# Patient Record
Sex: FEMALE | Race: WHITE | Employment: OTHER | URBAN - METROPOLITAN AREA
[De-identification: names, ages, dates, MRNs, and addresses within clinical notes are randomized per-mention and may not be internally consistent; named-entity substitution may affect disease eponyms.]

---

## 2017-01-24 ENCOUNTER — HOSPITAL ENCOUNTER (OUTPATIENT)
Dept: MAMMOGRAPHY | Facility: CLINIC | Age: 62
Discharge: HOME | End: 2017-01-24
Attending: FAMILY MEDICINE | Admitting: FAMILY MEDICINE
Payer: COMMERCIAL

## 2017-01-24 ENCOUNTER — HOSPITAL ENCOUNTER (OUTPATIENT)
Dept: ULTRASOUND IMAGING | Facility: CLINIC | Age: 62
End: 2017-01-24
Attending: FAMILY MEDICINE
Payer: COMMERCIAL

## 2017-01-24 DIAGNOSIS — N63.10 BREAST MASS, RIGHT: ICD-10-CM

## 2017-01-24 PROCEDURE — 76642 ULTRASOUND BREAST LIMITED: CPT | Mod: RT

## 2017-01-24 PROCEDURE — G0204 DX MAMMO INCL CAD BI: HCPCS

## 2017-01-25 ENCOUNTER — HOSPITAL ENCOUNTER (OUTPATIENT)
Dept: ULTRASOUND IMAGING | Facility: CLINIC | Age: 62
End: 2017-01-25
Attending: FAMILY MEDICINE
Payer: COMMERCIAL

## 2017-01-25 ENCOUNTER — HOSPITAL ENCOUNTER (OUTPATIENT)
Dept: ULTRASOUND IMAGING | Facility: CLINIC | Age: 62
Discharge: HOME | End: 2017-01-25
Attending: FAMILY MEDICINE | Admitting: FAMILY MEDICINE
Payer: COMMERCIAL

## 2017-01-25 DIAGNOSIS — N63.10 BREAST MASS, RIGHT: ICD-10-CM

## 2017-01-25 PROCEDURE — 88360 TUMOR IMMUNOHISTOCHEM/MANUAL: CPT | Mod: 26 | Performed by: FAMILY MEDICINE

## 2017-01-25 PROCEDURE — 88360 TUMOR IMMUNOHISTOCHEM/MANUAL: CPT | Performed by: FAMILY MEDICINE

## 2017-01-25 PROCEDURE — 88377 M/PHMTRC ALYS ISHQUANT/SEMIQ: CPT | Performed by: FAMILY MEDICINE

## 2017-01-25 PROCEDURE — 88305 TISSUE EXAM BY PATHOLOGIST: CPT | Performed by: FAMILY MEDICINE

## 2017-01-25 PROCEDURE — 00000159 ZZHCL STATISTIC H-SEND OUTS PREP: Performed by: FAMILY MEDICINE

## 2017-01-25 PROCEDURE — 27210206 US BREAST BIOPSY CORE NEEDLE RIGHT

## 2017-01-25 PROCEDURE — 88305 TISSUE EXAM BY PATHOLOGIST: CPT | Mod: 26 | Performed by: FAMILY MEDICINE

## 2017-01-25 PROCEDURE — 88360 TUMOR IMMUNOHISTOCHEM/MANUAL: CPT | Mod: 26,59 | Performed by: FAMILY MEDICINE

## 2017-01-25 PROCEDURE — 00000158 ZZHCL STATISTIC H-FISH PROCESS B/S: Performed by: FAMILY MEDICINE

## 2017-01-25 PROCEDURE — 38505 NEEDLE BIOPSY LYMPH NODES: CPT

## 2017-01-25 NOTE — DISCHARGE INSTRUCTIONS
Page 1 of 1  For informational purposes only. Not to replace the advice of your health care provider. Copyright   2010 [facility]. All rights reserved. [employer] [id number] - REV 02/16.  After Your Breast Biopsy   Bleeding or bruising  Slight bruising is normal. If you bleed through the bandage, put direct pressure on the breast for 10 minutes.   If the breast begins to swell, or you have a lot of bleeding after 10 minutes of pressure, call the doctor who ordered your exam. Or, go to the emergency room.   Bandages  Keep your bandage in place until tomorrow morning. Do not get it wet.   If you have small pieces of tape on the skin, leave them in place. They will fall off on their own, or you can remove them after 5 days.   Activity  You may shower the morning after the exam. No heavy activity (lifting, vacuuming) on the day of your exam. You may go back to normal activity the next day, unless you had a lot of bleeding or pain.  Discomfort  You may take Tylenol (acetaminophen) today for pain. Tomorrow, you may take an anti-inflammatory medicine (aspirin, ibuprofen, Motrin, Aleve, Advil), unless your doctor tells you not to.  Wear your bra overnight to support the breast. You may also use an ice pack: Place it over the area for 15-20 minutes several times a day.  Infection  Infection is rare. Symptoms include fever, redness, increasing pain and fluid draining from the biopsy site. If you have any of these symptoms, please call the doctor who ordered your exam.  Results  Results may take up to 5 business days. A nurse or doctor from the Breast Center will call with your results. We will also send the results to the doctor who ordered your biopsy.  If you have not heard your results in 5 days, please call the Breast Center.   Other instructions  ______________________________________________________________________________________________________________________________  Call your doctor if:    You have  bleeding that lasts more than 10 minutes.    You have pain that cannot be controlled.   You have signs of infection (fever, redness, drainage or other signs).   You have not received your results within 5 days.    Please call the Breast Center nurse navigator at [phone] if you have questions or concerns about your biopsy.

## 2017-01-25 NOTE — PROGRESS NOTES
Aromatherapy offered per department guidelines. Lavendar scent used to promote relaxation and stress reduction.  Delivery method:  Cotton ball. Patient reports relief of anxiety.

## 2017-01-27 ENCOUNTER — TELEPHONE (OUTPATIENT)
Dept: MAMMOGRAPHY | Facility: CLINIC | Age: 62
End: 2017-01-27

## 2017-01-27 LAB — COPATH REPORT: NORMAL

## 2017-01-27 NOTE — TELEPHONE ENCOUNTER
[facility] path  Pathology report reviewed with breast radiologist Diego  I phoned and informed patient of results showing IDC and node positive for breast cancer.    Patient states no problems with biopsy site.  Recommended follow up is surgical consult.  Surgical Consult has been arranged with Dr Castellano on 1-30-17 at 1030 arriving at 1000.   Patient has directions and phone numbers. Dr Lindo's office also notified of results.    Questions were answered and I explained my role as Nurse Navigator in assisting her with appointments, resources and social support. New diagnosis information packet will be available at surgical consult. I will follow up with the patient. She has my phone number if she has further questions. Ordering provider notified.

## 2017-01-30 ENCOUNTER — OFFICE VISIT (OUTPATIENT)
Dept: SURGERY | Facility: CLINIC | Age: 62
End: 2017-01-30
Payer: COMMERCIAL

## 2017-01-30 ENCOUNTER — TRANSFERRED RECORDS (OUTPATIENT)
Dept: HEALTH INFORMATION MANAGEMENT | Facility: CLINIC | Age: 62
End: 2017-01-30

## 2017-01-30 ENCOUNTER — TRANSFERRED RECORDS (OUTPATIENT)
Dept: SURGERY | Facility: CLINIC | Age: 62
End: 2017-01-30

## 2017-01-30 VITALS
HEART RATE: 100 BPM | BODY MASS INDEX: 22.07 KG/M2 | SYSTOLIC BLOOD PRESSURE: 144 MMHG | OXYGEN SATURATION: 98 % | HEIGHT: 69 IN | WEIGHT: 149 LBS | DIASTOLIC BLOOD PRESSURE: 98 MMHG

## 2017-01-30 DIAGNOSIS — C50.411 MALIGNANT NEOPLASM OF UPPER-OUTER QUADRANT OF RIGHT FEMALE BREAST (H): Primary | ICD-10-CM

## 2017-01-30 PROCEDURE — 99204 OFFICE O/P NEW MOD 45 MIN: CPT | Performed by: SURGERY

## 2017-01-30 RX ORDER — CALCIUM CARBONATE 500(1250)
500 TABLET ORAL 2 TIMES DAILY
COMMUNITY
End: 2017-02-20

## 2017-01-30 NOTE — PROGRESS NOTES
Breast Patients    BREAST PATIENTS (ALL)    1-Do you have any of the following symptoms? Lump(s) or Mass(es)  2-In which breast are you having the symptoms? right  3-Do you use hormones?  No  4-Have you had a Mammogram? Yes  Where: [facility]  Date: 1/24/17  5-Have you ever had a breast cyst drained? No  6-Have you ever had a breast biopsy? Yes  Side: R  Date: 1/24/17  7-Have you ever had a Breast Cancer? Yes  Side: R  Date: 3/2006   8-Is there a history of Breast Cancer in your family? Yes   Relationship to you:    Mother  Sister  9-Have you ever had Ovarian Cancer? No  10-Is there a history of Ovarian Cancer in your family? Yes   Relationship to you:    Mother  11-Summarize your caffeine intake (i.e. coffee, tea, chocolate, soda etc.): 1-2 cups daily    BREAST PATIENTS (FEMALE)    12-What age did your periods begin? 15  13-Date your last menstrual period began? 2006  14-Number of full-term pregnancies: 3  15-Your age when your first child was born? 26  16-Did you nurse your children? No  17-Are you pregnant now? No  18-Have you begun menopause? Yes  Age Menopause began:  51  19-Have you had either ovary removed?No  20-Do you have breast implants? No       HPI:  right breast mass noted 1 week ago; 12 o'clock.    denies increase in size.   Skin rashes, dimpling or nipple changes:  none  Nipple discharge:   none  Does perform self breast exams.  Previous other breast surgery: Yes - right breast lumpectomy and SN, 2006 (at 12:30- 9cm FN), SN negative and all margins negative    No family history on file.  Family history of breast cancer: Yes - mother and sister, ages 70 and 50    Mammography reveals: new indeterminate cluster of microcalcifications measuring at the 12:30 o'clock position, corresponding to the palpable abnormality    US reveals: heterogeneous hypoechoic mass measuring 2 cm at the 11:30 o'clock position and 2 cm from the nipple. Lobulated  node in right axilla with some calcifications    Percutaneous core needle biopsy reveals: invasive ductal carcinoma grade 3, ER low pos, DC negative, HER-2 - pending, Biopsy of axillary node also positive for metastatic carcinoma (HER2 ordered but pending)  Previously ER and DC POS, HER2 neg in 2006    Past Medical History:   has no past medical history on file.    Past Surgical History:  No past surgical history on file.     Social History:  Social History     Social History     Marital Status:      Spouse Name: N/A     Number of Children: N/A     Years of Education: N/A     Occupational History     Not on file.     Social History Main Topics     Smoking status: Not on file     Smokeless tobacco: Not on file     Alcohol Use: Not on file     Drug Use: Not on file     Sexual Activity: Not on file     Other Topics Concern     Not on file     Social History Narrative     No narrative on file       PE:  Vitals: There were no vitals taken for this visit.  General appearance: well-nourished, sitting comfortably, no apparent distress  Neck: Supple without thyromegaly, lymphadenopathy, masses  Lungs: Respirations unlabored  Abdomen: soft, nondistended  Extremities: Without edema  Neurologic: nonfocal, grossly intact times four extremities, alert and oriented times three  Psychiatric: Mood and affect are appropriate  Skin: Without lesions or rashes  Breast:     Masses- right - 3 cm diameter   Ecchymosis- right   Incisional scar- right 12:00, 9 cm from nipple    Axillae:   Palpable adenopathy: right-Induration, no clearly palpable node    Assessment: Right breast cancer, recurrent, metastatic to R axillary node.     PLAN:Await HER-2 results.  This will to some extent to dictate chemotherapy options. Since she is already known to have a positive right axillary node, chemotherapy may be her initial treatment. She has an appointment with her oncologist this afternoon.  I discussed the option of mastectomy with her and her   including incision, scar, reconstructive options (gave her contact information for plastic surgery) and expected recovery.  She inquires about contralateral mastectomy as well as she has had a previous breast cancer herself and both her mother and sister have had breast cancers.  We discussed additional genetic testing through her oncologist office that may help in assessing her risk.  We discussed axillary nodes.  With a positive axillary node at this point, surgery was done immediately, we will perform maxillary node dissection.  I therefore reviewed complications of this including numbness, seroma, hematoma, persistent drainage, lymphedema.  If she would undergo chemotherapy first, we could potentially resect the clip marked node and also perform a sentinel node procedure.  If this is negative, we could potentially leave the remaining nodes behind.      Nino Castellano MD    Please route or send letter to:  Primary Care Provider (PCP)(Dr Soto), Referring Provider (Dr Lindo) and Include Progress Note        HPI      ROS (Review of Systems):     MUSCULOSKELETAL: Positive for back pain.          Physical Exam

## 2017-01-30 NOTE — MR AVS SNAPSHOT
"              After Visit Summary   2017    Lorna Guzman    MRN: [id number]           Patient Information     Date Of Birth          1955        Visit Information        Provider Department      2017 10:30 AM Nino Castellano MD [facility] General Surgery      Today's Diagnoses     Malignant neoplasm of upper-outer quadrant of right female breast (H)    -  1        Follow-ups after your visit        Who to contact     If you have questions or need follow up information about today's clinic visit or your schedule please contact [facility] directly at [phone].  Normal or non-critical lab and imaging results will be communicated to you by [employer], letter or phone within 4 business days after the clinic has received the results. If you do not hear from us within 7 days, please contact the clinic through [employer] or phone. If you have a critical or abnormal lab result, we will notify you by phone as soon as possible.  Submit refill requests through [employer] or call your pharmacy and they will forward the refill request to us. Please allow 3 business days for your refill to be completed.          Additional Information About Your Visit        MyChart Information     [employer] lets you send messages to your doctor, view your test results, renew your prescriptions, schedule appointments and more. To sign up, go to [email or web address] . Click on \"Log in\" on the left side of the screen, which will take you to the Welcome page. Then click on \"Sign up Now\" on the right side of the page.     You will be asked to enter the access code listed below, as well as some personal information. Please follow the directions to create your username and password.     Your access code is: [email or web address]  Expires: 2017 11:23 AM     Your access code will  in 90 days. If you need help or a new code, please call your [facility] or " "[phone].        Care EveryWhere ID     This is your Care EveryWhere ID. This could be used by other organizations to access your [facility] medical records  [id number]        Your Vitals Were     Pulse Height BMI (Body Mass Index) Pulse Oximetry          100 5' 9\" (1.753 m) 21.99 kg/m2 98%         Blood Pressure from Last 3 Encounters:   01/30/17 144/98    Weight from Last 3 Encounters:   01/30/17 149 lb (67.586 kg)              Today, you had the following     No orders found for display       Primary Care Provider Office Phone # Fax #    Max Moreno [phone]       [address] [address]        Thank you!     Thank you for choosing [facility]  for your care. Our goal is always to provide you with excellent care. Hearing back from our patients is one way we can continue to improve our services. Please take a few minutes to complete the written survey that you may receive in the mail after your visit with us. Thank you!             Your Updated Medication List - Protect others around you: Learn how to safely use, store and throw away your medicines at www.disposemymeds.org.          This list is accurate as of: 1/30/17 11:23 AM.  Always use your most recent med list.                   Brand Name Dispense Instructions for use    calcium carbonate 500 MG tablet    OS-CAROLYN 500 mg [place]. [place]     Take 500 mg by mouth 2 times daily       MULTIPLE VITAMINS PO            "

## 2017-01-30 NOTE — Clinical Note
2017      RE:  Lorna Guzman-:  [birth year]    HPI:  right breast mass noted 1 week ago; 12 o'clock.    denies increase in size.   Skin rashes, dimpling or nipple changes:  none  Nipple discharge:   none  Does perform self breast exams.  Previous other breast surgery: Yes - right breast lumpectomy and SN, 2006 (at 12:30- 9cm FN), SN negative and all margins negative    No family history on file.  Family history of breast cancer: Yes - mother and sister, ages 70 and 50    Mammography reveals: new indeterminate cluster of microcalcifications measuring at the 12:30 o'clock position, corresponding to the palpable abnormality    US reveals: heterogeneous hypoechoic mass measuring 2 cm at the 11:30 o'clock position and 2 cm from the nipple. Lobulated node in right axilla with some calcifications    Percutaneous core needle biopsy reveals: invasive ductal carcinoma grade 3, ER low pos, CO negative, HER-2 - pending, Biopsy of axillary node also positive for metastatic carcinoma (HER2 ordered but pending)  Previously ER and CO POS, HER2 neg in     Past Medical History:  Has no past medical history on file.    Past Surgical History:  No past surgical history on file.     PE:  Vitals: There were no vitals taken for this visit.  General appearance: well-nourished, sitting comfortably, no apparent distress  Neck: Supple without thyromegaly, lymphadenopathy, masses  Lungs: Respirations unlabored  Abdomen: soft, nondistended  Extremities: Without edema  Neurologic: nonfocal, grossly intact times four extremities, alert and oriented times three  Psychiatric: Mood and affect are appropriate  Skin: Without lesions or rashes  Breast:     Masses- right - 3 cm diameter   Ecchymosis- right   Incisional scar- right 12:00, 9 cm from nipple    Axillae:   Palpable adenopathy: right-Induration, no clearly palpable node    Assessment: Right breast cancer, recurrent, metastatic to R axillary node.     PLAN:Await HER-2  results.  This will to some extent to dictate chemotherapy options. Since she is already known to have a positive right axillary node, chemotherapy may be her initial treatment. She has an appointment with her oncologist this afternoon.  I discussed the option of mastectomy with her and her  including incision, scar, reconstructive options (gave her contact information for plastic surgery) and expected recovery.  She inquires about contralateral mastectomy as well as she has had a previous breast cancer herself and both her mother and sister have had breast cancers.  We discussed additional genetic testing through her oncologist office that may help in assessing her risk.  We discussed axillary nodes.  With a positive axillary node at this point, surgery was done immediately, we will perform maxillary node dissection.  I therefore reviewed complications of this including numbness, seroma, hematoma, persistent drainage, lymphedema.  If she would undergo chemotherapy first, we could potentially resect the clip marked node and also perform a sentinel node procedure.  If this is negative, we could potentially leave the remaining nodes behind.      Nino Castellano MD

## 2017-01-31 DIAGNOSIS — C50.919 BREAST CANCER (H): Primary | ICD-10-CM

## 2017-01-31 DIAGNOSIS — Z01.818 PREOP EXAMINATION: ICD-10-CM

## 2017-02-01 ENCOUNTER — HOSPITAL ENCOUNTER (OUTPATIENT)
Dept: CARDIOLOGY | Facility: CLINIC | Age: 62
Discharge: HOME | End: 2017-02-01
Payer: COMMERCIAL

## 2017-02-01 DIAGNOSIS — C50.911 MALIGNANT NEOPLASM OF RIGHT FEMALE BREAST, UNSPECIFIED SITE OF BREAST: ICD-10-CM

## 2017-02-01 DIAGNOSIS — Z01.818 PREOP EXAMINATION: ICD-10-CM

## 2017-02-01 PROCEDURE — 93306 TTE W/DOPPLER COMPLETE: CPT | Mod: 26 | Performed by: INTERNAL MEDICINE

## 2017-02-01 PROCEDURE — 0399T ZZHC MYOCARDIAL STRAIN IMAGING: CPT | Performed by: INTERNAL MEDICINE

## 2017-02-01 PROCEDURE — 93306 TTE W/DOPPLER COMPLETE: CPT

## 2017-02-02 ENCOUNTER — TELEPHONE (OUTPATIENT)
Dept: MAMMOGRAPHY | Facility: CLINIC | Age: 62
End: 2017-02-02

## 2017-02-03 LAB — COPATH REPORT: NORMAL

## 2017-02-06 ENCOUNTER — OFFICE VISIT (OUTPATIENT)
Dept: CARDIOLOGY | Facility: CLINIC | Age: 62
End: 2017-02-06
Payer: COMMERCIAL

## 2017-02-06 ENCOUNTER — DOCUMENTATION ONLY (OUTPATIENT)
Dept: CARDIOLOGY | Facility: CLINIC | Age: 62
End: 2017-02-06

## 2017-02-06 VITALS
HEIGHT: 70 IN | WEIGHT: 148 LBS | HEART RATE: 84 BPM | SYSTOLIC BLOOD PRESSURE: 124 MMHG | DIASTOLIC BLOOD PRESSURE: 68 MMHG | BODY MASS INDEX: 21.19 KG/M2

## 2017-02-06 DIAGNOSIS — I42.9 CARDIOMYOPATHY, IDIOPATHIC (H): ICD-10-CM

## 2017-02-06 DIAGNOSIS — C50.911 MALIGNANT NEOPLASM OF RIGHT FEMALE BREAST, UNSPECIFIED SITE OF BREAST: Primary | ICD-10-CM

## 2017-02-06 LAB
NT-PROBNP SERPL-MCNC: 136 PG/ML (ref 0–125)
TROPONIN I SERPL-MCNC: NORMAL UG/L (ref 0–0.04)

## 2017-02-06 PROCEDURE — 84484 ASSAY OF TROPONIN QUANT: CPT | Performed by: INTERNAL MEDICINE

## 2017-02-06 PROCEDURE — 36415 COLL VENOUS BLD VENIPUNCTURE: CPT | Performed by: INTERNAL MEDICINE

## 2017-02-06 PROCEDURE — 83880 ASSAY OF NATRIURETIC PEPTIDE: CPT | Performed by: INTERNAL MEDICINE

## 2017-02-06 PROCEDURE — 99204 OFFICE O/P NEW MOD 45 MIN: CPT | Performed by: INTERNAL MEDICINE

## 2017-02-06 PROCEDURE — 93000 ELECTROCARDIOGRAM COMPLETE: CPT | Performed by: INTERNAL MEDICINE

## 2017-02-06 RX ORDER — CARVEDILOL 3.12 MG/1
3.12 TABLET ORAL 2 TIMES DAILY WITH MEALS
Qty: 90 TABLET | Refills: 3 | Status: SHIPPED | OUTPATIENT
Start: 2017-02-06 | End: 2017-02-20

## 2017-02-06 NOTE — LETTER
2/6/2017    Max Moreno MD  Critical access hospital   [address] [facility] [address]    RE: Lorna A Thomas       Dear Colleague,    REASON FOR CONSULTATION:  Breast carcinoma, evidence of left ventricular dysfunction on echocardiogram.        I had the pleasure of seeing Ms. Guzman in consultation at the [facility] today.  She is a very pleasant 61-year-old female with no significant past cardiac history who was recently diagnosed with breast carcinoma.  She has a history of right breast carcinoma that was initially diagnosed in 03/2006.  At that time she underwent lumpectomy and AC chemotherapy as well as adjuvant radiation therapy.  She was also placed on adjuvant tamoxifen at that time due to her ER/UT positivity.  After 2 years of tamoxifen, she was transitioned to Aromasin and completed treatment in 07/2013.  At that point, there was no evidence of disease recurrence.      Unfortunately, in late 01/2017, she noticed a right breast lump and has subsequently been diagnosed with invasive ductal carcinoma of the right breast.  The plan at this point is for her to undergo neoadjuvant chemotherapy prior to definitive mastectomy and axillary lymph node dissection.      As part of her workup for on her upcoming chemotherapy, she underwent an echocardiogram that demonstrated mildly reduced left ventricular systolic function with a biplane LVEF of 47%.  Cardiac consultation was advised.      She is completely asymptomatic from a cardiac standpoint, denying any chest discomfort, dyspnea on exertion, PND, orthopnea or lower extremity edema.  She denies any syncope or presyncope.  She is a very active individual who works out on a regular basis and denies any chest pain or shortness of breath while doing so.  She does not take any cardiac medications.      Her past medical history, family history, social history, allergies and medications are reviewed in my Epic  note.      PHYSICAL EXAMINATION:   GENERAL:  She was alert and oriented.   VITAL SIGNS:  Her blood pressure today was 124/60 with a pulse of 84.   NECK:  Revealed no jugular venous distension or carotid bruits.   CHEST:  Clear to auscultation.   CARDIOVASCULAR:  Revealed a regular rate and rhythm, no murmurs are appreciated.   ABDOMEN:  Soft, nontender.   EXTREMITIES:  Demonstrated no edema.      She underwent an EKG today that demonstrated normal sinus rhythm at a rate of 87 beats per minute with normal axis and intervals.      I personally reviewed her echocardiogram obtained on 02/01/2017.  I agree with the reader's interpretation that she has mild global left ventricular dysfunction.  Mild mitral regurgitation was also noted.  By way of comparison, stress echocardiogram from 06/2011 demonstrated normal resting left ventricular systolic function.  The stress test was negative for ischemia.     Outpatient Encounter Prescriptions as of 2/6/2017   Medication Sig Dispense Refill     carvedilol (COREG) 3.125 MG tablet Take 1 tablet (3.125 mg) by mouth 2 times daily (with meals) 90 tablet 3     MULTIPLE VITAMINS PO        calcium carbonate (OS-CAROLYN 500 MG [place]. [place]) 500 MG tablet Take 500 mg by mouth 2 times daily       No facility-administered encounter medications on file as of 2/6/2017.       IMPRESSION:   1.  Recurrent right breast carcinoma.   2.  Mild left ventricular dysfunction on echocardiography which may be secondary to previous chemotherapy.  The patient is completely asymptomatic from a cardiac standpoint.      Ms. Guzman presents for an evaluation regarding a recently diagnosed recurrent right breast carcinoma as well as mild left ventricular dysfunction on echocardiography.  The plan at this point is for her to undergo neoadjuvant chemotherapy with a taxane and cyclophosphamide as well as possibly Herceptin if she is HER-2 positive.      From an etiology standpoint, the most likely culprit in this case  is her prior history of Adriamycin therapy.  Having said that, I do think she needs to be evaluated any further less likely causes of left ventricular dysfunction such as significant obstructive coronary artery disease or conditions such as a prior myocarditis.  To this effect, I have ordered a stress cardiac MRI for further evaluation.  This will also help us establish a more precise baseline LVEF estimate.      From a therapeutic standpoint, I have recommended that we initiate carvedilol 3.125 mg b.i.d.  I have explained the rationale for using this medication as well as the potential side effects.  Her blood pressure and heart rate appear to be adequate for initiation of beta blocker therapy and hopefully will able to titrate this up further.      From a chemotherapy standpoint, at this point in time given the mild left ventricular dysfunction and absence of any associated symptoms, I think it is permissible for her to undergo chemotherapy.  I do think that her left ventricular systolic function will need to be reassessed in approximately 1 month to rule out any further deterioration.      It was a pleasure seeing her in consultation today.  I did ask for baseline troponin and BNP values to be drawn today.  I will have her follow up in about 2 weeks of my physician assistants to go over the results of the stress MRI as well as to assess her response to the carvedilol.  If at that point she is tolerating it well, the initiation of lisinopril could be considered.  I have also asked her to contact us if she experiences any dyspnea on exertion, chest discomfort, palpitations, syncope or presyncope in which case our management strategy may change.     Sincerely,    Elias Spencer MD     [facility]

## 2017-02-06 NOTE — PROGRESS NOTES
REASON FOR CONSULTATION:  Breast carcinoma, evidence of left ventricular dysfunction on echocardiogram.        HISTORY OF PRESENT ILLNESS:  I had the pleasure of seeing Ms. Guzman in consultation at the [facility] today.  She is a very pleasant 61-year-old female with no significant past cardiac history who was recently diagnosed with breast carcinoma.  She has a history of right breast carcinoma that was initially diagnosed in 03/2006.  At that time she underwent lumpectomy and AC chemotherapy as well as adjuvant radiation therapy.  She was also placed on adjuvant tamoxifen at that time due to her ER/LA positivity.  After 2 years of tamoxifen, she was transitioned to Aromasin and completed treatment in 07/2013.  At that point, there was no evidence of disease recurrence.      Unfortunately, in late 01/2017, she noticed a right breast lump and has subsequently been diagnosed with invasive ductal carcinoma of the right breast.  The plan at this point is for her to undergo neoadjuvant chemotherapy prior to definitive mastectomy and axillary lymph node dissection.      As part of her workup for on her upcoming chemotherapy, she underwent an echocardiogram that demonstrated mildly reduced left ventricular systolic function with a biplane LVEF of 47%.  Cardiac consultation was advised.      She is completely asymptomatic from a cardiac standpoint, denying any chest discomfort, dyspnea on exertion, PND, orthopnea or lower extremity edema.  She denies any syncope or presyncope.  She is a very active individual who works out on a regular basis and denies any chest pain or shortness of breath while doing so.  She does not take any cardiac medications.      Her past medical history, family history, social history, allergies and medications are reviewed in my Epic note.      PHYSICAL EXAMINATION:   GENERAL:  She was alert and oriented.   VITAL SIGNS:  Her blood pressure today was 124/60 with a  pulse of 84.   NECK:  Revealed no jugular venous distension or carotid bruits.   CHEST:  Clear to auscultation.   CARDIOVASCULAR:  Revealed a regular rate and rhythm, no murmurs are appreciated.   ABDOMEN:  Soft, nontender.   EXTREMITIES:  Demonstrated no edema.      She underwent an EKG today that demonstrated normal sinus rhythm at a rate of 87 beats per minute with normal axis and intervals.      I personally reviewed her echocardiogram obtained on 02/01/2017.  I agree with the reader's interpretation that she has mild global left ventricular dysfunction.  Mild mitral regurgitation was also noted.  By way of comparison, stress echocardiogram from 06/2011 demonstrated normal resting left ventricular systolic function.  The stress test was negative for ischemia.      IMPRESSION:   1.  Recurrent right breast carcinoma.   2.  Mild left ventricular dysfunction on echocardiography which may be secondary to previous chemotherapy.  The patient is completely asymptomatic from a cardiac standpoint.      Ms. Guzman presents for an evaluation regarding a recently diagnosed recurrent right breast carcinoma as well as mild left ventricular dysfunction on echocardiography.  The plan at this point is for her to undergo neoadjuvant chemotherapy with a taxane and cyclophosphamide as well as possibly Herceptin if she is HER-2 positive.      From an etiology standpoint, the most likely culprit in this case is her prior history of Adriamycin therapy.  Having said that, I do think she needs to be evaluated any further less likely causes of left ventricular dysfunction such as significant obstructive coronary artery disease or conditions such as a prior myocarditis.  To this effect, I have ordered a stress cardiac MRI for further evaluation.  This will also help us establish a more precise baseline LVEF estimate.      From a therapeutic standpoint, I have recommended that we initiate carvedilol 3.125 mg b.i.d.  I have explained the  rationale for using this medication as well as the potential side effects.  Her blood pressure and heart rate appear to be adequate for initiation of beta blocker therapy and hopefully will able to titrate this up further.      From a chemotherapy standpoint, at this point in time given the mild left ventricular dysfunction and absence of any associated symptoms, I think it is permissible for her to undergo chemotherapy.  I do think that her left ventricular systolic function will need to be reassessed in approximately 1 month to rule out any further deterioration.      It was a pleasure seeing her in consultation today.  I did ask for baseline troponin and BNP values to be drawn today.  I will have her follow up in about 2 weeks of my physician assistants to go over the results of the stress MRI as well as to assess her response to the carvedilol.  If at that point she is tolerating it well, the initiation of lisinopril could be considered.  I have also asked her to contact us if she experiences any dyspnea on exertion, chest discomfort, palpitations, syncope or presyncope in which case our management strategy may change.         MANNY BAIN MD             D: 2017 09:43   T: 2017 14:13   MT: [id number]      Name:     KIMMIE PRICE   MRN:      [id number]        Account:      [id number]   :      1955           Service Date: 2017      Document: [id number]

## 2017-02-06 NOTE — MR AVS SNAPSHOT
After Visit Summary   2/6/2017    Lorna Guzman    MRN: [id number]           Patient Information     Date Of Birth          1955        Visit Information        Provider Department      2/6/2017 8:30 AM Elias Spencer MD [facility] [place]        Today's Diagnoses     Breast cancer (H)    -  1     Cardiomyopathy, idiopathic (H)            Follow-ups after your visit        Additional Services     Follow-Up with Cardiac Advanced Practice Provider                 Your next 10 appointments already scheduled     Feb 07, 2017  1:00 PM   CT LIVER BIOPSY with SHCT1, SH BODY RAD   [facility] ([facility])    [address] [address] [phone]           Plan for an adult to drive you home and stay with you until morning.  Tell your doctor in advance:   If you have any allergies.   If you are breastfeeding or there s any chance you are pregnant.  Please bring any scans or X-rays taken at other hospitals, if they may be helpful. Also bring a list of your medicines, including vitamins, minerals and over-the-counter drugs. It is safest to leave valuables at home.  If you take blood thinners, you may need to stop taking them a few days before treatment. Talk to your doctor before stopping these medicines. You will need a blood test the morning of your exam.   Stop taking Coumadin (warfarin) 5 days before treatment. Restart the day after treatment.   If you take aspirin, you may need to stop taking it 3 days before treatment.   If you take Plavix, Ticlid, Pletal or Persantine, you may need to stop taking them 5 days before your scan.  If you have diabetes:   If you take insulin, call your diabetes care team. Ask if you should adjust your insulin before this test.   If your kidney function is normal, continue taking your metformin (Avandamet, Glucophage, Glucovance, Metaglip) on the day of your exam.   If your kidney  function is abnormal, wait 48 hours before restarting this medicine.  If you have any questions, please call the imaging department where you will have your exam.  The day before your exam: Drink extra fluids at least six 8-ounce glasses (unless your doctor tells you to restrict fluids). The day of your exam: No eating or drinking for 4 hours before your test. You may take medicine with small sips of water.            Feb 10, 2017 10:00 AM   MR CARDIAC W CONTRAST AND STRESS with SCIMR1   [facility] ([facility])    [address]  [address]  [facility] [address] [phone]           Take your medicines as usual, unless your doctor tells you not to.   If you take Viagra, Levitra or Cialis, stop taking it 48 hours before your test.   If you take Aggrenox or dipyridamole (Persantine, Permole), stop taking it 48 hours before your test.   If you take Theophylline or Aminophylline, stop taking it 12 hours before your test.   If you are diabetic and take oral hypoglycemics, do not take them on the day of your test.  The day before your exam, drink extra fluids at least six 8-ounce glasses (unless your doctor wants you to limit your fluids).  Stop all caffeine 12 hours before the test. This includes coffee, tea, soda, chocolate and certain medicines (such as Anacin, Excedrin and NoDoz). Also avoid decaf coffee and tea, as these contain small amounts of caffeine.  Do not eat or drink for 3 hours before your exam. You may drink water and take your morning medicines.  You may need a blood test (creatinine test) within 30 days of your exam. Follow your doctor s orders if this is arranged before your exam.  If you are very claustrophobic, please let you doctor know. You may get a sedative medicine from your doctor before you arrive. Bring the medicine to the exam. Do not take it at home. Arrive one hour early. Bring someone who can take you home after the  test. Your medicine will make you sleepy. After the exam, you may not drive, take a bus or take a taxi by yourself.  The MRI machine uses a strong magnet. Please wear clothes without metal (snaps, zippers). A sweatsuit works well, or we may give you a hospital gown.  Please remove any body piercings and hair extensions before you arrive. You will remove watches, jewelry, hairpins, wallets, dentures, partial dental plates and hearing aids. You may wear contact lenses, and you may be able to wear your rings. We have a safe place to keep your personal items, but it is safer to leave them at home.   **IMPORTANT** THE INSTRUCTIONS BELOW ARE ONLY FOR THOSE PATIENTS WHO HAVE BEEN TOLD THEY WILL RECEIVE SEDATION OR GENERAL ANESTHESIA DURING THEIR MRI PROCEDURE:  IF YOU WILL RECEIVE SEDATION (take medicine to help you relax during your exam):   You must get the medicine from your doctor before you arrive. Bring the medicine to the exam. Do not take it at home.   Arrive one hour early. Bring someone who can take you home after the test. Your medicine will make you sleepy. After the exam, you may not drive, take a bus or take a taxi by yourself.   No eating 8 hours before your exam. You may have clear liquids up until 4 hours before your exam. (Clear liquids include water, clear tea, black coffee and fruit juice without pulp.)  IF YOU WILL RECEIVE ANESTHESIA (be asleep for your exam):   Arrive 1 1/2 hours early. Bring someone who can take you home after the test. You may not drive, take a bus or take a taxi by yourself.   No eating 8 hours before your exam. You may have clear liquids up until 4 hours before your exam. (Clear liquids include water, clear tea, black coffee and fruit juice without pulp.)  If you have any questions, please contact your Imaging Department exam site.            Feb 20, 2017  1:50 PM   Return Visit with [facility] ([facility]  "Clinics)    [address] [place] [address] [address]  [facility] [address] [phone]              Future tests that were ordered for you today     Open Future Orders        Priority Expected Expires Ordered    Follow-Up with Cardiac Advanced Practice Provider Routine 2/20/2017 2/6/2018 2/6/2017    MRI Cardiac w/contrast and stress Routine 2/7/2017 2/6/2018 2/6/2017            Who to contact     If you have questions or need follow up information about today's clinic visit or your schedule please contact [facility] [place] directly at [phone].  Normal or non-critical lab and imaging results will be communicated to you by MyChart, letter or phone within 4 business days after the clinic has received the results. If you do not hear from us within 7 days, please contact the clinic through MyChart or phone. If you have a critical or abnormal lab result, we will notify you by phone as soon as possible.  Submit refill requests through [employer] or call your pharmacy and they will forward the refill request to us. Please allow 3 business days for your refill to be completed.          Additional Information About Your Visit        Care EveryWhere ID     This is your Care EveryWhere ID. This could be used by other organizations to access your [place] medical records  [id number]        Your Vitals Were     Pulse Height BMI (Body Mass Index)             84 1.765 m (5' 9.5\") 21.55 kg/m2          Blood Pressure from Last 3 Encounters:   02/06/17 124/68   01/30/17 144/98    Weight from Last 3 Encounters:   02/06/17 67.132 kg (148 lb)   01/30/17 67.586 kg (149 lb)              We Performed the Following     EKG 12-lead complete w/read - Clinics (performed today)          Today's Medication Changes          These changes are accurate as of: 2/6/17 10:00 AM.  If you have any questions, ask your nurse or doctor.               Start taking these medicines.        Dose/Directions    carvedilol 3.125 " MG tablet   Commonly known as:  COREG   Used for:  Cardiomyopathy, idiopathic (H)   Started by:  Elias Spencer MD        Dose:  3.125 mg   Take 1 tablet (3.125 mg) by mouth 2 times daily (with meals)   Quantity:  90 tablet   Refills:  3            Where to get your medicines      These medications were sent to [facility] - [place], [place] - [address], [facility] [address]     Phone:  [phone]    - carvedilol 3.125 MG tablet             Primary Care Provider Office Phone # Fax #    Max Moreno [phone]       [address] [address]        Thank you!     Thank you for choosing [facility] [place]  for your care. Our goal is always to provide you with excellent care. Hearing back from our patients is one way we can continue to improve our services. Please take a few minutes to complete the written survey that you may receive in the mail after your visit with us. Thank you!             Your Updated Medication List - Protect others around you: Learn how to safely use, store and throw away your medicines at www.disposemymeds.org.          This list is accurate as of: 2/6/17 10:00 AM.  Always use your most recent med list.                   Brand Name Dispense Instructions for use    calcium carbonate 500 MG tablet    OS-CAROLYN 500 mg [place]. [place]     Take 500 mg by mouth 2 times daily       carvedilol 3.125 MG tablet    COREG    90 tablet    Take 1 tablet (3.125 mg) by mouth 2 times daily (with meals)       MULTIPLE VITAMINS PO

## 2017-02-06 NOTE — PROGRESS NOTES
HPI and Plan:   See dictation    Orders Placed This Encounter   Procedures     MRI Cardiac w/contrast and stress     N terminal pro BNP outpatient     Troponin I     Follow-Up with Cardiac Advanced Practice Provider     EKG 12-lead complete w/read - Clinics (performed today)       Orders Placed This Encounter   Medications     carvedilol (COREG) 3.125 MG tablet     Sig: Take 1 tablet (3.125 mg) by mouth 2 times daily (with meals)     Dispense:  90 tablet     Refill:  3       There are no discontinued medications.      Encounter Diagnoses   Name Primary?     Breast cancer (H) Yes     Cardiomyopathy, idiopathic (H)        CURRENT MEDICATIONS:  Current Outpatient Prescriptions   Medication Sig Dispense Refill     carvedilol (COREG) 3.125 MG tablet Take 1 tablet (3.125 mg) by mouth 2 times daily (with meals) 90 tablet 3     MULTIPLE VITAMINS PO        calcium carbonate (OS-CAROLYN 500 MG [place]. [place]) 500 MG tablet Take 500 mg by mouth 2 times daily         ALLERGIES   No Known Allergies    PAST MEDICAL HISTORY:  Past Medical History   Diagnosis Date     Cancer (H)      breast       PAST SURGICAL HISTORY:  Past Surgical History   Procedure Laterality Date     Breast surgery       March 2006       FAMILY HISTORY:  Family History   Problem Relation Age of Onset     DIABETES Mother      Breast Cancer Mother      Ovarian Cancer Mother      Hypertension Father      Breast Cancer Sister        SOCIAL HISTORY:  Social History     Social History     Marital Status:      Spouse Name: N/A     Number of Children: N/A     Years of Education: N/A     Social History Main Topics     Smoking status: Never Smoker      Smokeless tobacco: None     Alcohol Use: No     Drug Use: No     Sexual Activity:     Partners: Male     Other Topics Concern     None     Social History Narrative       Review of Systems:  Skin:  Negative       Eyes:  Negative      ENT:  Negative      Respiratory:  Negative       Cardiovascular:  Negative     "  Gastroenterology: Negative      Genitourinary:  Negative      Musculoskeletal:  Negative      Neurologic:  Negative      Psychiatric:  Positive for anxiety pos breat cancer  Heme/Lymph/Imm:  Negative      Endocrine:  Negative        Physical Exam:  Vitals: /68 mmHg  Pulse 84  Ht 1.765 m (5' 9.5\")  Wt 67.132 kg (148 lb)  BMI 21.55 kg/m2    Constitutional:  cooperative        Skin:  warm and dry to the touch        Head:  normocephalic        Eyes:  pupils equal and round        ENT:  no pallor or cyanosis        Neck:  carotid pulses are full and equal bilaterally        Chest:  clear to auscultation          Cardiac: regular rhythm                  Abdomen:  abdomen soft        Vascular: pulses full and equal                                        Extremities and Back:  no edema              Neurological:  no gross motor deficits              CC  No referring provider defined for this encounter.                "

## 2017-02-06 NOTE — PROGRESS NOTES
Post-OV BNP and troponin 2/6/17 noted. Patient has OV 2/20/17 with BALDOMERO Maira Khanna  Liver biopsy scheduled 2/7/17  MRI scheduled 2/10/17  Will message Dr. Spencer to review

## 2017-02-07 ENCOUNTER — HOSPITAL ENCOUNTER (OUTPATIENT)
Dept: CT IMAGING | Facility: CLINIC | Age: 62
End: 2017-02-07
Payer: COMMERCIAL

## 2017-02-07 ENCOUNTER — TELEPHONE (OUTPATIENT)
Dept: CARDIOLOGY | Facility: CLINIC | Age: 62
End: 2017-02-07

## 2017-02-07 ENCOUNTER — HOSPITAL ENCOUNTER (OUTPATIENT)
Facility: CLINIC | Age: 62
Discharge: HOME | End: 2017-02-07
Payer: COMMERCIAL

## 2017-02-07 VITALS
RESPIRATION RATE: 18 BRPM | SYSTOLIC BLOOD PRESSURE: 149 MMHG | OXYGEN SATURATION: 93 % | DIASTOLIC BLOOD PRESSURE: 79 MMHG | HEART RATE: 87 BPM

## 2017-02-07 VITALS
OXYGEN SATURATION: 97 % | HEART RATE: 77 BPM | DIASTOLIC BLOOD PRESSURE: 67 MMHG | TEMPERATURE: 97.1 F | SYSTOLIC BLOOD PRESSURE: 144 MMHG | RESPIRATION RATE: 18 BRPM

## 2017-02-07 DIAGNOSIS — K76.9 LIVER LESION: ICD-10-CM

## 2017-02-07 DIAGNOSIS — C50.919 MALIGNANT NEOPLASM OF FEMALE BREAST, UNSPECIFIED LATERALITY, UNSPECIFIED SITE OF BREAST: ICD-10-CM

## 2017-02-07 LAB
INR PPP: 0.9 (ref 0.86–1.14)
PLATELET # BLD AUTO: 214 10E9/L (ref 150–450)

## 2017-02-07 PROCEDURE — 88161 CYTOPATH SMEAR OTHER SOURCE: CPT | Mod: 26 | Performed by: RADIOLOGY

## 2017-02-07 PROCEDURE — 85610 PROTHROMBIN TIME: CPT

## 2017-02-07 PROCEDURE — 88360 TUMOR IMMUNOHISTOCHEM/MANUAL: CPT | Performed by: RADIOLOGY

## 2017-02-07 PROCEDURE — 88360 TUMOR IMMUNOHISTOCHEM/MANUAL: CPT | Mod: 26 | Performed by: RADIOLOGY

## 2017-02-07 PROCEDURE — 88342 IMHCHEM/IMCYTCHM 1ST ANTB: CPT | Mod: XU | Performed by: RADIOLOGY

## 2017-02-07 PROCEDURE — 40000863 ZZH STATISTIC RADIOLOGY XRAY, US, CT, MAR, NM

## 2017-02-07 PROCEDURE — 85049 AUTOMATED PLATELET COUNT: CPT

## 2017-02-07 PROCEDURE — 99153 MOD SED SAME PHYS/QHP EA: CPT

## 2017-02-07 PROCEDURE — 88161 CYTOPATH SMEAR OTHER SOURCE: CPT | Mod: XU | Performed by: RADIOLOGY

## 2017-02-07 PROCEDURE — 99152 MOD SED SAME PHYS/QHP 5/>YRS: CPT

## 2017-02-07 PROCEDURE — 88172 CYTP DX EVAL FNA 1ST EA SITE: CPT | Performed by: RADIOLOGY

## 2017-02-07 PROCEDURE — 88342 IMHCHEM/IMCYTCHM 1ST ANTB: CPT | Mod: 26 | Performed by: RADIOLOGY

## 2017-02-07 PROCEDURE — 77012 CT SCAN FOR NEEDLE BIOPSY: CPT

## 2017-02-07 PROCEDURE — 25000128 H RX IP 250 OP 636: Performed by: RADIOLOGY

## 2017-02-07 PROCEDURE — 88172 CYTP DX EVAL FNA 1ST EA SITE: CPT | Mod: 26 | Performed by: RADIOLOGY

## 2017-02-07 PROCEDURE — 88173 CYTOPATH EVAL FNA REPORT: CPT | Mod: 26 | Performed by: RADIOLOGY

## 2017-02-07 PROCEDURE — 25000125 ZZHC RX 250

## 2017-02-07 PROCEDURE — 88307 TISSUE EXAM BY PATHOLOGIST: CPT | Mod: 26 | Performed by: RADIOLOGY

## 2017-02-07 PROCEDURE — 25000125 ZZHC RX 250: Performed by: RADIOLOGY

## 2017-02-07 PROCEDURE — 25500064 ZZH RX 255 OP 636

## 2017-02-07 PROCEDURE — 88173 CYTOPATH EVAL FNA REPORT: CPT | Performed by: RADIOLOGY

## 2017-02-07 PROCEDURE — 88307 TISSUE EXAM BY PATHOLOGIST: CPT | Performed by: RADIOLOGY

## 2017-02-07 RX ORDER — LIDOCAINE HYDROCHLORIDE 10 MG/ML
10 INJECTION, SOLUTION EPIDURAL; INFILTRATION; INTRACAUDAL; PERINEURAL ONCE
Status: COMPLETED | OUTPATIENT
Start: 2017-02-07 | End: 2017-02-07

## 2017-02-07 RX ORDER — LIDOCAINE 40 MG/G
CREAM TOPICAL
Status: DISCONTINUED | OUTPATIENT
Start: 2017-02-07 | End: 2017-02-07 | Stop reason: HOSPADM

## 2017-02-07 RX ORDER — LIDOCAINE HYDROCHLORIDE 10 MG/ML
1-30 INJECTION, SOLUTION EPIDURAL; INFILTRATION; INTRACAUDAL; PERINEURAL
Status: DISCONTINUED | OUTPATIENT
Start: 2017-02-07 | End: 2017-02-08 | Stop reason: HOSPADM

## 2017-02-07 RX ORDER — FENTANYL CITRATE 50 UG/ML
25-50 INJECTION, SOLUTION INTRAMUSCULAR; INTRAVENOUS EVERY 5 MIN PRN
Status: DISCONTINUED | OUTPATIENT
Start: 2017-02-07 | End: 2017-02-07

## 2017-02-07 RX ORDER — IOPAMIDOL 755 MG/ML
72 INJECTION, SOLUTION INTRAVASCULAR ONCE
Status: COMPLETED | OUTPATIENT
Start: 2017-02-07 | End: 2017-02-07

## 2017-02-07 RX ORDER — NALOXONE HYDROCHLORIDE 0.4 MG/ML
.1-.4 INJECTION, SOLUTION INTRAMUSCULAR; INTRAVENOUS; SUBCUTANEOUS
Status: DISCONTINUED | OUTPATIENT
Start: 2017-02-07 | End: 2017-02-07

## 2017-02-07 RX ORDER — FLUMAZENIL 0.1 MG/ML
0.2 INJECTION, SOLUTION INTRAVENOUS
Status: DISCONTINUED | OUTPATIENT
Start: 2017-02-07 | End: 2017-02-07

## 2017-02-07 RX ADMIN — MIDAZOLAM HYDROCHLORIDE 1 MG: 1 INJECTION, SOLUTION INTRAMUSCULAR; INTRAVENOUS at 14:12

## 2017-02-07 RX ADMIN — FENTANYL CITRATE 50 MCG: 50 INJECTION, SOLUTION INTRAMUSCULAR; INTRAVENOUS at 14:29

## 2017-02-07 RX ADMIN — IOPAMIDOL 72 ML: 755 INJECTION, SOLUTION INTRAVENOUS at 14:32

## 2017-02-07 RX ADMIN — FENTANYL CITRATE 50 MCG: 50 INJECTION, SOLUTION INTRAMUSCULAR; INTRAVENOUS at 14:11

## 2017-02-07 RX ADMIN — SODIUM CHLORIDE 61 ML: 9 INJECTION, SOLUTION INTRAVENOUS at 14:33

## 2017-02-07 RX ADMIN — LIDOCAINE HYDROCHLORIDE 10 MG: 10 INJECTION, SOLUTION EPIDURAL; INFILTRATION; INTRACAUDAL; PERINEURAL at 14:34

## 2017-02-07 NOTE — PROGRESS NOTES
Sedation Post Procedure Summary:    Immediately prior to starting the procedure a Time Out was conducted with procedural staff and re-confirmed the patient s name, procedure, and site/side. md completed sedation assessment.   Consent obtained from patient after discussing the risks, benefits and alternatives.       Indication:  Sedation was required to allow for Ct guided liver biopsy    Sedatives: Fentanyl  100 mcg and Versed 1 mg    Vital signs, airway, and pulse oximetry were monitored throughout the procedure and sedation was maintained until the procedure was complete.      Patient tolerance: Patient tolerated the procedure well with no immediate complications. Mid abdomen biopsy site dry and intact with no bleeding at completion.    Post-procedure report given to: Care suites RN and pt transferred to room 12 via cart.    (See Doc Flow-sheets and MAR for additional information)    Andry Guthrie

## 2017-02-07 NOTE — PROGRESS NOTES
RADIOLOGY PROCEDURE NOTE  Patient name: Lorna Guzman  MRN: [id number]  : 1955    Pre-procedure diagnosis: Liver lesion  Post-procedure diagnosis: Same    Procedure Date/Time: 2017  2:41 PM  Procedure: Biopsy.  Estimated blood loss: None  Specimen(s) collected with description: Core biopsy samples.  The patient tolerated the procedure well with no immediate complications.    See imaging dictation for procedural details and findings.    Provider name: Michael Grimaldo  Assistant(s):None

## 2017-02-07 NOTE — TELEPHONE ENCOUNTER
Call from Dr. Lindo's office at [facility], the patient is HER-2 positive so will be starting Herceptin within the week.  Will message Dr. Spencer with update

## 2017-02-08 LAB — COPATH REPORT: NORMAL

## 2017-02-09 ENCOUNTER — TRANSFERRED RECORDS (OUTPATIENT)
Dept: SURGERY | Facility: CLINIC | Age: 62
End: 2017-02-09

## 2017-02-09 ENCOUNTER — TRANSFERRED RECORDS (OUTPATIENT)
Dept: CARDIOLOGY | Facility: CLINIC | Age: 62
End: 2017-02-09

## 2017-02-10 ENCOUNTER — HOSPITAL ENCOUNTER (OUTPATIENT)
Dept: CARDIOLOGY | Facility: CLINIC | Age: 62
Discharge: HOME | End: 2017-02-10
Attending: INTERNAL MEDICINE | Admitting: INTERNAL MEDICINE
Payer: COMMERCIAL

## 2017-02-10 ENCOUNTER — TELEPHONE (OUTPATIENT)
Dept: CARDIOLOGY | Facility: CLINIC | Age: 62
End: 2017-02-10

## 2017-02-10 VITALS — OXYGEN SATURATION: 98 % | DIASTOLIC BLOOD PRESSURE: 64 MMHG | SYSTOLIC BLOOD PRESSURE: 136 MMHG

## 2017-02-10 DIAGNOSIS — I42.9 CARDIOMYOPATHY, IDIOPATHIC (H): ICD-10-CM

## 2017-02-10 LAB
CREAT BLD-MCNC: 0.7 MG/DL (ref 0.52–1.04)
GFR SERPL CREATININE-BSD FRML MDRD: 86 ML/MIN/1.7M2

## 2017-02-10 PROCEDURE — 25500064 ZZH RX 255 OP 636: Performed by: INTERNAL MEDICINE

## 2017-02-10 PROCEDURE — 93017 CV STRESS TEST TRACING ONLY: CPT

## 2017-02-10 PROCEDURE — 75563 CARD MRI W/STRESS IMG & DYE: CPT | Mod: 26 | Performed by: INTERNAL MEDICINE

## 2017-02-10 PROCEDURE — 82565 ASSAY OF CREATININE: CPT

## 2017-02-10 PROCEDURE — 25000128 H RX IP 250 OP 636: Performed by: INTERNAL MEDICINE

## 2017-02-10 PROCEDURE — 93018 CV STRESS TEST I&R ONLY: CPT | Performed by: INTERNAL MEDICINE

## 2017-02-10 PROCEDURE — 93016 CV STRESS TEST SUPVJ ONLY: CPT | Performed by: INTERNAL MEDICINE

## 2017-02-10 PROCEDURE — A9585 GADOBUTROL INJECTION: HCPCS | Performed by: INTERNAL MEDICINE

## 2017-02-10 RX ORDER — ONDANSETRON 2 MG/ML
4 INJECTION INTRAMUSCULAR; INTRAVENOUS
Status: DISCONTINUED | OUTPATIENT
Start: 2017-02-10 | End: 2017-02-11 | Stop reason: HOSPADM

## 2017-02-10 RX ORDER — GADOBUTROL 604.72 MG/ML
5-65 INJECTION INTRAVENOUS ONCE
Status: COMPLETED | OUTPATIENT
Start: 2017-02-10 | End: 2017-02-10

## 2017-02-10 RX ORDER — REGADENOSON 0.08 MG/ML
0.4 INJECTION, SOLUTION INTRAVENOUS ONCE
Status: COMPLETED | OUTPATIENT
Start: 2017-02-10 | End: 2017-02-10

## 2017-02-10 RX ORDER — AMINOPHYLLINE 25 MG/ML
50-100 INJECTION, SOLUTION INTRAVENOUS
Status: DISCONTINUED | OUTPATIENT
Start: 2017-02-10 | End: 2017-02-11 | Stop reason: HOSPADM

## 2017-02-10 RX ORDER — DIPHENHYDRAMINE HCL 25 MG
25 CAPSULE ORAL
Status: DISCONTINUED | OUTPATIENT
Start: 2017-02-10 | End: 2017-02-11 | Stop reason: HOSPADM

## 2017-02-10 RX ORDER — AMINOPHYLLINE 25 MG/ML
100 INJECTION, SOLUTION INTRAVENOUS ONCE
Status: COMPLETED | OUTPATIENT
Start: 2017-02-10 | End: 2017-02-10

## 2017-02-10 RX ORDER — ALBUTEROL SULFATE 90 UG/1
2 AEROSOL, METERED RESPIRATORY (INHALATION) EVERY 5 MIN PRN
Status: DISCONTINUED | OUTPATIENT
Start: 2017-02-10 | End: 2017-02-11 | Stop reason: HOSPADM

## 2017-02-10 RX ORDER — ACYCLOVIR 200 MG/1
0-1 CAPSULE ORAL
Status: DISCONTINUED | OUTPATIENT
Start: 2017-02-10 | End: 2017-02-11 | Stop reason: HOSPADM

## 2017-02-10 RX ORDER — REGADENOSON 0.08 MG/ML
0.4 INJECTION, SOLUTION INTRAVENOUS ONCE
Status: DISCONTINUED | OUTPATIENT
Start: 2017-02-10 | End: 2017-02-11 | Stop reason: HOSPADM

## 2017-02-10 RX ORDER — METHYLPREDNISOLONE SODIUM SUCCINATE 125 MG/2ML
125 INJECTION, POWDER, LYOPHILIZED, FOR SOLUTION INTRAMUSCULAR; INTRAVENOUS
Status: DISCONTINUED | OUTPATIENT
Start: 2017-02-10 | End: 2017-02-11 | Stop reason: HOSPADM

## 2017-02-10 RX ORDER — DIPHENHYDRAMINE HYDROCHLORIDE 50 MG/ML
25-50 INJECTION INTRAMUSCULAR; INTRAVENOUS
Status: DISCONTINUED | OUTPATIENT
Start: 2017-02-10 | End: 2017-02-11 | Stop reason: HOSPADM

## 2017-02-10 RX ADMIN — AMINOPHYLLINE 100 MG: 25 INJECTION, SOLUTION INTRAVENOUS at 11:29

## 2017-02-10 RX ADMIN — REGADENOSON 0.4 MG: 0.08 INJECTION, SOLUTION INTRAVENOUS at 11:24

## 2017-02-10 RX ADMIN — GADOBUTROL 18 ML: 604.72 INJECTION INTRAVENOUS at 12:00

## 2017-02-10 NOTE — TELEPHONE ENCOUNTER
OV with BALDOMERO 2-20-17.   Cardiac MRI 2-10-17  Mild-to-moderate, non-ischemic cardiomyopathy with LVEF of 43% and  RVEF of 63%. 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

## 2017-02-13 ENCOUNTER — TELEPHONE (OUTPATIENT)
Dept: CARDIOLOGY | Facility: CLINIC | Age: 62
End: 2017-02-13

## 2017-02-14 NOTE — TELEPHONE ENCOUNTER
I spoke with Dr Lindo today regarding this patient and her MRI results. Her malignancy is strongly HER 2 positive, and herceptin would be the recommended treatment. Therefore I think it is reasonable to initiate therapy with a follow up CMR without contrast in 1 month to reassess her LVEF. She is at higher risk of cardiac complications due to her baseline cardiomyopathy but at this time he and I feel that the benefits outweigh the risks especially given her excellent functional status. She will continue with  medical therapy for her cardiomyopathy in close follow up with us.

## 2017-02-15 ENCOUNTER — DOCUMENTATION ONLY (OUTPATIENT)
Dept: CARDIOLOGY | Facility: CLINIC | Age: 62
End: 2017-02-15

## 2017-02-16 ENCOUNTER — DOCUMENTATION ONLY (OUTPATIENT)
Dept: CARDIOLOGY | Facility: CLINIC | Age: 62
End: 2017-02-16

## 2017-02-16 DIAGNOSIS — I42.9 SECONDARY CARDIOMYOPATHY (H): Primary | ICD-10-CM

## 2017-02-16 RX ORDER — LISINOPRIL 5 MG/1
2.5 TABLET ORAL DAILY
Qty: 15 TABLET | Refills: 3 | Status: SHIPPED | OUTPATIENT
Start: 2017-02-16 | End: 2017-03-16

## 2017-02-16 NOTE — PROGRESS NOTES
"Message from Dr. Spencer \"Can we start this patient on 2.5 MG of lisinopril daily as well? \"    Attempted to contact patient, left message for patient to call back    Spoke with patient, she is willing to start the lisinopril, Rx escripted for 1 month. Reviewed MRI preliminary report, patient will discuss further at OV 2/20/17.  "

## 2017-02-20 ENCOUNTER — OFFICE VISIT (OUTPATIENT)
Dept: CARDIOLOGY | Facility: CLINIC | Age: 62
End: 2017-02-20
Attending: INTERNAL MEDICINE
Payer: COMMERCIAL

## 2017-02-20 VITALS
BODY MASS INDEX: 21 KG/M2 | SYSTOLIC BLOOD PRESSURE: 126 MMHG | HEART RATE: 92 BPM | HEIGHT: 70 IN | DIASTOLIC BLOOD PRESSURE: 62 MMHG | WEIGHT: 146.7 LBS

## 2017-02-20 DIAGNOSIS — I42.9 SECONDARY CARDIOMYOPATHY (H): Primary | ICD-10-CM

## 2017-02-20 PROCEDURE — 99214 OFFICE O/P EST MOD 30 MIN: CPT | Performed by: NURSE PRACTITIONER

## 2017-02-20 RX ORDER — CARVEDILOL 6.25 MG/1
6.25 TABLET ORAL 2 TIMES DAILY WITH MEALS
Start: 2017-02-20 | End: 2017-03-07

## 2017-02-20 RX ORDER — PROCHLORPERAZINE MALEATE 10 MG
10 TABLET ORAL EVERY 6 HOURS PRN
COMMUNITY
End: 2017-08-04

## 2017-02-20 RX ORDER — LORAZEPAM 0.5 MG/1
0.5 TABLET ORAL EVERY 6 HOURS PRN
COMMUNITY
End: 2019-10-07

## 2017-02-20 NOTE — PROGRESS NOTES
Cardiology Clinic Progress Note  Lorna Guzman MRN# [id number]   YOB: 1955 Age: 61 year old     Reason For Visit: Follow-up stress cardiac MRI   Primary Cardiologist:   Dr. Spencer          History of Presenting Illness:    Lorna Guzman is a pleasant 61 year old patient with no past cardiac history. Past medical history significant for recurrent right breast carcinoma.  She has a history of right breast carcinoma diagnosed in 2006 and underwent lumpectomy and AC chemotherapy as well as adjuvant radiation therapy.  She was also placed on adjuvant tamoxifen.  After two years of tamoxifen she transitioned to Aromasin and completed treatment in 2013 with no evidence of recurrent disease.  Unfortunately, in late 2017 she noticed a right breast lump and was diagnosed with invasive ductal carcinoma of the right breast.  Current plan is for neoadjuvant chemotherapy with taxane, cyclophosphamide, as well as Herceptin, prior to definitive mastectomy and axillary lymph node dissection.  As part of her chemotherapy workup she underwent echocardiogram which noted a reduced LV systolic function of 47% and she was referred to cardiology.     Pt was last seen by Dr. Spencer on 2/6/2016 for consultation.  He noted that the most likely culprit for her cardiomyopathy was her prior history of Adriamycin therapy.  He recommended stress cardiac MRI  and initiated carvedilol 3.125 mg b.i.d.  He also had a baseline troponin and N-terminal proBNP drawn. Note from 2/13/2017 shows that Dr. Spencer discussed cardiac MRI results with Dr. Lindo noting that with her malignancy being HER-2 positive it was reasonable to start Herceptin and recommended follow-up cardiac MRI without contrast in one month to reassess her LVEF. This is reasonable as her benefits outweighed the risks given her excellent functional status.  He also started lisinopril 2.5 mg daily.    Pt presents today, with her daughter Federica, for follow-up stress cardiac  MRI. Stress cardiac MRI from 2/10/2017 shows LVEF 43% with mild to moderate diffuse hypokinesis, RVEF 63%, mild biatrial enlargement, myxomatous mitral valve with bileaflet prolapse and mild MR, TR prolapse with mild TR, no evidence of myocardial infarction fibrosis or infiltrative disease, and no ischemia with stress.  Her baseline troponin was less than 0.015 and N-terminal proBNP was 136. These results were reviewed with her today.     Since she was last seen, she has started carvedilol and lisinopril without any side effects.  She started the lisinopril last Friday and so we will set her up for a BMP in one week.  Her blood pressure today in the office is 126/62 and heart rate is 92. She has been doing well from a cardiac standpoint. We discussed low-sodium diet and daily weights. Patient reports no chest pain, shortness of breath, PND, orthopnea, presyncope, syncope, edema, heart racing, or palpitations.    Current Cardiac Medications   Carvedilol 3.125 mg b.i.d.  Lisinopril 2.5 mg daily                   Assessment and Plan:     Plan  1.  Increase carvedilol to 6.25 mg b.i.d.  2.  Call the clinic with any lightheadedness or dizziness  3.  BMP in one week to reassess kidney function and potassium on lisinopril  4.  Follow-up with [facility] in 2 weeks to uptitrate CM medications  5.  Cardiac MRI without contrast in one month to reassess EF, on Herceptin  6.  Check daily weights and call the clinic if your weight has increased more than 2 lbs in one day or 5 lbs in one week.  7.  2 Gm Na diet       1. Nonischemic Cardiomyopathy    Due to history of Adriamycin therapy    LVEF 43% on cardiac MRI 2/10/2017    No signs of heart failure    Increase beta blocker and continue lisinopril         Thank you for allowing me to participate in this delightful patient's care.      This note was completed in part using Dragon voice recognition software. Although reviewed after completion, some word and grammatical errors may  occur.    Maira Khanna, APRN, CNP           Data:   All laboratory data reviewed:    LAST CHOLESTEROL:  No results found for: CHOL  No results found for: HDL  No results found for: LDL  No results found for: TRIG  No results found for: CHOLHDLRATIO    LAST BMP:  Lab Results   Component Value Date     06/03/2011      Lab Results   Component Value Date    POTASSIUM 4.1 06/03/2011     Lab Results   Component Value Date    CHLORIDE 106 06/03/2011     Lab Results   Component Value Date    CAROLYN 9.5 06/03/2011     Lab Results   Component Value Date    CO2 28 06/03/2011     Lab Results   Component Value Date    BUN 14 06/03/2011     Lab Results   Component Value Date    CR 0.87 06/03/2011     Lab Results   Component Value Date    GLC 94 06/03/2011       LAST CBC:  Lab Results   Component Value Date    WBC 5.8 06/03/2011     Lab Results   Component Value Date    RBC 4.41 06/03/2011     Lab Results   Component Value Date    HGB 13.4 06/03/2011     Lab Results   Component Value Date    HCT 40.8 06/03/2011     Lab Results   Component Value Date    MCV 93 06/03/2011     Lab Results   Component Value Date    MCH 30.4 06/03/2011     Lab Results   Component Value Date    MCHC 32.8 06/03/2011     Lab Results   Component Value Date    RDW 13.2 06/03/2011     Lab Results   Component Value Date     02/07/2017

## 2017-02-20 NOTE — MR AVS SNAPSHOT
After Visit Summary   2/20/2017    Lorna Guzman    MRN: [id number]           Patient Information     Date Of Birth          1955        Visit Information        Provider Department      2/20/2017 1:50 PM Maira Khanna APRN CNP [facility]        Today's Diagnoses     Cardiomyopathy, idiopathic (H)    -  1      Care Instructions    Thanks for coming into [facility] clinic today.    We discussed:   1. Get (BMP) lab work done in 1 week (can be at [facility])  2. Call if you have lightheadedness or dizziness after increasing carvedilol   3.  Check daily weights and call the clinic if your weight has increased more than 2 lbs in one day or 5 lbs in one week.  4.  2000mg sodium diet     Medication changes:    INCREASE carvedilol to 6.25 mg twice a day (take 2 tabs of 3.125mg twice a day until gone, then call for new prescription)    Follow up:   Maira JAIMES in 2 weeks     Cardiac MRI in 1 month at [facility]     Please call my nurse Dana at [phone] with any questions or concerns.    Scheduling phone number: [phone]  Reminder: Please bring in all current medications, over the counter supplements and vitamin bottles to your next appointment.                Follow-ups after your visit        Additional Services     Follow-Up with Cardiac Advanced Practice Provider                 Future tests that were ordered for you today     Open Future Orders        Priority Expected Expires Ordered    MRI Cardiac w/o contrast Routine 2/21/2017 2/20/2018 2/20/2017    Basic metabolic panel Routine 2/24/2017 2/20/2019 2/20/2017    Follow-Up with Cardiac Advanced Practice Provider Routine 3/6/2017 2/20/2019 2/20/2017            Who to contact     If you have questions or need follow up information about today's clinic visit or your schedule please contact [facility] directly at  "[phone].  Normal or non-critical lab and imaging results will be communicated to you by MyChart, letter or phone within 4 business days after the clinic has received the results. If you do not hear from us within 7 days, please contact the clinic through MyChart or phone. If you have a critical or abnormal lab result, we will notify you by phone as soon as possible.  Submit refill requests through [employer] or call your pharmacy and they will forward the refill request to us. Please allow 3 business days for your refill to be completed.          Additional Information About Your Visit        Care EveryWhere ID     This is your Care EveryWhere ID. This could be used by other organizations to access your [place] medical records  [id number]        Your Vitals Were     Pulse Height BMI (Body Mass Index)             92 1.765 m (5' 9.5\") 21.35 kg/m2          Blood Pressure from Last 3 Encounters:   02/20/17 126/62   02/10/17 136/64   02/07/17 144/67    Weight from Last 3 Encounters:   02/20/17 66.5 kg (146 lb 11.2 oz)   02/06/17 67.1 kg (148 lb)   01/30/17 67.6 kg (149 lb)              We Performed the Following     Follow-Up with Cardiac Advanced Practice Provider          Today's Medication Changes          These changes are accurate as of: 2/20/17  2:36 PM.  If you have any questions, ask your nurse or doctor.               These medicines have changed or have updated prescriptions.        Dose/Directions    carvedilol 6.25 MG tablet   Commonly known as:  COREG   This may have changed:    - medication strength  - how much to take   Used for:  Cardiomyopathy, idiopathic (H)   Changed by:  Maira Khanna APRN CNP        Dose:  6.25 mg   Take 1 tablet (6.25 mg) by mouth 2 times daily (with meals)   Refills:  0            Where to get your medicines      Some of these will need a paper prescription and others can be bought over the counter.  Ask your nurse if you have questions.     You don't need a " prescription for these medications     carvedilol 6.25 MG tablet                Primary Care Provider Office Phone # Fax #    Max Moreno [phone]       [facility] [id number] [place] [id number]        Thank you!     Thank you for choosing [facility] [place]  for your care. Our goal is always to provide you with excellent care. Hearing back from our patients is one way we can continue to improve our services. Please take a few minutes to complete the written survey that you may receive in the mail after your visit with us. Thank you!             Your Updated Medication List - Protect others around you: Learn how to safely use, store and throw away your medicines at www.disposemymeds.org.          This list is accurate as of: 2/20/17  2:36 PM.  Always use your most recent med list.                   Brand Name Dispense Instructions for use    carvedilol 6.25 MG tablet    COREG     Take 1 tablet (6.25 mg) by mouth 2 times daily (with meals)       lisinopril 5 MG tablet    PRINIVIL/ZESTRIL    15 tablet    Take 0.5 tablets (2.5 mg) by mouth daily       LORazepam 0.5 MG tablet    ATIVAN     Take 0.5 mg by mouth every 6 hours as needed for anxiety       prochlorperazine 10 MG tablet    COMPAZINE     Take 10 mg by mouth every 6 hours as needed for nausea or vomiting

## 2017-02-20 NOTE — PATIENT INSTRUCTIONS
Thanks for coming into [facility] Heart clinic today.    We discussed:   1. Get (BMP) lab work done in 1 week (can be at [facility])  2. Call if you have lightheadedness or dizziness after increasing carvedilol   3.  Check daily weights and call the clinic if your weight has increased more than 2 lbs in one day or 5 lbs in one week.  4.  2000mg sodium diet     Medication changes:    INCREASE carvedilol to 6.25 mg twice a day (take 2 tabs of 3.125mg twice a day until gone, then call for new prescription)    Follow up:   Maira JAIMES in 2 weeks     Cardiac MRI in 1 month at [facility]     Please call my nurse Dana at [phone] with any questions or concerns.    Scheduling phone number: [phone]  Reminder: Please bring in all current medications, over the counter supplements and vitamin bottles to your next appointment.

## 2017-02-20 NOTE — LETTER
2/20/2017    Max Moreno MD  [facility]   [address] [address]    RE: Lorna Guzman       Dear Colleague,    I had the pleasure of seeing Lorna Guzman in the [facility].    HPI and Plan:   See dictation    Orders Placed This Encounter   Procedures     MRI Cardiac w/o contrast     Basic metabolic panel     Follow-Up with Cardiac Advanced Practice Provider       Orders Placed This Encounter   Medications     prochlorperazine (COMPAZINE) 10 MG tablet     Sig: Take 10 mg by mouth every 6 hours as needed for nausea or vomiting     LORazepam (ATIVAN) 0.5 MG tablet     Sig: Take 0.5 mg by mouth every 6 hours as needed for anxiety     carvedilol (COREG) 6.25 MG tablet     Sig: Take 1 tablet (6.25 mg) by mouth 2 times daily (with meals)       Medications Discontinued During This Encounter   Medication Reason     calcium carbonate (OS-CAROLYN 500 MG [place]. [place]) 500 MG tablet Medication Reconciliation Clean Up     MULTIPLE VITAMINS PO Medication Reconciliation Clean Up     carvedilol (COREG) 3.125 MG tablet Reorder         Encounter Diagnosis   Name Primary?     Cardiomyopathy, idiopathic (H) Yes       CURRENT MEDICATIONS:  Current Outpatient Prescriptions   Medication Sig Dispense Refill     prochlorperazine (COMPAZINE) 10 MG tablet Take 10 mg by mouth every 6 hours as needed for nausea or vomiting       LORazepam (ATIVAN) 0.5 MG tablet Take 0.5 mg by mouth every 6 hours as needed for anxiety       carvedilol (COREG) 6.25 MG tablet Take 1 tablet (6.25 mg) by mouth 2 times daily (with meals)       lisinopril (PRINIVIL/ZESTRIL) 5 MG tablet Take 0.5 tablets (2.5 mg) by mouth daily 15 tablet 3     [DISCONTINUED] carvedilol (COREG) 3.125 MG tablet Take 1 tablet (3.125 mg) by mouth 2 times daily (with meals) 90 tablet 3       ALLERGIES   No Known Allergies    PAST MEDICAL HISTORY:  Past Medical History   Diagnosis Date     Cancer (H)      breast  "      PAST SURGICAL HISTORY:  Past Surgical History   Procedure Laterality Date     Breast surgery       March 2006       FAMILY HISTORY:  Family History   Problem Relation Age of Onset     DIABETES Mother      Breast Cancer Mother      Ovarian Cancer Mother      Hypertension Father      Breast Cancer Sister        SOCIAL HISTORY:  Social History     Social History     Marital status:      Spouse name: N/A     Number of children: N/A     Years of education: N/A     Social History Main Topics     Smoking status: Never Smoker     Smokeless tobacco: None     Alcohol use No     Drug use: No     Sexual activity: Yes     Partners: Male     Other Topics Concern     None     Social History Narrative       Review of Systems:  Skin:  Negative       Eyes:  Negative      ENT:  Negative      Respiratory:  Negative       Cardiovascular:  Negative      Gastroenterology: Negative      Genitourinary:  Negative      Musculoskeletal:  Negative      Neurologic:  Negative      Psychiatric:  Positive for anxiety pos breat cancer  Heme/Lymph/Imm:  Negative      Endocrine:  Negative        Physical Exam:  Vitals: /62  Pulse 92  Ht 1.765 m (5' 9.5\")  Wt 66.5 kg (146 lb 11.2 oz)  BMI 21.35 kg/m2    Constitutional:  cooperative, alert and oriented, well developed, well nourished, in no acute distress        Skin:  warm and dry to the touch        Head:  normocephalic        Eyes:  sclera white        ENT:  no pallor or cyanosis        Neck:  carotid pulses are full and equal bilaterally;JVP normal        Chest:  normal breath sounds, clear to auscultation, normal A-P diameter, normal symmetry, normal respiratory excursion, no use of accessory muscles          Cardiac: regular rhythm                  Abdomen:  abdomen soft        Vascular: pulses full and equal                                        Extremities and Back:  no edema;no deformities, clubbing, cyanosis, erythema observed              Neurological:  affect appropriate, " oriented to time, person and place;no gross motor deficits              CC  Elias Spencer MD   [facility]  [address]  [place] [address]                Cardiology Clinic Progress Note  Lorna Guzman MRN# [id number]   YOB: 1955 Age: 61 year old     Reason For Visit: Follow-up stress cardiac MRI   Primary Cardiologist:   Dr. Spencer          History of Presenting Illness:    Lorna Guzman is a pleasant 61 year old patient with no past cardiac history. Past medical history significant for recurrent right breast carcinoma.  She has a history of right breast carcinoma diagnosed in 2006 and underwent lumpectomy and AC chemotherapy as well as adjuvant radiation therapy.  She was also placed on adjuvant tamoxifen.  After two years of tamoxifen she transitioned to Aromasin and completed treatment in 2013 with no evidence of recurrent disease.  Unfortunately, in late 2017 she noticed a right breast lump and was diagnosed with invasive ductal carcinoma of the right breast.  Current plan is for neoadjuvant chemotherapy with taxane, cyclophosphamide, as well as Herceptin, prior to definitive mastectomy and axillary lymph node dissection.  As part of her chemotherapy workup she underwent echocardiogram which noted a reduced LV systolic function of 47% and she was referred to cardiology.     Pt was last seen by Dr. Spencer on 2/6/2016 for consultation.  He noted that the most likely culprit for her cardiomyopathy was her prior history of Adriamycin therapy.  He recommended stress cardiac MRI  and initiated carvedilol 3.125 mg b.i.d.  He also had a baseline troponin and N-terminal proBNP drawn. Note from 2/13/2017 shows that Dr. Spencer discussed cardiac MRI results with Dr. Lindo noting that with her malignancy being HER-2 positive it was reasonable to start Herceptin and recommended follow-up cardiac MRI without contrast in one month to reassess her LVEF. This is reasonable as her benefits  outweighed the risks given her excellent functional status.  He also started lisinopril 2.5 mg daily.    Pt presents today, with her daughter Federica, for follow-up stress cardiac MRI. Stress cardiac MRI from 2/10/2017 shows LVEF 43% with mild to moderate diffuse hypokinesis, RVEF 63%, mild biatrial enlargement, myxomatous mitral valve with bileaflet prolapse and mild MR, TR prolapse with mild TR, no evidence of myocardial infarction fibrosis or infiltrative disease, and no ischemia with stress.  Her baseline troponin was less than 0.015 and N-terminal proBNP was 136. These results were reviewed with her today.     Since she was last seen, she has started carvedilol and lisinopril without any side effects.  She started the lisinopril last Friday and so we will set her up for a BMP in one week.  Her blood pressure today in the office is 126/62 and heart rate is 92. She has been doing well from a cardiac standpoint. We discussed low-sodium diet and daily weights. Patient reports no chest pain, shortness of breath, PND, orthopnea, presyncope, syncope, edema, heart racing, or palpitations.    Current Cardiac Medications   Carvedilol 3.125 mg b.i.d.  Lisinopril 2.5 mg daily                   Assessment and Plan:     Plan  1.  Increase carvedilol to 6.25 mg b.i.d.  2.  Call the clinic with any lightheadedness or dizziness  3.  BMP in one week to reassess kidney function and potassium on lisinopril  4.  Follow-up with [facility] in 2 weeks to uptitrate CM medications  5.  Cardiac MRI without contrast in one month to reassess EF, on Herceptin  6.  Check daily weights and call the clinic if your weight has increased more than 2 lbs in one day or 5 lbs in one week.  7.  2 Gm Na diet       1. Nonischemic Cardiomyopathy    Due to history of Adriamycin therapy    LVEF 43% on cardiac MRI 2/10/2017    No signs of heart failure    Increase beta blocker and continue lisinopril         Thank you for allowing me to participate in this  delightful patient's care.      This note was completed in part using Dragon voice recognition software. Although reviewed after completion, some word and grammatical errors may occur.    TIFFANI Giron, CNP           Data:   All laboratory data reviewed:    LAST CHOLESTEROL:  No results found for: CHOL  No results found for: HDL  No results found for: LDL  No results found for: TRIG  No results found for: CHOLHDLRATIO    LAST BMP:  Lab Results   Component Value Date     06/03/2011      Lab Results   Component Value Date    POTASSIUM 4.1 06/03/2011     Lab Results   Component Value Date    CHLORIDE 106 06/03/2011     Lab Results   Component Value Date    CAROLYN 9.5 06/03/2011     Lab Results   Component Value Date    CO2 28 06/03/2011     Lab Results   Component Value Date    BUN 14 06/03/2011     Lab Results   Component Value Date    CR 0.87 06/03/2011     Lab Results   Component Value Date    GLC 94 06/03/2011       LAST CBC:  Lab Results   Component Value Date    WBC 5.8 06/03/2011     Lab Results   Component Value Date    RBC 4.41 06/03/2011     Lab Results   Component Value Date    HGB 13.4 06/03/2011     Lab Results   Component Value Date    HCT 40.8 06/03/2011     Lab Results   Component Value Date    MCV 93 06/03/2011     Lab Results   Component Value Date    MCH 30.4 06/03/2011     Lab Results   Component Value Date    MCHC 32.8 06/03/2011     Lab Results   Component Value Date    RDW 13.2 06/03/2011     Lab Results   Component Value Date     02/07/2017     Thank you for allowing me to participate in the care of your patient.    Sincerely,     TIFFANI Giron CNP     [facility]

## 2017-02-20 NOTE — PROGRESS NOTES
HPI and Plan:   See dictation    Orders Placed This Encounter   Procedures     MRI Cardiac w/o contrast     Basic metabolic panel     Follow-Up with Cardiac Advanced Practice Provider       Orders Placed This Encounter   Medications     prochlorperazine (COMPAZINE) 10 MG tablet     Sig: Take 10 mg by mouth every 6 hours as needed for nausea or vomiting     LORazepam (ATIVAN) 0.5 MG tablet     Sig: Take 0.5 mg by mouth every 6 hours as needed for anxiety     carvedilol (COREG) 6.25 MG tablet     Sig: Take 1 tablet (6.25 mg) by mouth 2 times daily (with meals)       Medications Discontinued During This Encounter   Medication Reason     calcium carbonate (OS-CAROLYN 500 MG [place]. [place]) 500 MG tablet Medication Reconciliation Clean Up     MULTIPLE VITAMINS PO Medication Reconciliation Clean Up     carvedilol (COREG) 3.125 MG tablet Reorder         Encounter Diagnosis   Name Primary?     Cardiomyopathy, idiopathic (H) Yes       CURRENT MEDICATIONS:  Current Outpatient Prescriptions   Medication Sig Dispense Refill     prochlorperazine (COMPAZINE) 10 MG tablet Take 10 mg by mouth every 6 hours as needed for nausea or vomiting       LORazepam (ATIVAN) 0.5 MG tablet Take 0.5 mg by mouth every 6 hours as needed for anxiety       carvedilol (COREG) 6.25 MG tablet Take 1 tablet (6.25 mg) by mouth 2 times daily (with meals)       lisinopril (PRINIVIL/ZESTRIL) 5 MG tablet Take 0.5 tablets (2.5 mg) by mouth daily 15 tablet 3     [DISCONTINUED] carvedilol (COREG) 3.125 MG tablet Take 1 tablet (3.125 mg) by mouth 2 times daily (with meals) 90 tablet 3       ALLERGIES   No Known Allergies    PAST MEDICAL HISTORY:  Past Medical History   Diagnosis Date     Cancer (H)      breast       PAST SURGICAL HISTORY:  Past Surgical History   Procedure Laterality Date     Breast surgery       March 2006       FAMILY HISTORY:  Family History   Problem Relation Age of Onset     DIABETES Mother      Breast Cancer Mother      Ovarian Cancer Mother       "Hypertension Father      Breast Cancer Sister        SOCIAL HISTORY:  Social History     Social History     Marital status:      Spouse name: N/A     Number of children: N/A     Years of education: N/A     Social History Main Topics     Smoking status: Never Smoker     Smokeless tobacco: None     Alcohol use No     Drug use: No     Sexual activity: Yes     Partners: Male     Other Topics Concern     None     Social History Narrative       Review of Systems:  Skin:  Negative       Eyes:  Negative      ENT:  Negative      Respiratory:  Negative       Cardiovascular:  Negative      Gastroenterology: Negative      Genitourinary:  Negative      Musculoskeletal:  Negative      Neurologic:  Negative      Psychiatric:  Positive for anxiety pos breat cancer  Heme/Lymph/Imm:  Negative      Endocrine:  Negative        Physical Exam:  Vitals: /62  Pulse 92  Ht 1.765 m (5' 9.5\")  Wt 66.5 kg (146 lb 11.2 oz)  BMI 21.35 kg/m2    Constitutional:  cooperative, alert and oriented, well developed, well nourished, in no acute distress        Skin:  warm and dry to the touch        Head:  normocephalic        Eyes:  sclera white        ENT:  no pallor or cyanosis        Neck:  carotid pulses are full and equal bilaterally;JVP normal        Chest:  normal breath sounds, clear to auscultation, normal A-P diameter, normal symmetry, normal respiratory excursion, no use of accessory muscles          Cardiac: regular rhythm                  Abdomen:  abdomen soft        Vascular: pulses full and equal                                        Extremities and Back:  no edema;no deformities, clubbing, cyanosis, erythema observed              Neurological:  affect appropriate, oriented to time, person and place;no gross motor deficits              CC  Elias Spencer MD   [facility]  [address]  [place] [address]              "

## 2017-02-27 DIAGNOSIS — I42.9 SECONDARY CARDIOMYOPATHY (H): ICD-10-CM

## 2017-02-27 LAB
ANION GAP SERPL CALCULATED.3IONS-SCNC: 10 MMOL/L (ref 3–14)
BUN SERPL-MCNC: 15 MG/DL (ref 7–30)
CALCIUM SERPL-MCNC: 8.4 MG/DL (ref 8.5–10.1)
CHLORIDE SERPL-SCNC: 107 MMOL/L (ref 94–109)
CO2 SERPL-SCNC: 22 MMOL/L (ref 20–32)
CREAT SERPL-MCNC: 0.71 MG/DL (ref 0.52–1.04)
GFR SERPL CREATININE-BSD FRML MDRD: 84 ML/MIN/1.7M2
GLUCOSE SERPL-MCNC: 106 MG/DL (ref 70–99)
POTASSIUM SERPL-SCNC: 4.4 MMOL/L (ref 3.4–5.3)
SODIUM SERPL-SCNC: 139 MMOL/L (ref 133–144)

## 2017-02-27 PROCEDURE — 36415 COLL VENOUS BLD VENIPUNCTURE: CPT | Performed by: NURSE PRACTITIONER

## 2017-02-27 PROCEDURE — 80048 BASIC METABOLIC PNL TOTAL CA: CPT | Performed by: NURSE PRACTITIONER

## 2017-03-02 ENCOUNTER — TRANSFERRED RECORDS (OUTPATIENT)
Dept: SURGERY | Facility: CLINIC | Age: 62
End: 2017-03-02

## 2017-03-07 ENCOUNTER — OFFICE VISIT (OUTPATIENT)
Dept: CARDIOLOGY | Facility: CLINIC | Age: 62
End: 2017-03-07
Attending: NURSE PRACTITIONER
Payer: COMMERCIAL

## 2017-03-07 VITALS
SYSTOLIC BLOOD PRESSURE: 105 MMHG | WEIGHT: 144.7 LBS | BODY MASS INDEX: 20.71 KG/M2 | HEIGHT: 70 IN | HEART RATE: 91 BPM | DIASTOLIC BLOOD PRESSURE: 69 MMHG

## 2017-03-07 DIAGNOSIS — I42.9 SECONDARY CARDIOMYOPATHY (H): Primary | ICD-10-CM

## 2017-03-07 PROCEDURE — 99214 OFFICE O/P EST MOD 30 MIN: CPT | Performed by: NURSE PRACTITIONER

## 2017-03-07 RX ORDER — CARVEDILOL 12.5 MG/1
12.5 TABLET ORAL 2 TIMES DAILY WITH MEALS
Qty: 60 TABLET | Refills: 11 | Status: SHIPPED | OUTPATIENT
Start: 2017-03-07 | End: 2017-03-21

## 2017-03-07 NOTE — PATIENT INSTRUCTIONS
Thanks for coming into [facility] clinic today.    We discussed:   Call if any lightheadedness or dizziness   Check blood pressure and heart rate if lightheaded or dizzy  Top blood pressure number should not be <90     Medication changes:    INCREASE carvedilol to 12.5 twice a day- sent to [facility]     Follow up:   Dr. Spencer in 2 weeks after MRI on 3/20       Please call my nurse Dana at [phone] with any questions or concerns.    Scheduling phone number: [phone]  Reminder: Please bring in all current medications, over the counter supplements and vitamin bottles to your next appointment.

## 2017-03-07 NOTE — PROGRESS NOTES
HPI and Plan:   See dictation    Orders Placed This Encounter   Procedures     Follow-Up with Cardiologist       Orders Placed This Encounter   Medications     DOCETAXEL, NON-ALCOHOL, IV     Sig: Chemo agent     cyclophosphamide (CYTOXAN) 10 mg/mL SOLN     Sig: Take by mouth daily Chemo agent     TRASTUZUMAB IV     Sig: Chemo agent     carvedilol (COREG) 12.5 MG tablet     Sig: Take 1 tablet (12.5 mg) by mouth 2 times daily (with meals)     Dispense:  60 tablet     Refill:  11       Medications Discontinued During This Encounter   Medication Reason     carvedilol (COREG) 6.25 MG tablet Reorder         Encounter Diagnosis   Name Primary?     Secondary cardiomyopathy (H)        CURRENT MEDICATIONS:  Current Outpatient Prescriptions   Medication Sig Dispense Refill     DOCETAXEL, NON-ALCOHOL, IV Chemo agent       cyclophosphamide (CYTOXAN) 10 mg/mL SOLN Take by mouth daily Chemo agent       TRASTUZUMAB IV Chemo agent       carvedilol (COREG) 12.5 MG tablet Take 1 tablet (12.5 mg) by mouth 2 times daily (with meals) 60 tablet 11     prochlorperazine (COMPAZINE) 10 MG tablet Take 10 mg by mouth every 6 hours as needed for nausea or vomiting       LORazepam (ATIVAN) 0.5 MG tablet Take 0.5 mg by mouth every 6 hours as needed for anxiety       lisinopril (PRINIVIL/ZESTRIL) 5 MG tablet Take 0.5 tablets (2.5 mg) by mouth daily 15 tablet 3     [DISCONTINUED] carvedilol (COREG) 6.25 MG tablet Take 1 tablet (6.25 mg) by mouth 2 times daily (with meals)         ALLERGIES   No Known Allergies    PAST MEDICAL HISTORY:  Past Medical History   Diagnosis Date     Cancer (H)      breast       PAST SURGICAL HISTORY:  Past Surgical History   Procedure Laterality Date     Breast surgery       March 2006       FAMILY HISTORY:  Family History   Problem Relation Age of Onset     DIABETES Mother      Breast Cancer Mother      Ovarian Cancer Mother      Hypertension Father      Breast Cancer Sister        SOCIAL HISTORY:  Social History  "    Social History     Marital status:      Spouse name: N/A     Number of children: N/A     Years of education: N/A     Social History Main Topics     Smoking status: Never Smoker     Smokeless tobacco: None     Alcohol use No     Drug use: No     Sexual activity: Yes     Partners: Male     Other Topics Concern     None     Social History Narrative       Review of Systems:  Skin:  Negative       Eyes:  Negative      ENT:  Negative      Respiratory:  Negative       Cardiovascular:  Negative      Gastroenterology: Negative      Genitourinary:  Negative      Musculoskeletal:  Negative      Neurologic:  Negative      Psychiatric:  Positive for anxiety (improving) pos breat cancer  Heme/Lymph/Imm:  Negative      Endocrine:  Negative        Physical Exam:  Vitals: /69 (BP Location: Left arm, Patient Position: Chair, Cuff Size: Adult Regular)  Pulse 91  Ht 1.765 m (5' 9.5\")  Wt 65.6 kg (144 lb 11.2 oz)  BMI 21.06 kg/m2    Constitutional:  cooperative;alert and oriented thin      Skin:  warm and dry to the touch        Head:  normocephalic        Eyes:  sclera white        ENT:  no pallor or cyanosis        Neck:  carotid pulses are full and equal bilaterally;JVP normal        Chest:  normal breath sounds, clear to auscultation, normal A-P diameter, normal symmetry, normal respiratory excursion, no use of accessory muscles          Cardiac: regular rhythm                  Abdomen:  abdomen soft        Vascular: pulses full and equal                                        Extremities and Back:  no edema;no deformities, clubbing, cyanosis, erythema observed              Neurological:  affect appropriate, oriented to time, person and place;no gross motor deficits              CC  Maira Khanna, TIFFANI CNP   [facility]  [address]  [place] [address]              "

## 2017-03-07 NOTE — PROGRESS NOTES
Cardiology Clinic Progress Note  Lorna Guzman MRN# [id number]   YOB: 1955 Age: 61 year old     Reason For Visit: Follow-up cardiomyopathy   Primary Cardiologist:   Dr. Spencer          History of Presenting Illness:    Lorna Guzman is a pleasant 61 year old patient with no past cardiac history. Past medical history significant for recurrent right breast carcinoma.  She has a history of right breast carcinoma diagnosed in 2006 and underwent lumpectomy and AC chemotherapy as well as adjuvant radiation therapy.  She was also placed on adjuvant tamoxifen.  After two years of tamoxifen she transitioned to Aromasin and completed treatment in 2013 with no evidence of recurrent disease.  Unfortunately, in late 2016 she noticed a right breast lump and was diagnosed with invasive ductal carcinoma of the right breast.  Current plan is for neoadjuvant chemotherapy with taxane, cyclophosphamide, as well as Herceptin, prior to definitive mastectomy and axillary lymph node dissection.  As part of her chemotherapy workup she underwent echocardiogram which noted a reduced LV systolic function of 47% and she was referred to cardiology.     Pt saw Dr. Spencer on 2/6/2017 for consultation.  He noted that the most likely culprit for her cardiomyopathy was her prior history of Adriamycin therapy.  He initiated carvedilol 3.125 mg b.i.d. and lisinopril 2.5 mg daily. Note from 2/13/2017 shows that Dr. Spencer discussed cardiac MRI results with Dr. Lindo noting that with her malignancy being HER-2 positive it was reasonable to start Herceptin and recommended follow-up cardiac MRI without contrast in one month to reassess her LVEF. This is reasonable as her benefits outweighed the risks given her excellent functional status.     Patient was last seen by me on 2/20/2017. Her stress cardiac MRI from 2/10/2017 shows LVEF 43% with mild to moderate diffuse hypokinesis, RVEF 63%, mild biatrial enlargement, myxomatous mitral valve  with bileaflet prolapse and mild MR, TR prolapse with mild TR, no evidence of myocardial infarction fibrosis or infiltrative disease, and no ischemia with stress.  Her baseline troponin was less than 0.015 and N-terminal proBNP was 136.  BMP after starting lisinopril showed normal potassium and renal function.  Her carvedilol was increased to 6.25 mg b.i.d. and a low-sodium diet with daily weights was discussed.    Pt presents today, with her daughter Federica, for follow-up cardiomyopathy.  She has been taking increased carvedilol 6.25 mg b.i.d. without any side effects.  Her blood pressure today in the clinic is 105/69 and heart rate is 91.  She denies any lightheadedness or dizziness.  She has not noted any increase in her weight.  Today she is down 2 pounds from two weeks ago. Overall, she has been doing well from a cardiac standpoint.  She has also been doing well with her chemotherapy treatments.  She gets worn out and sleeps a lot after the treatment but denies any nausea.  She has noticed a decreased appetite.  Her next chemotherapy treatment will be on Thursday, 3/23/2017.  Patient reports no chest pain, shortness of breath, PND, orthopnea, presyncope, syncope, edema, heart racing, or palpitations.        Current Cardiac Medications   Carvedilol 6.25 mg b.i.d.  Lisinopril 2.5 mg daily                   Assessment and Plan:     Plan  1.  Increase carvedilol to 12.5 mg b.i.d.  2.  Call the clinic with any lightheadedness, dizziness, or if SBP is <90  3.  Check daily weights and call the clinic if your weight has increased more than 2 lbs in one day or 5 lbs in one week.  4.  2 Gm Na diet   5.  Cardiac MRI without contrast 3/20, to reassess EF on Herceptin  6.  Follow-up with [facility] in 2 weeks to uptitrate CM medication and review CMR        1. Nonischemic Cardiomyopathy    Due to history of Adriamycin therapy    LVEF 43% on cardiac MRI 2/10/2017    No signs of heart failure    Increase beta blocker and continue  lisinopril          Thank you for allowing me to participate in this delightful patient's care.      This note was completed in part using Dragon voice recognition software. Although reviewed after completion, some word and grammatical errors may occur.    Maira Khanna, APRN, CNP           Data:   All laboratory data reviewed:    LAST CHOLESTEROL:  No results found for: CHOL  No results found for: HDL  No results found for: LDL  No results found for: TRIG  No results found for: CHOLHDLRATIO    LAST BMP:  Lab Results   Component Value Date     06/03/2011      Lab Results   Component Value Date    POTASSIUM 4.1 06/03/2011     Lab Results   Component Value Date    CHLORIDE 106 06/03/2011     Lab Results   Component Value Date    CAROLYN 9.5 06/03/2011     Lab Results   Component Value Date    CO2 28 06/03/2011     Lab Results   Component Value Date    BUN 14 06/03/2011     Lab Results   Component Value Date    CR 0.87 06/03/2011     Lab Results   Component Value Date    GLC 94 06/03/2011       LAST CBC:  Lab Results   Component Value Date    WBC 5.8 06/03/2011     Lab Results   Component Value Date    RBC 4.41 06/03/2011     Lab Results   Component Value Date    HGB 13.4 06/03/2011     Lab Results   Component Value Date    HCT 40.8 06/03/2011     Lab Results   Component Value Date    MCV 93 06/03/2011     Lab Results   Component Value Date    MCH 30.4 06/03/2011     Lab Results   Component Value Date    MCHC 32.8 06/03/2011     Lab Results   Component Value Date    RDW 13.2 06/03/2011     Lab Results   Component Value Date     02/07/2017

## 2017-03-07 NOTE — LETTER
3/7/2017    Max Moreno MD  [facility]   [address] [address] [address]    RE: Lorna Guzman       Dear Colleague,    I had the pleasure of seeing Lorna Guzman in the [facility].    HPI and Plan:   See dictation    Orders Placed This Encounter   Procedures     Follow-Up with Cardiologist       Orders Placed This Encounter   Medications     DOCETAXEL, NON-ALCOHOL, IV     Sig: Chemo agent     cyclophosphamide (CYTOXAN) 10 mg/mL SOLN     Sig: Take by mouth daily Chemo agent     TRASTUZUMAB IV     Sig: Chemo agent     carvedilol (COREG) 12.5 MG tablet     Sig: Take 1 tablet (12.5 mg) by mouth 2 times daily (with meals)     Dispense:  60 tablet     Refill:  11       Medications Discontinued During This Encounter   Medication Reason     carvedilol (COREG) 6.25 MG tablet Reorder         Encounter Diagnosis   Name Primary?     Secondary cardiomyopathy (H)        CURRENT MEDICATIONS:  Current Outpatient Prescriptions   Medication Sig Dispense Refill     DOCETAXEL, NON-ALCOHOL, IV Chemo agent       cyclophosphamide (CYTOXAN) 10 mg/mL SOLN Take by mouth daily Chemo agent       TRASTUZUMAB IV Chemo agent       carvedilol (COREG) 12.5 MG tablet Take 1 tablet (12.5 mg) by mouth 2 times daily (with meals) 60 tablet 11     prochlorperazine (COMPAZINE) 10 MG tablet Take 10 mg by mouth every 6 hours as needed for nausea or vomiting       LORazepam (ATIVAN) 0.5 MG tablet Take 0.5 mg by mouth every 6 hours as needed for anxiety       lisinopril (PRINIVIL/ZESTRIL) 5 MG tablet Take 0.5 tablets (2.5 mg) by mouth daily 15 tablet 3     [DISCONTINUED] carvedilol (COREG) 6.25 MG tablet Take 1 tablet (6.25 mg) by mouth 2 times daily (with meals)         ALLERGIES   No Known Allergies    PAST MEDICAL HISTORY:  Past Medical History   Diagnosis Date     Cancer (H)      breast       PAST SURGICAL HISTORY:  Past Surgical History   Procedure Laterality Date     Breast  "surgery       March 2006       FAMILY HISTORY:  Family History   Problem Relation Age of Onset     DIABETES Mother      Breast Cancer Mother      Ovarian Cancer Mother      Hypertension Father      Breast Cancer Sister        SOCIAL HISTORY:  Social History     Social History     Marital status:      Spouse name: N/A     Number of children: N/A     Years of education: N/A     Social History Main Topics     Smoking status: Never Smoker     Smokeless tobacco: None     Alcohol use No     Drug use: No     Sexual activity: Yes     Partners: Male     Other Topics Concern     None     Social History Narrative       Review of Systems:  Skin:  Negative       Eyes:  Negative      ENT:  Negative      Respiratory:  Negative       Cardiovascular:  Negative      Gastroenterology: Negative      Genitourinary:  Negative      Musculoskeletal:  Negative      Neurologic:  Negative      Psychiatric:  Positive for anxiety (improving) pos breat cancer  Heme/Lymph/Imm:  Negative      Endocrine:  Negative        Physical Exam:  Vitals: /69 (BP Location: Left arm, Patient Position: Chair, Cuff Size: Adult Regular)  Pulse 91  Ht 1.765 m (5' 9.5\")  Wt 65.6 kg (144 lb 11.2 oz)  BMI 21.06 kg/m2    Constitutional:  cooperative;alert and oriented thin      Skin:  warm and dry to the touch        Head:  normocephalic        Eyes:  sclera white        ENT:  no pallor or cyanosis        Neck:  carotid pulses are full and equal bilaterally;JVP normal        Chest:  normal breath sounds, clear to auscultation, normal A-P diameter, normal symmetry, normal respiratory excursion, no use of accessory muscles          Cardiac: regular rhythm                  Abdomen:  abdomen soft        Vascular: pulses full and equal                                        Extremities and Back:  no edema;no deformities, clubbing, cyanosis, erythema observed              Neurological:  affect appropriate, oriented to time, person and place;no gross motor " deficits              CC  Maira Khanna, TIFFANI CNP   [facility]  [address]  [place] [address]                Cardiology Clinic Progress Note  Lorna Guzman MRN# [id number]   YOB: 1955 Age: 61 year old     Reason For Visit: Follow-up cardiomyopathy   Primary Cardiologist:   Dr. Spencer          History of Presenting Illness:    Lorna Guzman is a pleasant 61 year old patient with no past cardiac history. Past medical history significant for recurrent right breast carcinoma.  She has a history of right breast carcinoma diagnosed in 2006 and underwent lumpectomy and AC chemotherapy as well as adjuvant radiation therapy.  She was also placed on adjuvant tamoxifen.  After two years of tamoxifen she transitioned to Aromasin and completed treatment in 2013 with no evidence of recurrent disease.  Unfortunately, in late 2016 she noticed a right breast lump and was diagnosed with invasive ductal carcinoma of the right breast.  Current plan is for neoadjuvant chemotherapy with taxane, cyclophosphamide, as well as Herceptin, prior to definitive mastectomy and axillary lymph node dissection.  As part of her chemotherapy workup she underwent echocardiogram which noted a reduced LV systolic function of 47% and she was referred to cardiology.     Pt saw Dr. Spencer on 2/6/2017 for consultation.  He noted that the most likely culprit for her cardiomyopathy was her prior history of Adriamycin therapy.  He initiated carvedilol 3.125 mg b.i.d. and lisinopril 2.5 mg daily. Note from 2/13/2017 shows that Dr. Spencer discussed cardiac MRI results with Dr. Lindo noting that with her malignancy being HER-2 positive it was reasonable to start Herceptin and recommended follow-up cardiac MRI without contrast in one month to reassess her LVEF. This is reasonable as her benefits outweighed the risks given her excellent functional status.     Patient was last seen by me on 2/20/2017. Her stress  cardiac MRI from 2/10/2017 shows LVEF 43% with mild to moderate diffuse hypokinesis, RVEF 63%, mild biatrial enlargement, myxomatous mitral valve with bileaflet prolapse and mild MR, TR prolapse with mild TR, no evidence of myocardial infarction fibrosis or infiltrative disease, and no ischemia with stress.  Her baseline troponin was less than 0.015 and N-terminal proBNP was 136.  BMP after starting lisinopril showed normal potassium and renal function.  Her carvedilol was increased to 6.25 mg b.i.d. and a low-sodium diet with daily weights was discussed.    Pt presents today, with her daughter Federica, for follow-up cardiomyopathy.  She has been taking increased carvedilol 6.25 mg b.i.d. without any side effects.  Her blood pressure today in the clinic is 105/69 and heart rate is 91.  She denies any lightheadedness or dizziness.  She has not noted any increase in her weight.  Today she is down 2 pounds from two weeks ago. Overall, she has been doing well from a cardiac standpoint.  She has also been doing well with her chemotherapy treatments.  She gets worn out and sleeps a lot after the treatment but denies any nausea.  She has noticed a decreased appetite.  Her next chemotherapy treatment will be on Thursday, 3/23/2017.  Patient reports no chest pain, shortness of breath, PND, orthopnea, presyncope, syncope, edema, heart racing, or palpitations.        Current Cardiac Medications   Carvedilol 6.25 mg b.i.d.  Lisinopril 2.5 mg daily                   Assessment and Plan:     Plan  1.  Increase carvedilol to 12.5 mg b.i.d.  2.  Call the clinic with any lightheadedness, dizziness, or if SBP is <90  3.  Check daily weights and call the clinic if your weight has increased more than 2 lbs in one day or 5 lbs in one week.  4.  2 Gm Na diet   5.  Cardiac MRI without contrast 3/20, to reassess EF on Herceptin  6.  Follow-up with [facility] in 2 weeks to uptitrate CM medication and review CMR        1. Nonischemic  Cardiomyopathy    Due to history of Adriamycin therapy    LVEF 43% on cardiac MRI 2/10/2017    No signs of heart failure    Increase beta blocker and continue lisinopril          Thank you for allowing me to participate in this delightful patient's care.      This note was completed in part using Dragon voice recognition software. Although reviewed after completion, some word and grammatical errors may occur.    TIFFANI Giron, CNP           Data:   All laboratory data reviewed:    LAST CHOLESTEROL:  No results found for: CHOL  No results found for: HDL  No results found for: LDL  No results found for: TRIG  No results found for: CHOLHDLRATIO    LAST BMP:  Lab Results   Component Value Date     06/03/2011      Lab Results   Component Value Date    POTASSIUM 4.1 06/03/2011     Lab Results   Component Value Date    CHLORIDE 106 06/03/2011     Lab Results   Component Value Date    CAROLYN 9.5 06/03/2011     Lab Results   Component Value Date    CO2 28 06/03/2011     Lab Results   Component Value Date    BUN 14 06/03/2011     Lab Results   Component Value Date    CR 0.87 06/03/2011     Lab Results   Component Value Date    GLC 94 06/03/2011       LAST CBC:  Lab Results   Component Value Date    WBC 5.8 06/03/2011     Lab Results   Component Value Date    RBC 4.41 06/03/2011     Lab Results   Component Value Date    HGB 13.4 06/03/2011     Lab Results   Component Value Date    HCT 40.8 06/03/2011     Lab Results   Component Value Date    MCV 93 06/03/2011     Lab Results   Component Value Date    MCH 30.4 06/03/2011     Lab Results   Component Value Date    MCHC 32.8 06/03/2011     Lab Results   Component Value Date    RDW 13.2 06/03/2011     Lab Results   Component Value Date     02/07/2017     Thank you for allowing me to participate in the care of your patient.    Sincerely,     TIFFANI Giron CNP     [facility]

## 2017-03-07 NOTE — MR AVS SNAPSHOT
After Visit Summary   3/7/2017    Lorna Guzman    MRN: [id number]           Patient Information     Date Of Birth          1955        Visit Information        Provider Department      3/7/2017 10:10 AM Maira Khanna APRN CNP [facility] PHYSICIANS HEART AT [place]        Today's Diagnoses     Secondary cardiomyopathy (H)          Care Instructions    Thanks for coming into [facility] Heart clinic today.    We discussed:   Call if any lightheadedness or dizziness   Check blood pressure and heart rate if lightheaded or dizzy  Top blood pressure number should not be <90     Medication changes:    INCREASE carvedilol to 12.5 twice a day- sent to [facility]     Follow up:   Dr. Spencer in 2 weeks after MRI on 3/20       Please call my nurse Dana at [phone] with any questions or concerns.    Scheduling phone number: [phone]  Reminder: Please bring in all current medications, over the counter supplements and vitamin bottles to your next appointment.                Follow-ups after your visit        Additional Services     Follow-Up with Cardiologist                 Your next 10 appointments already scheduled     Mar 20, 2017  9:30 AM CDT   MR MYOCARDIUM W/O CONTRAST with SCIMR1   [facility] (Cardiovascular Imaging at [facility])    [address] [place] [address]  [address] [place] [address] [phone]           Take your medicines as usual, unless your doctor tells you not to. Bring a list of your current medicines to your exam (including vitamins, minerals and over-the-counter drugs). Also bring the results of similar scans you may have had.  Please remove any body piercings and hair extensions before you arrive.  Follow your doctor s orders. If you do not, we may have to postpone your exam.  You will not have contrast for this exam. You do not need to do anything special to prepare.  The MRI machine uses a strong  magnet. Please wear clothes without metal (snaps, zippers). A sweatsuit works well, or we may give you a hospital gown.   **IMPORTANT** THE INSTRUCTIONS BELOW ARE ONLY FOR THOSE PATIENTS WHO HAVE BEEN TOLD THEY WILL RECEIVE SEDATION OR GENERAL ANESTHESIA DURING THEIR MRI PROCEDURE:  IF YOU WILL RECEIVE SEDATION (take medicine to help you relax during your exam):   You must get the medicine from your doctor before you arrive. Bring the medicine to the exam. Do not take it at home.   Arrive one hour early. Bring someone who can take you home after the test. Your medicine will make you sleepy. After the exam, you may not drive, take a bus or take a taxi by yourself.   No eating 8 hours before your exam. You may have clear liquids up until 4 hours before your exam. (Clear liquids include water, clear tea, black coffee and fruit juice without pulp.)  IF YOU WILL RECEIVE ANESTHESIA (be asleep for your exam):   Arrive 1 1/2 hours early. Bring someone who can take you home after the test. You may not drive, take a bus or take a taxi by yourself.   No eating 8 hours before your exam. You may have clear liquids up until 4 hours before your exam. (Clear liquids include water, clear tea, black coffee and fruit juice without pulp.)   You will spend four to five hours in the recovery room.  Please call the Imaging Department at your exam site with any questions.              Future tests that were ordered for you today     Open Future Orders        Priority Expected Expires Ordered    Follow-Up with Cardiologist Routine 3/21/2017 3/7/2019 3/7/2017            Who to contact     If you have questions or need follow up information about today's clinic visit or your schedule please contact [facility] PHYSICIANS HEART AT [place] directly at [phone].  Normal or non-critical lab and imaging results will be communicated to you by MyChart, letter or phone within 4 business days after the clinic has received the results.  "If you do not hear from us within 7 days, please contact the clinic through [employer] My [employer] or phone. If you have a critical or abnormal lab result, we will notify you by phone as soon as possible.  Submit refill requests through [employer] or call your pharmacy and they will forward the refill request to us. Please allow 3 business days for your refill to be completed.          Additional Information About Your Visit        Ease My [employer] Information     [employer] lets you send messages to your doctor, view your test results, renew your prescriptions, schedule appointments and more. To sign up, go to [email or web address] . Click on \"Log in\" on the left side of the screen, which will take you to the Welcome page. Then click on \"Sign up Now\" on the right side of the page.     You will be asked to enter the access code listed below, as well as some personal information. Please follow the directions to create your username and password.     Your access code is: [email or web address]  Expires: 2017 10:38 AM     Your access code will  in 90 days. If you need help or a new code, please call your [facility] clinic or [phone].        Care EveryWhere ID     This is your Care EveryWhere ID. This could be used by other organizations to access your [facility] medical records  [id number]        Your Vitals Were     Pulse Height BMI (Body Mass Index)             91 1.765 m (5' 9.5\") 21.06 kg/m2          Blood Pressure from Last 3 Encounters:   17 105/69   17 126/62   02/10/17 136/64    Weight from Last 3 Encounters:   17 65.6 kg (144 lb 11.2 oz)   17 66.5 kg (146 lb 11.2 oz)   17 67.1 kg (148 lb)              We Performed the Following     Follow-Up with Cardiac Advanced Practice Provider          Today's Medication Changes          These changes are accurate as of: 3/7/17 10:38 AM.  If you have any questions, ask your nurse or doctor.               These medicines have changed or have updated prescriptions.     "    Dose/Directions    carvedilol 12.5 MG tablet   Commonly known as:  COREG   This may have changed:    - medication strength  - how much to take   Used for:  Secondary cardiomyopathy (H)   Changed by:  Maira Khanna APRN CNP        Dose:  12.5 mg   Take 1 tablet (12.5 mg) by mouth 2 times daily (with meals)   Quantity:  60 tablet   Refills:  11            Where to get your medicines      These medications were sent to [id number] - [facility] [address] AT [address], [facility] [address]     Phone:  [phone]     carvedilol 12.5 MG tablet                Primary Care Provider Office Phone # Fax #    Max Moreno [phone]       [facility] [address] [place] [id number]        Thank you!     Thank you for choosing [facility] PHYSICIANS HEART AT [place]  for your care. Our goal is always to provide you with excellent care. Hearing back from our patients is one way we can continue to improve our services. Please take a few minutes to complete the written survey that you may receive in the mail after your visit with us. Thank you!             Your Updated Medication List - Protect others around you: Learn how to safely use, store and throw away your medicines at www.disposemymeds.org.          This list is accurate as of: 3/7/17 10:38 AM.  Always use your most recent med list.                   Brand Name Dispense Instructions for use    carvedilol 12.5 MG tablet    COREG    60 tablet    Take 1 tablet (12.5 mg) by mouth 2 times daily (with meals)       cyclophosphamide 10 mg/mL Soln    CYTOXAN     Take by mouth daily Chemo agent       DOCETAXEL (NON-ALCOHOL) IV      Chemo agent       lisinopril 5 MG tablet    PRINIVIL/ZESTRIL    15 tablet    Take 0.5 tablets (2.5 mg) by mouth daily       LORazepam 0.5 MG tablet    ATIVAN     Take 0.5 mg by mouth every 6 hours as  needed for anxiety       prochlorperazine 10 MG tablet    COMPAZINE     Take 10 mg by mouth every 6 hours as needed for nausea or vomiting       TRASTUZUMAB IV      Chemo agent

## 2017-03-08 ENCOUNTER — TRANSFERRED RECORDS (OUTPATIENT)
Dept: SURGERY | Facility: CLINIC | Age: 62
End: 2017-03-08

## 2017-03-08 ENCOUNTER — DOCUMENTATION ONLY (OUTPATIENT)
Dept: CARDIOLOGY | Facility: CLINIC | Age: 62
End: 2017-03-08

## 2017-03-14 ENCOUNTER — DOCUMENTATION ONLY (OUTPATIENT)
Dept: CARDIOLOGY | Facility: CLINIC | Age: 62
End: 2017-03-14

## 2017-03-16 DIAGNOSIS — I42.9 SECONDARY CARDIOMYOPATHY (H): ICD-10-CM

## 2017-03-16 RX ORDER — LISINOPRIL 5 MG/1
2.5 TABLET ORAL DAILY
Qty: 45 TABLET | Refills: 3 | Status: SHIPPED | OUTPATIENT
Start: 2017-03-16 | End: 2017-04-04

## 2017-03-20 ENCOUNTER — HOSPITAL ENCOUNTER (OUTPATIENT)
Dept: CARDIOLOGY | Facility: CLINIC | Age: 62
Discharge: HOME | End: 2017-03-20
Attending: NURSE PRACTITIONER | Admitting: NURSE PRACTITIONER
Payer: COMMERCIAL

## 2017-03-20 DIAGNOSIS — I42.9 SECONDARY CARDIOMYOPATHY (H): ICD-10-CM

## 2017-03-20 PROCEDURE — 75557 CARDIAC MRI FOR MORPH: CPT | Mod: 26 | Performed by: INTERNAL MEDICINE

## 2017-03-20 PROCEDURE — 75557 CARDIAC MRI FOR MORPH: CPT

## 2017-03-21 ENCOUNTER — OFFICE VISIT (OUTPATIENT)
Dept: CARDIOLOGY | Facility: CLINIC | Age: 62
End: 2017-03-21
Attending: NURSE PRACTITIONER
Payer: COMMERCIAL

## 2017-03-21 VITALS
HEIGHT: 69 IN | DIASTOLIC BLOOD PRESSURE: 70 MMHG | BODY MASS INDEX: 21.21 KG/M2 | SYSTOLIC BLOOD PRESSURE: 122 MMHG | HEART RATE: 72 BPM | WEIGHT: 143.2 LBS

## 2017-03-21 DIAGNOSIS — I42.9 SECONDARY CARDIOMYOPATHY (H): Primary | ICD-10-CM

## 2017-03-21 PROCEDURE — 99214 OFFICE O/P EST MOD 30 MIN: CPT | Performed by: NURSE PRACTITIONER

## 2017-03-21 RX ORDER — CARVEDILOL 25 MG/1
25 TABLET ORAL 2 TIMES DAILY WITH MEALS
Start: 2017-03-21 | End: 2017-03-30

## 2017-03-21 NOTE — MR AVS SNAPSHOT
After Visit Summary   3/21/2017    Lorna Guzman    MRN: [id number]           Patient Information     Date Of Birth          1955        Visit Information        Provider Department      3/21/2017 1:10 PM Maira Khanna APRN CNP [facility]        Today's Diagnoses     Secondary cardiomyopathy (H)    -  1      Care Instructions    Thanks for coming into [facility] clinic today.    We discussed:   Call if you have any lightheadedness or dizziness with medication change, or if blood pressure is less than 90 on the top.     Medication changes:    INCREASE carvedilol to 25 mg twice a day (take 2 12.5mg tablets until you run out, new prescription at pharmacy- call when you need)    Results:   MRI showed EF is about the same as previous, now 45% and previous 43% on the left side.     Follow up:   Maira JAIMES in 2 weeks     MRI in 1 month  Dr. Spencer after next MRI     Please call my nurse Dana at [phone] with any questions or concerns.    Scheduling phone number: [phone]  Reminder: Please bring in all current medications, over the counter supplements and vitamin bottles to your next appointment.                Follow-ups after your visit        Additional Services     Follow-Up with Cardiac Advanced Practice Provider           Follow-Up with Cardiologist                 Future tests that were ordered for you today     Open Future Orders        Priority Expected Expires Ordered    Follow-Up with Cardiac Advanced Practice Provider Routine 4/4/2017 3/21/2019 3/21/2017    MRI Cardiac w/o contrast Routine 4/21/2017 3/21/2018 3/21/2017    Follow-Up with Cardiologist Routine 4/21/2017 3/21/2019 3/21/2017            Who to contact     If you have questions or need follow up information about today's clinic visit or your schedule please contact [facility] directly at  "[phone].  Normal or non-critical lab and imaging results will be communicated to you by [email or web address], letter or phone within 4 business days after the clinic has received the results. If you do not hear from us within 7 days, please contact the clinic through [email or web address] or phone. If you have a critical or abnormal lab result, we will notify you by phone as soon as possible.  Submit refill requests through [employer] or call your pharmacy and they will forward the refill request to us. Please allow 3 business days for your refill to be completed.          Additional Information About Your Visit        [email or web address] Information     [employer] gives you secure access to your electronic health record. If you see a primary care provider, you can also send messages to your care team and make appointments. If you have questions, please call your primary care clinic.  If you do not have a primary care provider, please call [phone] and they will assist you.        Care EveryWhere ID     This is your Care EveryWhere ID. This could be used by other organizations to access your [facility] medical records  [id number]        Your Vitals Were     Pulse Height BMI (Body Mass Index)             72 1.753 m (5' 9\") 21.15 kg/m2          Blood Pressure from Last 3 Encounters:   03/21/17 122/70   03/07/17 105/69   02/20/17 126/62    Weight from Last 3 Encounters:   03/21/17 65 kg (143 lb 3.2 oz)   03/07/17 65.6 kg (144 lb 11.2 oz)   02/20/17 66.5 kg (146 lb 11.2 oz)              We Performed the Following     Follow-Up with Cardiologist          Today's Medication Changes          These changes are accurate as of: 3/21/17  2:00 PM.  If you have any questions, ask your nurse or doctor.               These medicines have changed or have updated prescriptions.        Dose/Directions    carvedilol 25 MG tablet   Commonly known as:  COREG   This may have changed:    - medication strength  - how much to take   Used for:  Secondary cardiomyopathy (H) "   Changed by:  Maira Khanna APRN CNP        Dose:  25 mg   Take 1 tablet (25 mg) by mouth 2 times daily (with meals)   Refills:  0            Where to get your medicines      Some of these will need a paper prescription and others can be bought over the counter.  Ask your nurse if you have questions.     You don't need a prescription for these medications     carvedilol 25 MG tablet                Primary Care Provider Office Phone # Fax #    Max Moreno [phone]       [facility] [id number] [place] [id number]        Thank you!     Thank you for choosing [facility] PHYSICIANS HEART AT [place]  for your care. Our goal is always to provide you with excellent care. Hearing back from our patients is one way we can continue to improve our services. Please take a few minutes to complete the written survey that you may receive in the mail after your visit with us. Thank you!             Your Updated Medication List - Protect others around you: Learn how to safely use, store and throw away your medicines at www.disposemymeds.org.          This list is accurate as of: 3/21/17  2:00 PM.  Always use your most recent med list.                   Brand Name Dispense Instructions for use    carvedilol 25 MG tablet    COREG     Take 1 tablet (25 mg) by mouth 2 times daily (with meals)       cyclophosphamide 10 mg/mL Soln    CYTOXAN     Take by mouth daily Chemo agent       DOCETAXEL (NON-ALCOHOL) IV      Chemo agent       lisinopril 5 MG tablet    PRINIVIL/ZESTRIL    45 tablet    Take 0.5 tablets (2.5 mg) by mouth daily       LORazepam 0.5 MG tablet    ATIVAN     Take 0.5 mg by mouth every 6 hours as needed for anxiety       prochlorperazine 10 MG tablet    COMPAZINE     Take 10 mg by mouth every 6 hours as needed for nausea or vomiting       TRASTUZUMAB IV      Chemo agent

## 2017-03-21 NOTE — LETTER
3/21/2017    Max Moreno MD  [facility]   [address] [facility] [address] [facility] [address]    RE: Lorna A Thomas       Dear Colleague,    I had the pleasure of seeing Lorna Guzman in the [facility].    HPI and Plan:   See dictation    No orders of the defined types were placed in this encounter.      No orders of the defined types were placed in this encounter.      There are no discontinued medications.      Encounter Diagnosis   Name Primary?     Secondary cardiomyopathy (H) Yes       CURRENT MEDICATIONS:  Current Outpatient Prescriptions   Medication Sig Dispense Refill     lisinopril (PRINIVIL/ZESTRIL) 5 MG tablet Take 0.5 tablets (2.5 mg) by mouth daily 45 tablet 3     DOCETAXEL, NON-ALCOHOL, IV Chemo agent       cyclophosphamide (CYTOXAN) 10 mg/mL SOLN Take by mouth daily Chemo agent       TRASTUZUMAB IV Chemo agent       carvedilol (COREG) 12.5 MG tablet Take 1 tablet (12.5 mg) by mouth 2 times daily (with meals) 60 tablet 11     prochlorperazine (COMPAZINE) 10 MG tablet Take 10 mg by mouth every 6 hours as needed for nausea or vomiting       LORazepam (ATIVAN) 0.5 MG tablet Take 0.5 mg by mouth every 6 hours as needed for anxiety         ALLERGIES   No Known Allergies    PAST MEDICAL HISTORY:  Past Medical History   Diagnosis Date     Cancer (H)      breast       PAST SURGICAL HISTORY:  Past Surgical History   Procedure Laterality Date     Breast surgery       March 2006       FAMILY HISTORY:  Family History   Problem Relation Age of Onset     DIABETES Mother      Breast Cancer Mother      Ovarian Cancer Mother      Hypertension Father      Breast Cancer Sister        SOCIAL HISTORY:  Social History     Social History     Marital status:      Spouse name: N/A     Number of children: N/A     Years of education: N/A     Social History Main Topics     Smoking status: Never Smoker     Smokeless tobacco: None     Alcohol use No     Drug use: No      "Sexual activity: Yes     Partners: Male     Other Topics Concern     None     Social History Narrative       Review of Systems:  Skin:  Negative       Eyes:  Negative      ENT:  Negative      Respiratory:  Negative       Cardiovascular:  Negative      Gastroenterology: Negative      Genitourinary:  Negative      Musculoskeletal:  Negative      Neurologic:  Negative      Psychiatric:  Positive for anxiety (improving) pos breat cancer  Heme/Lymph/Imm:  Negative      Endocrine:  Negative        Physical Exam:  Vitals: /70  Pulse 72  Ht 1.753 m (5' 9\")  Wt 65 kg (143 lb 3.2 oz)  BMI 21.15 kg/m2    Constitutional:  cooperative;alert and oriented thin      Skin:  warm and dry to the touch        Head:  normocephalic        Eyes:  sclera white        ENT:  no pallor or cyanosis        Neck:  carotid pulses are full and equal bilaterally;JVP normal        Chest:  normal breath sounds, clear to auscultation, normal A-P diameter, normal symmetry, normal respiratory excursion, no use of accessory muscles          Cardiac: regular rhythm                  Abdomen:  abdomen soft        Vascular: pulses full and equal                                        Extremities and Back:  no edema;no deformities, clubbing, cyanosis, erythema observed              Neurological:  affect appropriate, oriented to time, person and place;no gross motor deficits              CC  Dr. Lindo   [facility]   [place], [place]                Cardiology Clinic Progress Note  Lorna Guzman MRN# [id number]   YOB: 1955 Age: 61 year old     Reason For Visit: Follow-up cardiomyopathy   Primary Cardiologist:   Dr. Spencer          History of Presenting Illness:    Lorna Guzman is a pleasant 61 year old patient with no past cardiac history. Past medical history significant for recurrent right breast carcinoma.  She has a history of right breast carcinoma diagnosed in 2006 and underwent lumpectomy and AC chemotherapy as well as " adjuvant radiation therapy.  She was also placed on adjuvant tamoxifen.  After two years of tamoxifen she transitioned to Aromasin and completed treatment in 2013 with no evidence of recurrent disease.  Unfortunately, in late 2016 she noticed a right breast lump and was diagnosed with invasive ductal carcinoma of the right breast.  Current plan is for neoadjuvant chemotherapy with taxane, cyclophosphamide, as well as Herceptin, prior to definitive mastectomy and axillary lymph node dissection.  As part of her chemotherapy workup she underwent echocardiogram which noted a reduced LV systolic function of 47% and she was referred to cardiology.     Pt saw Dr. Spencer on 2/6/2017 and he noted the most likely culprit for her cardiomyopathy was her prior history of Adriamycin therapy.  He initiated carvedilol and lisinopril. Note from 2/13/2017 shows that Dr. Spencer discussed cardiac MRI results with Dr. Lindo noting that with her malignancy being HER-2 positive it was reasonable to start Herceptin and recommended follow-up cardiac MRI to reassess her LVEF. Patient was seen by me on 2/20/2017. Her stress cardiac MRI from 2/10/2017 showed LVEF 43% with mild to moderate diffuse hypokinesis, RVEF 63%, mild biatrial enlargement, myxomatous mitral valve with bileaflet prolapse and mild MR, TR prolapse with mild TR, no evidence of myocardial infarction fibrosis or infiltrative disease, and no ischemia with stress.  Her baseline troponin was less than 0.015 and N-terminal proBNP was 136.  Her carvedilol was increased and a low-sodium diet with daily weights was discussed.     Patient was last seen by me on 3/7/2017 and her carvedilol was increased to 12.5 mg b.i.d.  Overall, she was doing well from a cardiac standpoint.     Pt presents today, with her daughter Federica, for follow-up cardiomyopathy. Unfortunately, she had a liver biopsy on 2/7/2017 which revealed metastatic carcinoma from her breast cancer. She has been taking increased  carvedilol 12.5 mg b.i.d. without any side effects.  Her blood pressure today in the clinic is 122/70 and heart rate is 72.  She denies any lightheadedness or dizziness.  She has not noted any increase in her weight. Overall, she has been doing well from a cardiac standpoint.  Her next Herceptin treatment will be on Thursday, 3/23/2017.  Patient reports no chest pain, shortness of breath, PND, orthopnea, presyncope, syncope, edema, heart racing, or palpitations.      Current Cardiac Medications   Carvedilol 12.5 mg b.i.d.  Lisinopril 2.5 mg daily                   Assessment and Plan:     Plan  1.  Increase carvedilol to 25 mg b.i.d.  2.  Call the clinic with any lightheadedness, dizziness, or if SBP is <90  3.  Check daily weights and call the clinic if your weight has increased more than 2 lbs in one day or 5 lbs in one week.  4.  2 Gm Na diet   5.  Cardiac MRI without contrast in 1 month, to reassess EF on Herceptin  6.  Follow-up with [facility] in 2 weeks to uptitrate CM medication   7.  Follow-up Dr. Spencer in May        1. Nonischemic Cardiomyopathy    Due to history of Adriamycin therapy    LVEF 45% on cardiac MRI 3/20/17 only improved 2%    No signs of heart failure    Increase beta blocker and continue lisinopril          Thank you for allowing me to participate in this delightful patient's care.      This note was completed in part using Dragon voice recognition software. Although reviewed after completion, some word and grammatical errors may occur.    TIFFANI Giron, CNP         Data:   All laboratory data reviewed    Thank you for allowing me to participate in the care of your patient.      Sincerely,     TIFFANI Giron CNP     [facility]

## 2017-03-21 NOTE — PATIENT INSTRUCTIONS
Thanks for coming into [facility] clinic today.    We discussed:   Call if you have any lightheadedness or dizziness with medication change, or if blood pressure is less than 90 on the top.     Medication changes:    INCREASE carvedilol to 25 mg twice a day (take 2 12.5mg tablets until you run out, new prescription at pharmacy- call when you need)    Results:   MRI showed EF is about the same as previous, now 45% and previous 43% on the left side.     Follow up:   Maira JAIMES in 2 weeks     MRI in 1 month  Dr. Spencer after next MRI     Please call my nurse Dana at [phone] with any questions or concerns.    Scheduling phone number: [phone]  Reminder: Please bring in all current medications, over the counter supplements and vitamin bottles to your next appointment.

## 2017-03-21 NOTE — PROGRESS NOTES
HPI and Plan:   See dictation    No orders of the defined types were placed in this encounter.      No orders of the defined types were placed in this encounter.      There are no discontinued medications.      Encounter Diagnosis   Name Primary?     Secondary cardiomyopathy (H) Yes       CURRENT MEDICATIONS:  Current Outpatient Prescriptions   Medication Sig Dispense Refill     lisinopril (PRINIVIL/ZESTRIL) 5 MG tablet Take 0.5 tablets (2.5 mg) by mouth daily 45 tablet 3     DOCETAXEL, NON-ALCOHOL, IV Chemo agent       cyclophosphamide (CYTOXAN) 10 mg/mL SOLN Take by mouth daily Chemo agent       TRASTUZUMAB IV Chemo agent       carvedilol (COREG) 12.5 MG tablet Take 1 tablet (12.5 mg) by mouth 2 times daily (with meals) 60 tablet 11     prochlorperazine (COMPAZINE) 10 MG tablet Take 10 mg by mouth every 6 hours as needed for nausea or vomiting       LORazepam (ATIVAN) 0.5 MG tablet Take 0.5 mg by mouth every 6 hours as needed for anxiety         ALLERGIES   No Known Allergies    PAST MEDICAL HISTORY:  Past Medical History   Diagnosis Date     Cancer (H)      breast       PAST SURGICAL HISTORY:  Past Surgical History   Procedure Laterality Date     Breast surgery       March 2006       FAMILY HISTORY:  Family History   Problem Relation Age of Onset     DIABETES Mother      Breast Cancer Mother      Ovarian Cancer Mother      Hypertension Father      Breast Cancer Sister        SOCIAL HISTORY:  Social History     Social History     Marital status:      Spouse name: N/A     Number of children: N/A     Years of education: N/A     Social History Main Topics     Smoking status: Never Smoker     Smokeless tobacco: None     Alcohol use No     Drug use: No     Sexual activity: Yes     Partners: Male     Other Topics Concern     None     Social History Narrative       Review of Systems:  Skin:  Negative       Eyes:  Negative      ENT:  Negative      Respiratory:  Negative       Cardiovascular:  Negative     "  Gastroenterology: Negative      Genitourinary:  Negative      Musculoskeletal:  Negative      Neurologic:  Negative      Psychiatric:  Positive for anxiety (improving) pos breat cancer  Heme/Lymph/Imm:  Negative      Endocrine:  Negative        Physical Exam:  Vitals: /70  Pulse 72  Ht 1.753 m (5' 9\")  Wt 65 kg (143 lb 3.2 oz)  BMI 21.15 kg/m2    Constitutional:  cooperative;alert and oriented thin      Skin:  warm and dry to the touch        Head:  normocephalic        Eyes:  sclera white        ENT:  no pallor or cyanosis        Neck:  carotid pulses are full and equal bilaterally;JVP normal        Chest:  normal breath sounds, clear to auscultation, normal A-P diameter, normal symmetry, normal respiratory excursion, no use of accessory muscles          Cardiac: regular rhythm                  Abdomen:  abdomen soft        Vascular: pulses full and equal                                        Extremities and Back:  no edema;no deformities, clubbing, cyanosis, erythema observed              Neurological:  affect appropriate, oriented to time, person and place;no gross motor deficits              CC  Dr. Lindo   [facility]   [place], [place]              "

## 2017-03-29 ENCOUNTER — MYC MEDICAL ADVICE (OUTPATIENT)
Dept: CARDIOLOGY | Facility: CLINIC | Age: 62
End: 2017-03-29

## 2017-03-29 DIAGNOSIS — I42.9 SECONDARY CARDIOMYOPATHY (H): ICD-10-CM

## 2017-03-30 DIAGNOSIS — I42.9 SECONDARY CARDIOMYOPATHY (H): ICD-10-CM

## 2017-03-30 RX ORDER — CARVEDILOL 25 MG/1
25 TABLET ORAL 2 TIMES DAILY WITH MEALS
Qty: 60 TABLET | Refills: 11 | Status: SHIPPED | OUTPATIENT
Start: 2017-03-30 | End: 2017-03-30

## 2017-03-30 RX ORDER — CARVEDILOL 25 MG/1
25 TABLET ORAL 2 TIMES DAILY WITH MEALS
Qty: 180 TABLET | Refills: 3 | Status: SHIPPED | OUTPATIENT
Start: 2017-03-30 | End: 2018-03-28

## 2017-04-04 ENCOUNTER — OFFICE VISIT (OUTPATIENT)
Dept: CARDIOLOGY | Facility: CLINIC | Age: 62
End: 2017-04-04
Attending: NURSE PRACTITIONER
Payer: COMMERCIAL

## 2017-04-04 VITALS
SYSTOLIC BLOOD PRESSURE: 122 MMHG | WEIGHT: 144.5 LBS | HEART RATE: 84 BPM | HEIGHT: 69 IN | BODY MASS INDEX: 21.4 KG/M2 | DIASTOLIC BLOOD PRESSURE: 70 MMHG

## 2017-04-04 DIAGNOSIS — I42.9 SECONDARY CARDIOMYOPATHY (H): Primary | ICD-10-CM

## 2017-04-04 PROCEDURE — 99213 OFFICE O/P EST LOW 20 MIN: CPT | Performed by: NURSE PRACTITIONER

## 2017-04-04 RX ORDER — LISINOPRIL 5 MG/1
5 TABLET ORAL DAILY
Qty: 90 TABLET | Refills: 3
Start: 2017-04-04 | End: 2018-03-28

## 2017-04-04 NOTE — PROGRESS NOTES
HPI and Plan:   See dictation    No orders of the defined types were placed in this encounter.      Orders Placed This Encounter   Medications     lisinopril (PRINIVIL/ZESTRIL) 5 MG tablet     Sig: Take 1 tablet (5 mg) by mouth daily     Dispense:  90 tablet     Refill:  3       Medications Discontinued During This Encounter   Medication Reason     lisinopril (PRINIVIL/ZESTRIL) 5 MG tablet Reorder         Encounter Diagnosis   Name Primary?     Secondary cardiomyopathy (H) Yes       CURRENT MEDICATIONS:  Current Outpatient Prescriptions   Medication Sig Dispense Refill     lisinopril (PRINIVIL/ZESTRIL) 5 MG tablet Take 1 tablet (5 mg) by mouth daily 90 tablet 3     carvedilol (COREG) 25 MG tablet Take 1 tablet (25 mg) by mouth 2 times daily (with meals) 180 tablet 3     DOCETAXEL, NON-ALCOHOL, IV Chemo agent       cyclophosphamide (CYTOXAN) 10 mg/mL SOLN Take by mouth daily Chemo agent       TRASTUZUMAB IV Chemo agent       prochlorperazine (COMPAZINE) 10 MG tablet Take 10 mg by mouth every 6 hours as needed for nausea or vomiting       LORazepam (ATIVAN) 0.5 MG tablet Take 0.5 mg by mouth every 6 hours as needed for anxiety       [DISCONTINUED] lisinopril (PRINIVIL/ZESTRIL) 5 MG tablet Take 0.5 tablets (2.5 mg) by mouth daily 45 tablet 3       ALLERGIES   No Known Allergies    PAST MEDICAL HISTORY:  Past Medical History:   Diagnosis Date     Cancer (H)     breast       PAST SURGICAL HISTORY:  Past Surgical History:   Procedure Laterality Date     BREAST SURGERY      March 2006       FAMILY HISTORY:  Family History   Problem Relation Age of Onset     DIABETES Mother      Breast Cancer Mother      Ovarian Cancer Mother      Hypertension Father      Breast Cancer Sister        SOCIAL HISTORY:  Social History     Social History     Marital status:      Spouse name: N/A     Number of children: N/A     Years of education: N/A     Social History Main Topics     Smoking status: Never Smoker     Smokeless tobacco:  "None     Alcohol use No     Drug use: No     Sexual activity: Yes     Partners: Male     Other Topics Concern     None     Social History Narrative       Review of Systems:  Skin:  Negative       Eyes:  Negative      ENT:  Negative      Respiratory:  Negative       Cardiovascular:  Negative      Gastroenterology: Negative      Genitourinary:  Negative      Musculoskeletal:  Negative      Neurologic:  Negative      Psychiatric:  Positive for anxiety (improving) pos breat cancer  Heme/Lymph/Imm:  Negative      Endocrine:  Negative        Physical Exam:  Vitals: /70  Pulse 84  Ht 1.753 m (5' 9\")  Wt 65.5 kg (144 lb 8 oz)  BMI 21.34 kg/m2    Constitutional:  cooperative;alert and oriented thin      Skin:  warm and dry to the touch        Head:  normocephalic        Eyes:  sclera white        ENT:  no pallor or cyanosis        Neck:  carotid pulses are full and equal bilaterally;JVP normal        Chest:  normal breath sounds, clear to auscultation, normal A-P diameter, normal symmetry, normal respiratory excursion, no use of accessory muscles          Cardiac: regular rhythm                  Abdomen:  abdomen soft        Vascular: pulses full and equal                                        Extremities and Back:  no edema;no deformities, clubbing, cyanosis, erythema observed              Neurological:  affect appropriate, oriented to time, person and place;no gross motor deficits              CC  TIFFANI Kellogg CNP   [facility]  [address]  [place] [address]              "

## 2017-04-04 NOTE — MR AVS SNAPSHOT
After Visit Summary   4/4/2017    Lorna Guzman    MRN: [id number]           Patient Information     Date Of Birth          1955        Visit Information        Provider Department      4/4/2017 1:10 PM Maira Khanna APRN CNP [facility] [place]        Today's Diagnoses     Secondary cardiomyopathy (H)    -  1      Care Instructions    Thanks for coming into [facility] today.    We discussed:   Call if any lightheadedness or dizziness, or blood pressure less than 100 with symptoms.     Medication changes:    INCREASE lisinopril to 5 mg daily       Follow up:   MRI 4/21  Dr. Spencer 5/8      Please call my nurse Dana at [phone] with any questions or concerns.    Scheduling phone number: [phone]  Reminder: Please bring in all current medications, over the counter supplements and vitamin bottles to your next appointment.                Follow-ups after your visit        Your next 10 appointments already scheduled     Apr 21, 2017 10:00 AM CDT   MR MYOCARDIUM W/O CONTRAST with SCIMR1   [facility] (Cardiovascular Imaging at [facility])    [address] [place] [address]  [address] [address] [phone]           Take your medicines as usual, unless your doctor tells you not to. Bring a list of your current medicines to your exam (including vitamins, minerals and over-the-counter drugs). Also bring the results of similar scans you may have had.  Please remove any body piercings and hair extensions before you arrive.  Follow your doctor s orders. If you do not, we may have to postpone your exam.  You will not have contrast for this exam. You do not need to do anything special to prepare.  The MRI machine uses a strong magnet. Please wear clothes without metal (snaps, zippers). A sweatsuit works well, or we may give you a hospital gown.   **IMPORTANT** THE INSTRUCTIONS BELOW ARE  ONLY FOR THOSE PATIENTS WHO HAVE BEEN TOLD THEY WILL RECEIVE SEDATION OR GENERAL ANESTHESIA DURING THEIR MRI PROCEDURE:  IF YOU WILL RECEIVE SEDATION (take medicine to help you relax during your exam):   You must get the medicine from your doctor before you arrive. Bring the medicine to the exam. Do not take it at home.   Arrive one hour early. Bring someone who can take you home after the test. Your medicine will make you sleepy. After the exam, you may not drive, take a bus or take a taxi by yourself.   No eating 8 hours before your exam. You may have clear liquids up until 4 hours before your exam. (Clear liquids include water, clear tea, black coffee and fruit juice without pulp.)  IF YOU WILL RECEIVE ANESTHESIA (be asleep for your exam):   Arrive 1 1/2 hours early. Bring someone who can take you home after the test. You may not drive, take a bus or take a taxi by yourself.   No eating 8 hours before your exam. You may have clear liquids up until 4 hours before your exam. (Clear liquids include water, clear tea, black coffee and fruit juice without pulp.)   You will spend four to five hours in the recovery room.  Please call the Imaging Department at your exam site with any questions.            May 08, 2017  8:30 AM CDT   Return Visit with Elias Spencer MD   [facility] ([facility])    [address] [place] [id number] [phone]              Who to contact     If you have questions or need follow up information about today's clinic visit or your schedule please contact [facility] directly at [phone].  Normal or non-critical lab and imaging results will be communicated to you by MyChart, letter or phone within 4 business days after the clinic has received the results. If you do not hear from us within 7 days, please contact the clinic through MyChart or phone. If you have a critical or  "abnormal lab result, we will notify you by phone as soon as possible.  Submit refill requests through Personally or call your pharmacy and they will forward the refill request to us. Please allow 3 business days for your refill to be completed.          Additional Information About Your Visit        [facility] Information     Personally gives you secure access to your electronic health record. If you see a primary care provider, you can also send messages to your care team and make appointments. If you have questions, please call your primary care clinic.  If you do not have a primary care provider, please call [phone] and they will assist you.        Care EveryWhere ID     This is your Care EveryWhere ID. This could be used by other organizations to access your [facility] medical records  [id number]        Your Vitals Were     Pulse Height BMI (Body Mass Index)             84 1.753 m (5' 9\") 21.34 kg/m2          Blood Pressure from Last 3 Encounters:   04/04/17 122/70   03/21/17 122/70   03/07/17 105/69    Weight from Last 3 Encounters:   04/04/17 65.5 kg (144 lb 8 oz)   03/21/17 65 kg (143 lb 3.2 oz)   03/07/17 65.6 kg (144 lb 11.2 oz)              We Performed the Following     Follow-Up with Cardiac Advanced Practice Provider          Today's Medication Changes          These changes are accurate as of: 4/4/17  1:28 PM.  If you have any questions, ask your nurse or doctor.               These medicines have changed or have updated prescriptions.        Dose/Directions    lisinopril 5 MG tablet   Commonly known as:  PRINIVIL/ZESTRIL   This may have changed:  how much to take   Used for:  Secondary cardiomyopathy (H)   Changed by:  Maira Khanna APRN CNP        Dose:  5 mg   Take 1 tablet (5 mg) by mouth daily   Quantity:  90 tablet   Refills:  3            Where to get your medicines      Some of these will need a paper prescription and others can be bought over the counter.  Ask your nurse if you have " questions.     You don't need a prescription for these medications     lisinopril 5 MG tablet                Primary Care Provider Office Phone # Fax #    Max Moreno [phone]       [facility] [id number] [place] [id number]        Thank you!     Thank you for choosing [facility] [place]  for your care. Our goal is always to provide you with excellent care. Hearing back from our patients is one way we can continue to improve our services. Please take a few minutes to complete the written survey that you may receive in the mail after your visit with us. Thank you!             Your Updated Medication List - Protect others around you: Learn how to safely use, store and throw away your medicines at www.disposemymeds.org.          This list is accurate as of: 4/4/17  1:28 PM.  Always use your most recent med list.                   Brand Name Dispense Instructions for use    carvedilol 25 MG tablet    COREG    180 tablet    Take 1 tablet (25 mg) by mouth 2 times daily (with meals)       cyclophosphamide 10 mg/mL Soln    CYTOXAN     Take by mouth daily Chemo agent       DOCETAXEL (NON-ALCOHOL) IV      Chemo agent       lisinopril 5 MG tablet    PRINIVIL/ZESTRIL    90 tablet    Take 1 tablet (5 mg) by mouth daily       LORazepam 0.5 MG tablet    ATIVAN     Take 0.5 mg by mouth every 6 hours as needed for anxiety       prochlorperazine 10 MG tablet    COMPAZINE     Take 10 mg by mouth every 6 hours as needed for nausea or vomiting       TRASTUZUMAB IV      Chemo agent

## 2017-04-04 NOTE — LETTER
4/4/2017    Max Moreno MD  [facility]   [address] [facility] [address]    RE: Lorna Guzman       Dear Colleague,    I had the pleasure of seeing Lorna Guzman in the [facility].    HPI and Plan:   See dictation    No orders of the defined types were placed in this encounter.      Orders Placed This Encounter   Medications     lisinopril (PRINIVIL/ZESTRIL) 5 MG tablet     Sig: Take 1 tablet (5 mg) by mouth daily     Dispense:  90 tablet     Refill:  3       Medications Discontinued During This Encounter   Medication Reason     lisinopril (PRINIVIL/ZESTRIL) 5 MG tablet Reorder         Encounter Diagnosis   Name Primary?     Secondary cardiomyopathy (H) Yes       CURRENT MEDICATIONS:  Current Outpatient Prescriptions   Medication Sig Dispense Refill     lisinopril (PRINIVIL/ZESTRIL) 5 MG tablet Take 1 tablet (5 mg) by mouth daily 90 tablet 3     carvedilol (COREG) 25 MG tablet Take 1 tablet (25 mg) by mouth 2 times daily (with meals) 180 tablet 3     DOCETAXEL, NON-ALCOHOL, IV Chemo agent       cyclophosphamide (CYTOXAN) 10 mg/mL SOLN Take by mouth daily Chemo agent       TRASTUZUMAB IV Chemo agent       prochlorperazine (COMPAZINE) 10 MG tablet Take 10 mg by mouth every 6 hours as needed for nausea or vomiting       LORazepam (ATIVAN) 0.5 MG tablet Take 0.5 mg by mouth every 6 hours as needed for anxiety       [DISCONTINUED] lisinopril (PRINIVIL/ZESTRIL) 5 MG tablet Take 0.5 tablets (2.5 mg) by mouth daily 45 tablet 3       ALLERGIES   No Known Allergies    PAST MEDICAL HISTORY:  Past Medical History:   Diagnosis Date     Cancer (H)     breast       PAST SURGICAL HISTORY:  Past Surgical History:   Procedure Laterality Date     BREAST SURGERY      March 2006       FAMILY HISTORY:  Family History   Problem Relation Age of Onset     DIABETES Mother      Breast Cancer Mother      Ovarian Cancer Mother      Hypertension Father      Breast Cancer  "Sister        SOCIAL HISTORY:  Social History     Social History     Marital status:      Spouse name: N/A     Number of children: N/A     Years of education: N/A     Social History Main Topics     Smoking status: Never Smoker     Smokeless tobacco: None     Alcohol use No     Drug use: No     Sexual activity: Yes     Partners: Male     Other Topics Concern     None     Social History Narrative       Review of Systems:  Skin:  Negative       Eyes:  Negative      ENT:  Negative      Respiratory:  Negative       Cardiovascular:  Negative      Gastroenterology: Negative      Genitourinary:  Negative      Musculoskeletal:  Negative      Neurologic:  Negative      Psychiatric:  Positive for anxiety (improving) pos breat cancer  Heme/Lymph/Imm:  Negative      Endocrine:  Negative        Physical Exam:  Vitals: /70  Pulse 84  Ht 1.753 m (5' 9\")  Wt 65.5 kg (144 lb 8 oz)  BMI 21.34 kg/m2    Constitutional:  cooperative;alert and oriented thin      Skin:  warm and dry to the touch        Head:  normocephalic        Eyes:  sclera white        ENT:  no pallor or cyanosis        Neck:  carotid pulses are full and equal bilaterally;JVP normal        Chest:  normal breath sounds, clear to auscultation, normal A-P diameter, normal symmetry, normal respiratory excursion, no use of accessory muscles          Cardiac: regular rhythm                  Abdomen:  abdomen soft        Vascular: pulses full and equal                                        Extremities and Back:  no edema;no deformities, clubbing, cyanosis, erythema observed              Neurological:  affect appropriate, oriented to time, person and place;no gross motor deficits              TIFFANI Lee CNP   [facility]  [address]  [place] [address]                Cardiology Clinic Progress Note  Lorna Guzman MRN# [id number]   YOB: 1955 Age: 61 year old     Reason For Visit: Follow-up " cardiomyopathy   Primary Cardiologist:   Dr. Spencer          History of Presenting Illness:    Lorna Guzman is a pleasant 61 year old patient with no past cardiac history. Past medical history significant for recurrent right breast carcinoma.  She has a history of right breast carcinoma diagnosed in 2006 and underwent lumpectomy and AC chemotherapy as well as adjuvant radiation therapy.  She was also placed on adjuvant tamoxifen.  After two years of tamoxifen she transitioned to Aromasin and completed treatment in 2013 with no evidence of recurrent disease.  Unfortunately, in late 2016 she noticed a right breast lump and was diagnosed with invasive ductal carcinoma of the right breast.  Plan was for neoadjuvant chemotherapy with taxane, cyclophosphamide, as well as Herceptin, prior to definitive mastectomy and axillary lymph node dissection.  As part of her chemotherapy workup she underwent echocardiogram which noted a reduced LV systolic function of 47% and she was referred to cardiology.     Pt saw Dr. Spencer 2/6/2017 and he noted the most likely culprit for her cardiomyopathy was her prior history of Adriamycin therapy.  He initiated carvedilol and lisinopril. Note from 2/13/2017 shows that Dr. Spnecer discussed cardiac MRI results with Dr. Lindo noting that with her malignancy being HER-2 positive it was reasonable to start Herceptin. Stress cardiac MRI  2/10/2017 showed LVEF 43% with mild to moderate diffuse hypokinesis, RVEF 63%, mild biatrial enlargement, myxomatous mitral valve with bileaflet prolapse and mild MR, TR prolapse with mild TR, no evidence of myocardial infarction fibrosis or infiltrative disease, and no ischemia with stress.  Her baseline troponin was less than 0.015 and N-terminal proBNP was 136. Unfortunately, she had a liver biopsy on 2/7/2017 which revealed metastatic carcinoma from her breast cancer. Pt has been seen by me over the past month for up titration of her cardiomyopathy medications.  She has been doing well with her chemotherapy treatments.     Pt presents today, with her daughter Federica, for follow-up cardiomyopathy. She has increased her carvedilol to 25 mg b.i.d. without any side effects.  Her blood pressure today in the clinic is 122/70 and heart rate is 84.  She denies any lightheadedness or dizziness. Her home blood pressures have not been less than 100 systolic.  She has not noted any increase in her weight. Overall, she has been doing well from a cardiac standpoint.  Her next Herceptin treatment will be on 4/13/2017.  Patient reports no chest pain, shortness of breath, PND, orthopnea, presyncope, syncope, edema, heart racing, or palpitations.      Current Cardiac Medications   Carvedilol 25 mg b.i.d.  Lisinopril 2.5 mg daily                   Assessment and Plan:     Plan  1.  Increase lisinopril to 5 mg daily  2.  Call the clinic with any lightheadedness, dizziness, or blood pressures less than 90 systolically  3.  BMP in 1-2 weeks after increasing lisinopril (we will call [place] [facility] after 4/13 to get these results)  4.  Cardiac MRI without contrast 4/21, to reassess EF on Herceptin  5.  Follow-up with Dr. Spencer 5/8      1. Nonischemic Cardiomyopathy    Due to history of Adriamycin therapy    LVEF 45% on cardiac MRI 3/20/17 only improved 2%    No signs of heart failure    Increase lisinopril and continue carvedilol         Thank you for allowing me to participate in this delightful patient's care.      This note was completed in part using Dragon voice recognition software. Although reviewed after completion, some word and grammatical errors may occur.    TIFFANI Giron, CNP         Data:   All laboratory data reviewed    Thank you for allowing me to participate in the care of your patient.    Sincerely,     TIFFANI Giron CNP     [facility]

## 2017-04-04 NOTE — PATIENT INSTRUCTIONS
Thanks for coming into [facility] clinic today.    We discussed:   Call if any lightheadedness or dizziness, or blood pressure less than 100 with symptoms.     Medication changes:    INCREASE lisinopril to 5 mg daily       Follow up:   MRI 4/21  Dr. Spencer 5/8      Please call my nurse Dana at [phone] with any questions or concerns.    Scheduling phone number: [phone]  Reminder: Please bring in all current medications, over the counter supplements and vitamin bottles to your next appointment.

## 2017-04-04 NOTE — PROGRESS NOTES
Cardiology Clinic Progress Note  Lorna Guzman MRN# [id number]   YOB: 1955 Age: 61 year old     Reason For Visit: Follow-up cardiomyopathy   Primary Cardiologist:   Dr. Spencer          History of Presenting Illness:    Lorna Guzman is a pleasant 61 year old patient with no past cardiac history. Past medical history significant for recurrent right breast carcinoma.  She has a history of right breast carcinoma diagnosed in 2006 and underwent lumpectomy and AC chemotherapy as well as adjuvant radiation therapy.  She was also placed on adjuvant tamoxifen.  After two years of tamoxifen she transitioned to Aromasin and completed treatment in 2013 with no evidence of recurrent disease.  Unfortunately, in late 2016 she noticed a right breast lump and was diagnosed with invasive ductal carcinoma of the right breast.  Plan was for neoadjuvant chemotherapy with taxane, cyclophosphamide, as well as Herceptin, prior to definitive mastectomy and axillary lymph node dissection.  As part of her chemotherapy workup she underwent echocardiogram which noted a reduced LV systolic function of 47% and she was referred to cardiology.     Pt saw Dr. Spencer 2/6/2017 and he noted the most likely culprit for her cardiomyopathy was her prior history of Adriamycin therapy.  He initiated carvedilol and lisinopril. Note from 2/13/2017 shows that Dr. Spencer discussed cardiac MRI results with Dr. Lindo noting that with her malignancy being HER-2 positive it was reasonable to start Herceptin. Stress cardiac MRI  2/10/2017 showed LVEF 43% with mild to moderate diffuse hypokinesis, RVEF 63%, mild biatrial enlargement, myxomatous mitral valve with bileaflet prolapse and mild MR, TR prolapse with mild TR, no evidence of myocardial infarction fibrosis or infiltrative disease, and no ischemia with stress.  Her baseline troponin was less than 0.015 and N-terminal proBNP was 136. Unfortunately, she had a liver biopsy on 2/7/2017 which  revealed metastatic carcinoma from her breast cancer. Pt has been seen by me over the past month for up titration of her cardiomyopathy medications. She has been doing well with her chemotherapy treatments.     Pt presents today, with her daughter Federica, for follow-up cardiomyopathy. She has increased her carvedilol to 25 mg b.i.d. without any side effects.  Her blood pressure today in the clinic is 122/70 and heart rate is 84.  She denies any lightheadedness or dizziness. Her home blood pressures have not been less than 100 systolic.  She has not noted any increase in her weight. Overall, she has been doing well from a cardiac standpoint.  Her next Herceptin treatment will be on 4/13/2017.  Patient reports no chest pain, shortness of breath, PND, orthopnea, presyncope, syncope, edema, heart racing, or palpitations.      Current Cardiac Medications   Carvedilol 25 mg b.i.d.  Lisinopril 2.5 mg daily                   Assessment and Plan:     Plan  1.  Increase lisinopril to 5 mg daily  2.  Call the clinic with any lightheadedness, dizziness, or blood pressures less than 90 systolically  3.  BMP in 1-2 weeks after increasing lisinopril (we will call [place] [facility] after 4/13 to get these results)  4.  Cardiac MRI without contrast 4/21, to reassess EF on Herceptin  5.  Follow-up with Dr. Spencer 5/8      1. Nonischemic Cardiomyopathy    Due to history of Adriamycin therapy    LVEF 45% on cardiac MRI 3/20/17 only improved 2%    No signs of heart failure    Increase lisinopril and continue carvedilol         Thank you for allowing me to participate in this delightful patient's care.      This note was completed in part using Dragon voice recognition software. Although reviewed after completion, some word and grammatical errors may occur.    Maira Khanna, APRN, CNP           Data:   All laboratory data reviewed

## 2017-04-13 ENCOUNTER — TRANSFERRED RECORDS (OUTPATIENT)
Dept: SURGERY | Facility: CLINIC | Age: 62
End: 2017-04-13

## 2017-04-13 ENCOUNTER — PRE VISIT (OUTPATIENT)
Dept: CARDIOLOGY | Facility: CLINIC | Age: 62
End: 2017-04-13

## 2017-04-13 ENCOUNTER — TRANSFERRED RECORDS (OUTPATIENT)
Dept: CARDIOLOGY | Facility: CLINIC | Age: 62
End: 2017-04-13

## 2017-04-13 PROBLEM — I42.9 CARDIOMYOPATHY, IDIOPATHIC (H): Status: ACTIVE | Noted: 2017-04-13

## 2017-04-13 PROBLEM — C50.919 BREAST CANCER (H): Status: ACTIVE | Noted: 2017-04-13

## 2017-04-13 LAB
ALBUMIN SERPL-MCNC: 4.2 G/DL
ALP SERPL-CCNC: 35 U/L
ALT SERPL-CCNC: 12 U/L
ANION GAP SERPL CALCULATED.3IONS-SCNC: NORMAL MMOL/L
AST SERPL-CCNC: 14 U/L
BILIRUB SERPL-MCNC: 0.5 MG/DL
BUN SERPL-MCNC: 16 MG/DL
CALCIUM SERPL-MCNC: 9.3 MG/DL
CHLORIDE SERPLBLD-SCNC: 107 MMOL/L
CO2 SERPL-SCNC: 27 MMOL/L
CREAT SERPL-MCNC: 0.81 MG/DL
ERYTHROCYTE [DISTWIDTH] IN BLOOD BY AUTOMATED COUNT: NORMAL %
GFR SERPL CREATININE-BSD FRML MDRD: NORMAL ML/MIN/1.73M2
GLUCOSE SERPL-MCNC: 98 MG/DL (ref 70–99)
HCT VFR BLD AUTO: 34.8 %
HEMOGLOBIN: 11.5 G/DL
MCH RBC QN AUTO: NORMAL PG
MCHC RBC AUTO-ENTMCNC: NORMAL G/DL
MCV RBC AUTO: NORMAL FL
PLATELET # BLD AUTO: 227 10^9/L
POTASSIUM SERPL-SCNC: 4.7 MMOL/L
PROT SERPL-MCNC: 6.5 G/DL
RBC # BLD AUTO: 3.69 10^12/L
SODIUM SERPL-SCNC: 141 MMOL/L
WBC # BLD AUTO: 5.3 10^9/L

## 2017-04-14 LAB
ALBUMIN SERPL-MCNC: 4.2 G/DL
ALBUMIN SERPL-MCNC: 4.2 G/DL
ALP SERPL-CCNC: 35 U/L
ALP SERPL-CCNC: 35 U/L
ALT SERPL-CCNC: 12 U/L
ALT SERPL-CCNC: 12 U/L
ANION GAP SERPL CALCULATED.3IONS-SCNC: NORMAL MMOL/L
ANION GAP SERPL CALCULATED.3IONS-SCNC: NORMAL MMOL/L
AST SERPL-CCNC: 14 U/L
AST SERPL-CCNC: 14 U/L
BILIRUB SERPL-MCNC: 0.5 MG/DL
BILIRUB SERPL-MCNC: NORMAL MG/DL
BUN SERPL-MCNC: 16 MG/DL
BUN SERPL-MCNC: 16 MG/DL
CALCIUM SERPL-MCNC: 9.3 MG/DL
CALCIUM SERPL-MCNC: NORMAL MG/DL
CHLORIDE SERPLBLD-SCNC: 107 MMOL/L
CHLORIDE SERPLBLD-SCNC: NORMAL MMOL/L
CO2 SERPL-SCNC: 27 MMOL/L
CO2 SERPL-SCNC: NORMAL MMOL/L
CREAT SERPL-MCNC: 0.81 MG/DL
CREAT SERPL-MCNC: NORMAL MG/DL
GFR SERPL CREATININE-BSD FRML MDRD: >60 ML/MIN/1.73M2
GFR SERPL CREATININE-BSD FRML MDRD: NORMAL ML/MIN/1.73M2
GLUCOSE SERPL-MCNC: 98 MG/DL (ref 70–99)
GLUCOSE SERPL-MCNC: NORMAL MG/DL (ref 70–99)
POTASSIUM SERPL-SCNC: 4.7 MMOL/L
POTASSIUM SERPL-SCNC: NORMAL MMOL/L
PROT SERPL-MCNC: 6.5 G/DL
PROT SERPL-MCNC: NORMAL G/DL
SODIUM SERPL-SCNC: 141 MMOL/L
SODIUM SERPL-SCNC: NORMAL MMOL/L

## 2017-04-18 ENCOUNTER — DOCUMENTATION ONLY (OUTPATIENT)
Dept: CARDIOLOGY | Facility: CLINIC | Age: 62
End: 2017-04-18

## 2017-04-18 ENCOUNTER — TELEPHONE (OUTPATIENT)
Dept: CARDIOLOGY | Facility: CLINIC | Age: 62
End: 2017-04-18

## 2017-04-18 NOTE — TELEPHONE ENCOUNTER
Please call Pt to let her know that her lab results are normal after increasing lisinopril.     BMP 4/13/2017 sodium 141 potassium 4.7 BUN 16 creatinine 0.81 and GFR greater than 60.    TIFFANI Giron, CNP

## 2017-04-21 ENCOUNTER — HOSPITAL ENCOUNTER (OUTPATIENT)
Dept: CARDIOLOGY | Facility: CLINIC | Age: 62
Discharge: HOME | End: 2017-04-21
Attending: NURSE PRACTITIONER | Admitting: NURSE PRACTITIONER
Payer: COMMERCIAL

## 2017-04-21 DIAGNOSIS — I42.9 SECONDARY CARDIOMYOPATHY (H): ICD-10-CM

## 2017-04-21 PROCEDURE — 75557 CARDIAC MRI FOR MORPH: CPT

## 2017-04-21 PROCEDURE — 75557 CARDIAC MRI FOR MORPH: CPT | Mod: 26 | Performed by: INTERNAL MEDICINE

## 2017-04-21 RX ORDER — METHYLPREDNISOLONE SODIUM SUCCINATE 125 MG/2ML
125 INJECTION, POWDER, LYOPHILIZED, FOR SOLUTION INTRAMUSCULAR; INTRAVENOUS
Status: DISCONTINUED | OUTPATIENT
Start: 2017-04-21 | End: 2017-04-22 | Stop reason: HOSPADM

## 2017-04-21 RX ORDER — ONDANSETRON 2 MG/ML
4 INJECTION INTRAMUSCULAR; INTRAVENOUS
Status: DISCONTINUED | OUTPATIENT
Start: 2017-04-21 | End: 2017-04-22 | Stop reason: HOSPADM

## 2017-04-21 RX ORDER — DIPHENHYDRAMINE HCL 25 MG
25 CAPSULE ORAL
Status: DISCONTINUED | OUTPATIENT
Start: 2017-04-21 | End: 2017-04-22 | Stop reason: HOSPADM

## 2017-04-21 RX ORDER — DIPHENHYDRAMINE HYDROCHLORIDE 50 MG/ML
25-50 INJECTION INTRAMUSCULAR; INTRAVENOUS
Status: DISCONTINUED | OUTPATIENT
Start: 2017-04-21 | End: 2017-04-22 | Stop reason: HOSPADM

## 2017-04-21 RX ORDER — DIAZEPAM 5 MG
5 TABLET ORAL EVERY 30 MIN PRN
Status: DISCONTINUED | OUTPATIENT
Start: 2017-04-21 | End: 2017-04-22 | Stop reason: HOSPADM

## 2017-04-21 RX ORDER — ACYCLOVIR 200 MG/1
0-1 CAPSULE ORAL
Status: DISCONTINUED | OUTPATIENT
Start: 2017-04-21 | End: 2017-04-22 | Stop reason: HOSPADM

## 2017-05-01 DIAGNOSIS — I42.9 CARDIOMYOPATHY, IDIOPATHIC (H): Primary | ICD-10-CM

## 2017-05-04 ENCOUNTER — TRANSFERRED RECORDS (OUTPATIENT)
Dept: SURGERY | Facility: CLINIC | Age: 62
End: 2017-05-04

## 2017-05-08 ENCOUNTER — OFFICE VISIT (OUTPATIENT)
Dept: CARDIOLOGY | Facility: CLINIC | Age: 62
End: 2017-05-08
Attending: NURSE PRACTITIONER
Payer: COMMERCIAL

## 2017-05-08 VITALS
HEART RATE: 72 BPM | BODY MASS INDEX: 20.73 KG/M2 | WEIGHT: 140 LBS | DIASTOLIC BLOOD PRESSURE: 72 MMHG | SYSTOLIC BLOOD PRESSURE: 118 MMHG | HEIGHT: 69 IN

## 2017-05-08 DIAGNOSIS — I42.9 SECONDARY CARDIOMYOPATHY (H): ICD-10-CM

## 2017-05-08 PROCEDURE — 99213 OFFICE O/P EST LOW 20 MIN: CPT | Performed by: INTERNAL MEDICINE

## 2017-05-08 NOTE — MR AVS SNAPSHOT
After Visit Summary   5/8/2017    Lorna Guzman    MRN: [id number]           Patient Information     Date Of Birth          1955        Visit Information        Provider Department      5/8/2017 8:30 AM Elias Spencer MD [facility] [place]        Today's Diagnoses     Secondary cardiomyopathy (H)           Follow-ups after your visit        Additional Services     Follow-Up with Cardiac Advanced Practice Provider                 Your next 10 appointments already scheduled     Jun 30, 2017 10:00 AM CDT   MR MYOCARDIUM W CONTRAST with SCIMR1   [facility] (Cardiovascular Imaging at [facility])    [address]  [address]  [facility] [address] [phone]           Take your medicines as usual, unless your doctor tells you not to. Bring a list of your current medicines to your exam (including vitamins, minerals and over-the-counter drugs).  You will be given intravenous contrast for this exam. To prepare:   The day before your exam, drink extra fluids at least six 8-ounce glasses (unless your doctor tells you to restrict your fluids).   Have a blood test (creatinine test) within 30 days of your exam. Go to your clinic or Diagnostic Imaging Department for this test.  The MRI machine uses a strong magnet. Please wear clothes without metal (snaps, zippers). A sweatsuit works well, or we may give you a hospital gown.  Please remove any body piercings and hair extensions before you arrive. You will also remove watches, jewelry, hairpins, wallets, dentures, partial dental plates and hearing aids. You may wear contact lenses, and you may be able to wear your rings. We have a safe place to keep your personal items, but it is safer to leave them at home.   **IMPORTANT** THE INSTRUCTIONS BELOW ARE ONLY FOR THOSE PATIENTS WHO HAVE BEEN TOLD THEY WILL RECEIVE SEDATION OR GENERAL ANESTHESIA DURING THEIR MRI PROCEDURE:  IF  YOU WILL RECEIVE SEDATION (take medicine to help you relax during your exam):   You must get the medicine from your doctor before you arrive. Bring the medicine to the exam. Do not take it at home.   Arrive one hour early. Bring someone who can take you home after the test. Your medicine will make you sleepy. After the exam, you may not drive, take a bus or take a taxi by yourself.   No eating 8 hours before your exam. You may have clear liquids up until 4 hours before your exam. (Clear liquids include water, clear tea, black coffee and fruit juice without pulp.)  IF YOU WILL RECEIVE ANESTHESIA (be asleep for your exam):   Arrive 1 1/2 hours early. Bring someone who can take you home after the test. You may not drive, take a bus or take a taxi by yourself.   No eating 8 hours before your exam. You may have clear liquids up until 4 hours before your exam. (Clear liquids include water, clear tea, black coffee and fruit juice without pulp.)  Please call the Imaging Department at your exam site with any questions.              Future tests that were ordered for you today     Open Future Orders        Priority Expected Expires Ordered    Follow-Up with Cardiac Advanced Practice Provider Routine 8/6/2017 5/8/2018 5/8/2017    MRI Cardiac w/contrast Routine 6/28/2017 5/8/2018 5/8/2017            Who to contact     If you have questions or need follow up information about today's clinic visit or your schedule please contact [facility] AT [place] directly at [phone].  Normal or non-critical lab and imaging results will be communicated to you by MyChart, letter or phone within 4 business days after the clinic has received the results. If you do not hear from us within 7 days, please contact the clinic through [employer]ell[employer] or phone. If you have a critical or abnormal lab result, we will notify you by phone as soon as possible.  Submit refill requests through [employer] or call your pharmacy and they  "will forward the refill request to us. Please allow 3 business days for your refill to be completed.          Additional Information About Your Visit        [employer] Information     [employer] gives you secure access to your electronic health record. If you see a primary care provider, you can also send messages to your care team and make appointments. If you have questions, please call your primary care clinic.  If you do not have a primary care provider, please call [phone] and they will assist you.        Care EveryWhere ID     This is your Care EveryWhere ID. This could be used by other organizations to access your [place] medical records  [id number]        Your Vitals Were     Pulse Height BMI (Body Mass Index)             72 1.753 m (5' 9\") 20.67 kg/m2          Blood Pressure from Last 3 Encounters:   05/08/17 118/72   04/04/17 122/70   03/21/17 122/70    Weight from Last 3 Encounters:   05/08/17 63.5 kg (140 lb)   04/04/17 65.5 kg (144 lb 8 oz)   03/21/17 65 kg (143 lb 3.2 oz)              We Performed the Following     Follow-Up with Cardiologist        Primary Care Provider Office Phone # Fax #    Max Moreno [phone]       [facility] [id number] [place] [id number]        Thank you!     Thank you for choosing [facility] AT [place]  for your care. Our goal is always to provide you with excellent care. Hearing back from our patients is one way we can continue to improve our services. Please take a few minutes to complete the written survey that you may receive in the mail after your visit with us. Thank you!             Your Updated Medication List - Protect others around you: Learn how to safely use, store and throw away your medicines at www.disposemymeds.org.          This list is accurate as of: 5/8/17  8:59 AM.  Always use your most recent med list.                   Brand Name Dispense Instructions for use    " carvedilol 25 MG tablet    COREG    180 tablet    Take 1 tablet (25 mg) by mouth 2 times daily (with meals)       lisinopril 5 MG tablet    PRINIVIL/ZESTRIL    90 tablet    Take 1 tablet (5 mg) by mouth daily       LORazepam 0.5 MG tablet    ATIVAN     Take 0.5 mg by mouth every 6 hours as needed for anxiety       prochlorperazine 10 MG tablet    COMPAZINE     Take 10 mg by mouth every 6 hours as needed for nausea or vomiting       TRASTUZUMAB IV      Chemo agent

## 2017-05-08 NOTE — PROGRESS NOTES
HISTORY OF PRESENT ILLNESS:  I had pleasure to see Ms. Guzman in followup at [facility].  The patient is a very pleasant 61-year-old female who I saw last in 02/2017.  At that time, she presented to me with invasive ductal carcinoma of the right breast which was a recurrence, having previously undergone lumpectomy and chemotherapy in 2006.  An echocardiogram at that time demonstrated mildly reduced left ventricular systolic function and cardiac consultation was advised.  She was completely asymptomatic from a cardiac standpoint, and subsequently we have initiated medical therapy for her cardiomyopathy and performed serial cardiac MRIs which have demonstrated stable, mildly reduced left ventricular systolic function.  Of note, she did undergo stress perfusion imaging as part of her first cardiac MRI, which was negative for ischemia.      She subsequently had been following with Maira Khanna, one of my nurse practitioners, and her cardioprotective medications have been up titrated.  She presents today feeling well overall from a cardiac standpoint.  She specifically denies any chest discomfort, dyspnea on exertion, PND, orthopnea or lower extremity edema.  She denies any syncope or presyncope.  She has been taking all her medications as prescribed.      Regarding her breast carcinoma, she recently saw Dr. Lindo on 05/04/2017.  Fortunately, she has responded quite well to Herceptin and Dr. Lindo is planning on Herceptin therapy every 3 weeks without any additional chemotherapy.  Definitive imaging for reassessment is being considered at a 3-month interval.      Her cardiac MRI on 04/21/2017 demonstrated mildly reduced left ventricular systolic function with no significant change compared to prior study.      PHYSICAL EXAMINATION:   GENERAL:  Alert and oriented.   VITAL SIGNS:  Blood pressure was 118/78 with a pulse of 72.   NECK:  Revealed no jugular venous distention or carotid  bruits.   CHEST:  Clear to auscultation.   CARDIOVASCULAR:  Revealed a regular rate and rhythm, no murmurs appreciated.   ABDOMEN:  Soft, nontender.   EXTREMITIES:  Demonstrated no edema.      IMPRESSION:   1.  Recurrent right breast carcinoma, which is responding well to Herceptin therapy.   2.  Mild left ventricular dysfunction, which has been asymptomatic.  This is probably related to prior chemotherapy.      Ms. Guzman appears to be doing well overall from a cardiac standpoint.  There is no evidence of angina or congestive heart failure.  At this point in time, I would recommend followup imaging in approximately 6-8 weeks.  I will not adjust her medical therapy at this point given the fact that she is tolerating her current therapy well and her left ventricular systolic function has remained stable.      It was a pleasure seeing her in followup today.  Assuming her clinical status remains stable, I will plan on follow up Kimmie in approximately 3 months.         MANNY BAIN MD             D: 2017 08:56   T: 2017 13:27   MT: shelly      Name:     KIMMIE GUZMAN   MRN:      [id number]        Account:      [id number]   :      1955           Service Date: 2017      Document: [id number]

## 2017-05-08 NOTE — PROGRESS NOTES
HPI and Plan:   See dictation    Orders Placed This Encounter   Procedures     MRI Cardiac w/contrast     Follow-Up with Cardiac Advanced Practice Provider       No orders of the defined types were placed in this encounter.      Medications Discontinued During This Encounter   Medication Reason     cyclophosphamide (CYTOXAN) 10 mg/mL SOLN Stopped by Patient     DOCETAXEL, NON-ALCOHOL, IV Stopped by Patient         Encounter Diagnosis   Name Primary?     Secondary cardiomyopathy (H)        CURRENT MEDICATIONS:  Current Outpatient Prescriptions   Medication Sig Dispense Refill     lisinopril (PRINIVIL/ZESTRIL) 5 MG tablet Take 1 tablet (5 mg) by mouth daily 90 tablet 3     carvedilol (COREG) 25 MG tablet Take 1 tablet (25 mg) by mouth 2 times daily (with meals) 180 tablet 3     TRASTUZUMAB IV Chemo agent       prochlorperazine (COMPAZINE) 10 MG tablet Take 10 mg by mouth every 6 hours as needed for nausea or vomiting       LORazepam (ATIVAN) 0.5 MG tablet Take 0.5 mg by mouth every 6 hours as needed for anxiety         ALLERGIES   No Known Allergies    PAST MEDICAL HISTORY:  Past Medical History:   Diagnosis Date     Cancer (H)     breast       PAST SURGICAL HISTORY:  Past Surgical History:   Procedure Laterality Date     BREAST SURGERY      March 2006       FAMILY HISTORY:  Family History   Problem Relation Age of Onset     DIABETES Mother      Breast Cancer Mother      Ovarian Cancer Mother      Hypertension Father      Breast Cancer Sister        SOCIAL HISTORY:  Social History     Social History     Marital status:      Spouse name: N/A     Number of children: N/A     Years of education: N/A     Social History Main Topics     Smoking status: Never Smoker     Smokeless tobacco: None     Alcohol use No     Drug use: No     Sexual activity: Yes     Partners: Male     Other Topics Concern     None     Social History Narrative       Review of Systems:  Skin:  Negative       Eyes:  Negative      ENT:  Negative     "  Respiratory:  Negative       Cardiovascular:  Negative      Gastroenterology: Negative      Genitourinary:  Negative      Musculoskeletal:  Negative      Neurologic:  Negative      Psychiatric:  Positive for anxiety (improving) pos breat cancer  Heme/Lymph/Imm:  Negative      Endocrine:  Negative        Physical Exam:  Vitals: /72  Pulse 72  Ht 1.753 m (5' 9\")  Wt 63.5 kg (140 lb)  BMI 20.67 kg/m2    Constitutional:           Skin:           Head:           Eyes:           ENT:           Neck:           Chest:             Cardiac:                    Abdomen:           Vascular:                                          Extremities and Back:                 Neurological:                 CC  Maira Khanna, APRN CNP   [facility]  [address]  [place] [address]              "

## 2017-06-03 ENCOUNTER — HEALTH MAINTENANCE LETTER (OUTPATIENT)
Age: 62
End: 2017-06-03

## 2017-06-15 ENCOUNTER — TRANSFERRED RECORDS (OUTPATIENT)
Dept: CARDIOLOGY | Facility: CLINIC | Age: 62
End: 2017-06-15

## 2017-06-15 ENCOUNTER — TRANSFERRED RECORDS (OUTPATIENT)
Dept: SURGERY | Facility: CLINIC | Age: 62
End: 2017-06-15

## 2017-06-15 LAB
ALBUMIN SERPL-MCNC: 4.1 G/DL
ALP SERPL-CCNC: 28 U/L
ALT SERPL-CCNC: 15 U/L
ANION GAP SERPL CALCULATED.3IONS-SCNC: NORMAL MMOL/L
AST SERPL-CCNC: 15 U/L
BILIRUB SERPL-MCNC: 0.5 MG/DL
BUN SERPL-MCNC: 12 MG/DL
CALCIUM SERPL-MCNC: 9.8 MG/DL
CHLORIDE SERPLBLD-SCNC: 106 MMOL/L
CO2 SERPL-SCNC: 25 MMOL/L
CREAT SERPL-MCNC: 0.88 MG/DL
ERYTHROCYTE [DISTWIDTH] IN BLOOD BY AUTOMATED COUNT: 11.9 %
GFR SERPL CREATININE-BSD FRML MDRD: >60 ML/MIN/1.73M2
GLUCOSE SERPL-MCNC: 100 MG/DL (ref 70–99)
HCT VFR BLD AUTO: 39.9 %
HEMOGLOBIN: 12.8 G/DL
MCH RBC QN AUTO: 31.1 PG
MCHC RBC AUTO-ENTMCNC: 32.1 G/DL
MCV RBC AUTO: 97 FL
PLATELET # BLD AUTO: 140 10^9/L
POTASSIUM SERPL-SCNC: 4.7 MMOL/L
PROT SERPL-MCNC: 6.7 G/DL
RBC # BLD AUTO: 4.11 10^12/L
SODIUM SERPL-SCNC: 143 MMOL/L
WBC # BLD AUTO: 4.3 10^9/L

## 2017-06-20 ENCOUNTER — DOCUMENTATION ONLY (OUTPATIENT)
Dept: CARDIOLOGY | Facility: CLINIC | Age: 62
End: 2017-06-20

## 2017-06-23 DIAGNOSIS — I42.9 CARDIOMYOPATHY, IDIOPATHIC (H): Primary | ICD-10-CM

## 2017-06-26 ENCOUNTER — TELEPHONE (OUTPATIENT)
Dept: CARDIOLOGY | Facility: CLINIC | Age: 62
End: 2017-06-26

## 2017-06-26 DIAGNOSIS — I42.9 CARDIOMYOPATHY, IDIOPATHIC (H): Primary | ICD-10-CM

## 2017-06-26 NOTE — TELEPHONE ENCOUNTER
Per Pt did call [place] [facility] for labs. Pt does not want to do another BMP for cardiac MRI order. REDD Holliday RN

## 2017-06-30 ENCOUNTER — HOSPITAL ENCOUNTER (OUTPATIENT)
Dept: CARDIOLOGY | Facility: CLINIC | Age: 62
Discharge: HOME | End: 2017-06-30
Attending: INTERNAL MEDICINE | Admitting: INTERNAL MEDICINE
Payer: COMMERCIAL

## 2017-06-30 ENCOUNTER — DOCUMENTATION ONLY (OUTPATIENT)
Dept: CARDIOLOGY | Facility: CLINIC | Age: 62
End: 2017-06-30

## 2017-06-30 DIAGNOSIS — I42.9 SECONDARY CARDIOMYOPATHY (H): ICD-10-CM

## 2017-06-30 PROCEDURE — 75557 CARDIAC MRI FOR MORPH: CPT | Mod: 26 | Performed by: INTERNAL MEDICINE

## 2017-06-30 PROCEDURE — 75557 CARDIAC MRI FOR MORPH: CPT

## 2017-06-30 NOTE — PROGRESS NOTES
MRI results patient has F/U OV with BALDOMERO in August 2017.  1. The LV is normal in cavity size and wall thickness. The global systolic function is mildly reduced. The  LVEF is 48%. There is mild diffuse hypokinesis.     2. The RV is normal in cavity size. The global systolic function is normal. The RVEF is 58%.      3. There is mild biatrial enlargement.     4. There is no significant valvular disease.     CONCLUSIONS: LVEF of 48% and RVEF of 58%. The LV and RV function have mildly improved since the last study.

## 2017-08-03 ENCOUNTER — TRANSFERRED RECORDS (OUTPATIENT)
Dept: CARDIOLOGY | Facility: CLINIC | Age: 62
End: 2017-08-03

## 2017-08-03 ENCOUNTER — TRANSFERRED RECORDS (OUTPATIENT)
Dept: SURGERY | Facility: CLINIC | Age: 62
End: 2017-08-03

## 2017-08-04 ENCOUNTER — OFFICE VISIT (OUTPATIENT)
Dept: CARDIOLOGY | Facility: CLINIC | Age: 62
End: 2017-08-04
Attending: INTERNAL MEDICINE
Payer: COMMERCIAL

## 2017-08-04 VITALS
HEIGHT: 69 IN | WEIGHT: 134.8 LBS | SYSTOLIC BLOOD PRESSURE: 130 MMHG | DIASTOLIC BLOOD PRESSURE: 80 MMHG | HEART RATE: 68 BPM | BODY MASS INDEX: 19.96 KG/M2

## 2017-08-04 DIAGNOSIS — I42.9 SECONDARY CARDIOMYOPATHY (H): Primary | ICD-10-CM

## 2017-08-04 PROCEDURE — 99213 OFFICE O/P EST LOW 20 MIN: CPT | Performed by: NURSE PRACTITIONER

## 2017-08-04 NOTE — MR AVS SNAPSHOT
After Visit Summary   8/4/2017    Lorna Guzman    MRN: [id number]           Patient Information     Date Of Birth          1955        Visit Information        Provider Department      8/4/2017 1:10 PM Maira Khanna APRN CNP [facility]        Today's Diagnoses     Secondary cardiomyopathy (H)    -  1      Care Instructions    Thanks for coming into [facility] clinic today.    We discussed:   1. Call with any questions before next visit     Medication changes:    No changes     Follow up:   Dr. Spencer in 3 months with cardiac MRI prior       Please call my nurse Dana at [phone] with any questions or concerns.    Scheduling phone number: [phone]  Reminder: Please bring in all current medications, over the counter supplements and vitamin bottles to your next appointment.                  Follow-ups after your visit        Additional Services     Follow-Up with Cardiologist                 Future tests that were ordered for you today     Open Future Orders        Priority Expected Expires Ordered    Follow-Up with Cardiologist Routine 11/4/2017 8/4/2019 8/4/2017    MRI Cardiac w/o contrast Routine 11/4/2017 8/4/2018 8/4/2017            Who to contact     If you have questions or need follow up information about today's clinic visit or your schedule please contact [facility] directly at [phone].  Normal or non-critical lab and imaging results will be communicated to you by MyChart, letter or phone within 4 business days after the clinic has received the results. If you do not hear from us within 7 days, please contact the clinic through MyChart or phone. If you have a critical or abnormal lab result, we will notify you by phone as soon as possible.  Submit refill requests through [employer] or call your pharmacy and they will forward the refill request to us. Please allow 3  "business days for your refill to be completed.          Additional Information About Your Visit        MyChart Information     Max[employer] gives you secure access to your electronic health record. If you see a primary care provider, you can also send messages to your care team and make appointments. If you have questions, please call your primary care clinic.  If you do not have a primary care provider, please call [phone] and they will assist you.        Care EveryWhere ID     This is your Care EveryWhere ID. This could be used by other organizations to access your [place] medical records  [id number]        Your Vitals Were     Pulse Height BMI (Body Mass Index)             68 1.753 m (5' 9.02\") 19.9 kg/m2          Blood Pressure from Last 3 Encounters:   08/04/17 130/80   05/08/17 118/72   04/04/17 122/70    Weight from Last 3 Encounters:   08/04/17 61.1 kg (134 lb 12.8 oz)   05/08/17 63.5 kg (140 lb)   04/04/17 65.5 kg (144 lb 8 oz)              We Performed the Following     Follow-Up with Cardiac Advanced Practice Provider        Primary Care Provider Office Phone # Fax #    Mir Lindo [phone]       [facility] [address]  [facility] [address]        Equal Access to Services     LORRAINE NANCE : Hadii aad ku hadasho Soomaali, waaxda luqadaha, qaybta kaalmada adeegyada, waxay idiin hayvadimn miles becerril . So [facility] [phone].    ATENCIÓN: Si habla español, tiene a fowler disposición servicios gratuitos de asistencia lingüística. LlUniversity Hospitals Health System [phone].    We comply with applicable federal civil rights laws and [place] laws. We do not discriminate on the basis of race, color, national origin, age, disability sex, sexual orientation or gender identity.            Thank you!     Thank you for choosing [facility] AT [employer]  for your care. Our goal is always to provide you with excellent care. Hearing back from our patients is one " way we can continue to improve our services. Please take a few minutes to complete the written survey that you may receive in the mail after your visit with us. Thank you!             Your Updated Medication List - Protect others around you: Learn how to safely use, store and throw away your medicines at www.disposemymeds.org.          This list is accurate as of: 8/4/17  1:24 PM.  Always use your most recent med list.                   Brand Name Dispense Instructions for use Diagnosis    carvedilol 25 MG tablet    COREG    180 tablet    Take 1 tablet (25 mg) by mouth 2 times daily (with meals)    Secondary cardiomyopathy (H)       lisinopril 5 MG tablet    PRINIVIL/ZESTRIL    90 tablet    Take 1 tablet (5 mg) by mouth daily    Secondary cardiomyopathy (H)       LORazepam 0.5 MG tablet    ATIVAN     Take 0.5 mg by mouth every 6 hours as needed for anxiety        TRASTUZUMAB IV      Chemo agent

## 2017-08-04 NOTE — PROGRESS NOTES
Cardiology Clinic Progress Note  Lorna Guzman MRN# [id number]   YOB: 1955 Age: 61 year old     Reason For Visit: Follow-up cardiomyopathy   Primary Cardiologist:   Dr. Spencer          History of Presenting Illness:    Lorna Guzman is a pleasant 61 year old patient with no past cardiac history. Past medical history significant for recurrent right breast carcinoma.  She has a history of right breast carcinoma diagnosed in 2006 and underwent lumpectomy and AC chemotherapy as well as adjuvant radiation therapy.  She was also placed on adjuvant tamoxifen.  After two years of tamoxifen she transitioned to Aromasin and completed treatment in 2013 with no evidence of recurrent disease.  Unfortunately, in late 2016 she noticed a right breast lump and was diagnosed with invasive ductal carcinoma of the right breast.  Plan was for neoadjuvant chemotherapy with taxane, cyclophosphamide, as well as Herceptin, prior to definitive mastectomy and axillary lymph node dissection.  As part of her chemotherapy workup she underwent echocardiogram which noted a reduced LV systolic function of 47% and she was referred to cardiology.     Pt saw Dr. Spencer 2/6/2017 and he noted the most likely culprit for her cardiomyopathy was her prior history of Adriamycin therapy.  He initiated carvedilol and lisinopril. Note from 2/13/2017 shows that Dr. Spencer discussed cardiac MRI results with Dr. Lindo noting that with her malignancy being HER-2 positive it was reasonable to start Herceptin. Stress cardiac MRI  2/10/2017 showed LVEF 43% with mild to moderate diffuse hypokinesis, RVEF 63% and no ischemia with stress.  Her baseline troponin was less than 0.015 and N-terminal proBNP was 136. Unfortunately, she had a liver biopsy on 2/7/2017 which revealed metastatic carcinoma from her breast cancer. Pt has had uptitration of her cardiomyopathy medications. She did well with chemotherapy and is now having only herceptin every 3 weeks.      Patient was last seen by Dr. Spencer on 5/8/2017 and noted stable LVEF on cardiac MRI.  He recommended follow-up cardiac MRI in two months.    Pt presents today, with her daughter Federica, for follow-up cardiomyopathy.  Cardiac MRI 6/30/2017 showed LV and RV with mild improvement, LVEF 48% with mild diffuse hypokinesis, RVEF 58%, mild biatrial enlargement, and no significant valvular disease.  Compared to prior MRI her previous LVEF was 44% and RVEF was 50%.  These results were discussed with Dr. Spencer who recommended repeat cardiac MRI in three months with follow-up.      Since she was last seen, she has been doing quite well.  She is having no difficulty with her cardiac medications.  She has also been doing well with her Herceptin infusions.  Patient reports no chest pain, shortness of breath, PND, orthopnea, presyncope, syncope, edema, heart racing, or palpitations.      Current Cardiac Medications   Carvedilol 25 mg b.i.d.  Lisinopril 5 mg daily                   Assessment and Plan:     Plan  1.  Continue current medications  2.  Follow with Dr. Spencer in three months with cardiac MRI without contrast, prior        1. Nonischemic Cardiomyopathy    Due to history of Adriamycin therapy    LVEF 48% on cardiac MRI    No signs of heart failure    continue carvedilol and lisinopril         Thank you for allowing me to participate in this delightful patient's care.      This note was completed in part using Dragon voice recognition software. Although reviewed after completion, some word and grammatical errors may occur.    Maira Khanna, TIFFANI, CNP           Data:   All laboratory data reviewed

## 2017-08-04 NOTE — PATIENT INSTRUCTIONS
Thanks for coming into [facility] clinic today.    We discussed:   1. Call with any questions before next visit     Medication changes:    No changes     Follow up:   Dr. Spencer in 3 months with cardiac MRI prior       Please call my nurse Dana at [phone] with any questions or concerns.    Scheduling phone number: [phone]  Reminder: Please bring in all current medications, over the counter supplements and vitamin bottles to your next appointment.

## 2017-08-04 NOTE — PROGRESS NOTES
HPI and Plan:   See dictation    Orders Placed This Encounter   Procedures     MRI Cardiac w/o contrast     Follow-Up with Cardiologist       No orders of the defined types were placed in this encounter.      Medications Discontinued During This Encounter   Medication Reason     prochlorperazine (COMPAZINE) 10 MG tablet Therapy completed         Encounter Diagnosis   Name Primary?     Secondary cardiomyopathy (H) Yes       CURRENT MEDICATIONS:  Current Outpatient Prescriptions   Medication Sig Dispense Refill     lisinopril (PRINIVIL/ZESTRIL) 5 MG tablet Take 1 tablet (5 mg) by mouth daily 90 tablet 3     carvedilol (COREG) 25 MG tablet Take 1 tablet (25 mg) by mouth 2 times daily (with meals) 180 tablet 3     TRASTUZUMAB IV Chemo agent       LORazepam (ATIVAN) 0.5 MG tablet Take 0.5 mg by mouth every 6 hours as needed for anxiety         ALLERGIES   No Known Allergies    PAST MEDICAL HISTORY:  Past Medical History:   Diagnosis Date     Cancer (H)     breast       PAST SURGICAL HISTORY:  Past Surgical History:   Procedure Laterality Date     BREAST SURGERY      March 2006       FAMILY HISTORY:  Family History   Problem Relation Age of Onset     DIABETES Mother      Breast Cancer Mother      Ovarian Cancer Mother      Hypertension Father      Breast Cancer Sister        SOCIAL HISTORY:  Social History     Social History     Marital status:      Spouse name: N/A     Number of children: N/A     Years of education: N/A     Social History Main Topics     Smoking status: Never Smoker     Smokeless tobacco: Never Used     Alcohol use No     Drug use: No     Sexual activity: Yes     Partners: Male     Other Topics Concern     None     Social History Narrative       Review of Systems:  Skin:  Negative       Eyes:  Negative      ENT:  Negative      Respiratory:  Negative       Cardiovascular:  Negative      Gastroenterology: Negative      Genitourinary:  Negative      Musculoskeletal:  Negative      Neurologic:   "Negative      Psychiatric:  Positive for anxiety (improving) pos breat cancer  Heme/Lymph/Imm:  Negative      Endocrine:  Negative        Physical Exam:  Vitals: /80  Pulse 68  Ht 1.753 m (5' 9.02\")  Wt 61.1 kg (134 lb 12.8 oz)  BMI 19.9 kg/m2    Constitutional:  cooperative;alert and oriented thin      Skin:  warm and dry to the touch        Head:  normocephalic        Eyes:  sclera white        ENT:  no pallor or cyanosis        Neck:  carotid pulses are full and equal bilaterally;JVP normal        Chest:  normal breath sounds, clear to auscultation, normal A-P diameter, normal symmetry, normal respiratory excursion, no use of accessory muscles          Cardiac: regular rhythm     no presence of murmur            Abdomen:  abdomen soft        Vascular: pulses full and equal                                        Extremities and Back:  no edema;no deformities, clubbing, cyanosis, erythema observed              Neurological:  affect appropriate, oriented to time, person and place;no gross motor deficits              CC  Miguelal Vinicio Spencer MD   [facility]  [address]  [place] [address]              "

## 2017-08-04 NOTE — LETTER
8/4/2017    Mir Lindo MD  [facility]   [address]  [facility] [address]    RE: Lorna Guzman       Dear Colleague,    I had the pleasure of seeing Lorna Guzman in the [facility].    Cardiology Clinic Progress Note  oLrna Guzman MRN# [id number]   YOB: 1955 Age: 61 year old     Reason For Visit: Follow-up cardiomyopathy   Primary Cardiologist:   Dr. Spencer          History of Presenting Illness:    Lorna Guzamn is a pleasant 61 year old patient with no past cardiac history. Past medical history significant for recurrent right breast carcinoma.  She has a history of right breast carcinoma diagnosed in 2006 and underwent lumpectomy and AC chemotherapy as well as adjuvant radiation therapy.  She was also placed on adjuvant tamoxifen.  After two years of tamoxifen she transitioned to Aromasin and completed treatment in 2013 with no evidence of recurrent disease.  Unfortunately, in late 2016 she noticed a right breast lump and was diagnosed with invasive ductal carcinoma of the right breast.  Plan was for neoadjuvant chemotherapy with taxane, cyclophosphamide, as well as Herceptin, prior to definitive mastectomy and axillary lymph node dissection.  As part of her chemotherapy workup she underwent echocardiogram which noted a reduced LV systolic function of 47% and she was referred to cardiology.     Pt saw Dr. Spencer 2/6/2017 and he noted the most likely culprit for her cardiomyopathy was her prior history of Adriamycin therapy.  He initiated carvedilol and lisinopril. Note from 2/13/2017 shows that Dr. Spencer discussed cardiac MRI results with Dr. Lindo noting that with her malignancy being HER-2 positive it was reasonable to start Herceptin. Stress cardiac MRI  2/10/2017 showed LVEF 43% with mild to moderate diffuse hypokinesis, RVEF 63% and no ischemia with stress.  Her baseline troponin was less than 0.015 and N-terminal proBNP was  136. Unfortunately, she had a liver biopsy on 2/7/2017 which revealed metastatic carcinoma from her breast cancer. Pt has had uptitration of her cardiomyopathy medications. She did well with chemotherapy and is now having only herceptin every 3 weeks.     Patient was last seen by Dr. Spencer on 5/8/2017 and noted stable LVEF on cardiac MRI.  He recommended follow-up cardiac MRI in two months.    Pt presents today, with her daughter Federica, for follow-up cardiomyopathy.  Cardiac MRI 6/30/2017 showed LV and RV with mild improvement, LVEF 48% with mild diffuse hypokinesis, RVEF 58%, mild biatrial enlargement, and no significant valvular disease.  Compared to prior MRI her previous LVEF was 44% and RVEF was 50%.  These results were discussed with Dr. Spencer who recommended repeat cardiac MRI in three months with follow-up.      Since she was last seen, she has been doing quite well.  She is having no difficulty with her cardiac medications.  She has also been doing well with her Herceptin infusions.  Patient reports no chest pain, shortness of breath, PND, orthopnea, presyncope, syncope, edema, heart racing, or palpitations.      Current Cardiac Medications   Carvedilol 25 mg b.i.d.  Lisinopril 5 mg daily                   Assessment and Plan:     Plan  1.  Continue current medications  2.  Follow with Dr. Spencer in three months with cardiac MRI without contrast, prior        1. Nonischemic Cardiomyopathy    Due to history of Adriamycin therapy    LVEF 48% on cardiac MRI    No signs of heart failure    continue carvedilol and lisinopril         Thank you for allowing me to participate in this delightful patient's care.      This note was completed in part using Dragon voice recognition software. Although reviewed after completion, some word and grammatical errors may occur.    Maira Khanna, TIFFANI, CNP           Data:   All laboratory data reviewed    HPI and Plan:   See dictation    Orders Placed This Encounter    Procedures     MRI Cardiac w/o contrast     Follow-Up with Cardiologist       No orders of the defined types were placed in this encounter.      Medications Discontinued During This Encounter   Medication Reason     prochlorperazine (COMPAZINE) 10 MG tablet Therapy completed         Encounter Diagnosis   Name Primary?     Secondary cardiomyopathy (H) Yes       CURRENT MEDICATIONS:  Current Outpatient Prescriptions   Medication Sig Dispense Refill     lisinopril (PRINIVIL/ZESTRIL) 5 MG tablet Take 1 tablet (5 mg) by mouth daily 90 tablet 3     carvedilol (COREG) 25 MG tablet Take 1 tablet (25 mg) by mouth 2 times daily (with meals) 180 tablet 3     TRASTUZUMAB IV Chemo agent       LORazepam (ATIVAN) 0.5 MG tablet Take 0.5 mg by mouth every 6 hours as needed for anxiety         ALLERGIES   No Known Allergies    PAST MEDICAL HISTORY:  Past Medical History:   Diagnosis Date     Cancer (H)     breast       PAST SURGICAL HISTORY:  Past Surgical History:   Procedure Laterality Date     BREAST SURGERY      March 2006       FAMILY HISTORY:  Family History   Problem Relation Age of Onset     DIABETES Mother      Breast Cancer Mother      Ovarian Cancer Mother      Hypertension Father      Breast Cancer Sister        SOCIAL HISTORY:  Social History     Social History     Marital status:      Spouse name: N/A     Number of children: N/A     Years of education: N/A     Social History Main Topics     Smoking status: Never Smoker     Smokeless tobacco: Never Used     Alcohol use No     Drug use: No     Sexual activity: Yes     Partners: Male     Other Topics Concern     None     Social History Narrative       Review of Systems:  Skin:  Negative       Eyes:  Negative      ENT:  Negative      Respiratory:  Negative       Cardiovascular:  Negative      Gastroenterology: Negative      Genitourinary:  Negative      Musculoskeletal:  Negative      Neurologic:  Negative      Psychiatric:  Positive for anxiety (improving) pos  "breat cancer  Heme/Lymph/Imm:  Negative      Endocrine:  Negative        Physical Exam:  Vitals: /80  Pulse 68  Ht 1.753 m (5' 9.02\")  Wt 61.1 kg (134 lb 12.8 oz)  BMI 19.9 kg/m2    Constitutional:  cooperative;alert and oriented thin      Skin:  warm and dry to the touch        Head:  normocephalic        Eyes:  sclera white        ENT:  no pallor or cyanosis        Neck:  carotid pulses are full and equal bilaterally;JVP normal        Chest:  normal breath sounds, clear to auscultation, normal A-P diameter, normal symmetry, normal respiratory excursion, no use of accessory muscles          Cardiac: regular rhythm     no presence of murmur            Abdomen:  abdomen soft        Vascular: pulses full and equal                                        Extremities and Back:  no edema;no deformities, clubbing, cyanosis, erythema observed              Neurological:  affect appropriate, oriented to time, person and place;no gross motor deficits        Thank you for allowing me to participate in the care of your patient.    Sincerely,     TIFFANI [facility]    "

## 2017-08-18 ENCOUNTER — TELEPHONE (OUTPATIENT)
Dept: CARDIOLOGY | Facility: CLINIC | Age: 62
End: 2017-08-18

## 2017-08-18 NOTE — TELEPHONE ENCOUNTER
Spoke with patient who is scheduled for a colonoscopy on Monday and will be on a clear liquid diet on Sunday. Patient noted that on carvedilol medication bottle it states take tablet with meals. Patient wondering what to do? Recommended patient take medication as prescribed with clear liquids meals. Patient begins prep at 4 AM  and wonders what to do for that morning?. Patient then takes more prep starting at 4 AM on Monday and will take AM medications after AM test. Patient will also contact Pharmacist for recommendations.

## 2017-09-14 ENCOUNTER — TRANSFERRED RECORDS (OUTPATIENT)
Dept: SURGERY | Facility: CLINIC | Age: 62
End: 2017-09-14

## 2017-10-26 ENCOUNTER — TRANSFERRED RECORDS (OUTPATIENT)
Dept: HEALTH INFORMATION MANAGEMENT | Facility: CLINIC | Age: 62
End: 2017-10-26

## 2017-11-08 ENCOUNTER — HOSPITAL ENCOUNTER (OUTPATIENT)
Dept: CARDIOLOGY | Facility: CLINIC | Age: 62
Discharge: HOME | End: 2017-11-08
Attending: NURSE PRACTITIONER | Admitting: NURSE PRACTITIONER
Payer: COMMERCIAL

## 2017-11-08 DIAGNOSIS — I42.9 SECONDARY CARDIOMYOPATHY (H): ICD-10-CM

## 2017-11-08 PROCEDURE — 75557 CARDIAC MRI FOR MORPH: CPT | Mod: 26 | Performed by: INTERNAL MEDICINE

## 2017-11-08 PROCEDURE — 75557 CARDIAC MRI FOR MORPH: CPT

## 2017-11-17 ENCOUNTER — OFFICE VISIT (OUTPATIENT)
Dept: CARDIOLOGY | Facility: CLINIC | Age: 62
End: 2017-11-17
Attending: NURSE PRACTITIONER
Payer: COMMERCIAL

## 2017-11-17 VITALS
HEART RATE: 72 BPM | HEIGHT: 69 IN | DIASTOLIC BLOOD PRESSURE: 64 MMHG | BODY MASS INDEX: 20.29 KG/M2 | SYSTOLIC BLOOD PRESSURE: 110 MMHG | WEIGHT: 137 LBS

## 2017-11-17 DIAGNOSIS — I42.9 SECONDARY CARDIOMYOPATHY (H): ICD-10-CM

## 2017-11-17 PROCEDURE — 99214 OFFICE O/P EST MOD 30 MIN: CPT | Performed by: INTERNAL MEDICINE

## 2017-11-17 RX ORDER — VENLAFAXINE HYDROCHLORIDE 37.5 MG/1
37.5 CAPSULE, EXTENDED RELEASE ORAL DAILY
COMMUNITY
End: 2019-01-15

## 2017-11-17 NOTE — LETTER
11/17/2017      Mir Lindo MD  [facility] [address] [facility] [address]  [facility] [address]      RE: Lorna Guzman       Dear Colleague,    I had the pleasure of seeing Lorna Guzman in the [facility].    HISTORY OF PRESENT ILLNESS:  I had the pleasure of seeing Ms. Guzman in followup at [facility] today.  She is a very pleasant 62-year-old female who I last saw in 05/2017.  She has a history of recurrent carcinoma of the right breast with mildly reduced left ventricular systolic function in the absence of symptoms.  Initially, we had her undergo stress perfusion imaging as part of her initial cardiac MRI which was negative for ischemia.  Subsequently, she has been on appropriate medical therapy for cardiomyopathy and her serial cardiac MRIs have demonstrated stable mildly reduced left ventricular systolic function.      She is currently on Herceptin therapy for her breast cancer and has demonstrated an excellent overall response with her most recent evaluation demonstrating no evidence of progressive disease.  From a cardiac standpoint, she remains asymptomatic, denying any chest discomfort, dyspnea on exertion, PND, orthopnea or lower extremity edema.  She denies any syncope or presyncope.  She continues to exercise regularly without any difficulty.      PHYSICAL EXAMINATION:  Dictated below.      LABORATORY DATA:  I personally interpreted her cardiac MRI performed on 11/08/2017.  This demonstrated mildly reduced left ventricular systolic function with a calculated LVEF of 51%.  Right ventricular systolic function was normal.  Left ventricular systolic function was essentially unchanged compared to a prior study from 06/2017.       IMPRESSION:   1.  Recurrent right breast carcinoma which is responding well to Herceptin therapy.   2.  Mild left ventricular dysfunction which has been asymptomatic.  This is probably related to prior  chemotherapy.      Ms. Guzman is doing well overall from a cardiac standpoint.  There is no evidence of angina or congestive heart failure.  Her left ventricular systolic function has been stable on serial evaluations and I think at this point to transition to 6-month followup intervals is reasonable.  I will have her complete a cardiac MRI without contrast at that point.      It was a pleasure seeing her in followup.       Outpatient Encounter Prescriptions as of 11/17/2017   Medication Sig Dispense Refill     venlafaxine (EFFEXOR-XR) 37.5 MG 24 hr capsule Take 37.5 mg by mouth daily       lisinopril (PRINIVIL/ZESTRIL) 5 MG tablet Take 1 tablet (5 mg) by mouth daily 90 tablet 3     carvedilol (COREG) 25 MG tablet Take 1 tablet (25 mg) by mouth 2 times daily (with meals) 180 tablet 3     TRASTUZUMAB IV Chemo agent       LORazepam (ATIVAN) 0.5 MG tablet Take 0.5 mg by mouth every 6 hours as needed for anxiety       No facility-administered encounter medications on file as of 11/17/2017.      Again, thank you for allowing me to participate in the care of your patient.      Sincerely,    Elias Spencer MD     [facility]

## 2017-11-17 NOTE — MR AVS SNAPSHOT
"              After Visit Summary   11/17/2017    Lorna Guzman    MRN: [id number]           Patient Information     Date Of Birth          1955        Visit Information        Provider Department      11/17/2017 2:45 PM Elias Spencer MD [facility]        Today's Diagnoses     Secondary cardiomyopathy (H)           Follow-ups after your visit        Additional Services     Follow-Up with Cardiologist                 Who to contact     If you have questions or need follow up information about today's clinic visit or your schedule please contact [facility] directly at [phone].  Normal or non-critical lab and imaging results will be communicated to you by MyChart, letter or phone within 4 business days after the clinic has received the results. If you do not hear from us within 7 days, please contact the clinic through Credportt or phone. If you have a critical or abnormal lab result, we will notify you by phone as soon as possible.  Submit refill requests through PHRQL or call your pharmacy and they will forward the refill request to us. Please allow 3 business days for your refill to be completed.          Additional Information About Your Visit        MyChart Information     PHRQL gives you secure access to your electronic health record. If you see a primary care provider, you can also send messages to your care team and make appointments. If you have questions, please call your primary care clinic.  If you do not have a primary care provider, please call [phone] and they will assist you.        Care EveryWhere ID     This is your Care EveryWhere ID. This could be used by other organizations to access your [facility] medical records  [id number]        Your Vitals Were     Pulse Height BMI (Body Mass Index)             72 1.753 m (5' 9\") 20.23 kg/m2          Blood Pressure from Last 3 Encounters:   11/17/17 " 110/64   08/04/17 130/80   05/08/17 118/72    Weight from Last 3 Encounters:   11/17/17 62.1 kg (137 lb)   08/04/17 61.1 kg (134 lb 12.8 oz)   05/08/17 63.5 kg (140 lb)              We Performed the Following     Follow-Up with Cardiologist        Primary Care Provider Office Phone # Fax #    Mir Lindo [phone]       [facility] [address]  [facility] [address]        Equal Access to Services     [facility]: Hadii aad ku hadasho Soomaali, waaxda luqadaha, qaybta kaalmada adeegyada, waxay alisonin hayaan adetorey becerril . So [facility] [phone].    ATENCIÓN: Si habla español, tiene a fowler disposición servicios gratuitos de asistencia lingüística. [facility] [phone].    We comply with applicable federal civil rights laws and [place] laws. We do not discriminate on the basis of race, color, national origin, age, disability, sex, sexual orientation, or gender identity.            Thank you!     Thank you for choosing [facility]  for your care. Our goal is always to provide you with excellent care. Hearing back from our patients is one way we can continue to improve our services. Please take a few minutes to complete the written survey that you may receive in the mail after your visit with us. Thank you!             Your Updated Medication List - Protect others around you: Learn how to safely use, store and throw away your medicines at www.disposemymeds.org.          This list is accurate as of: 11/17/17 11:59 PM.  Always use your most recent med list.                   Brand Name Dispense Instructions for use Diagnosis    carvedilol 25 MG tablet    COREG    180 tablet    Take 1 tablet (25 mg) by mouth 2 times daily (with meals)    Secondary cardiomyopathy (H)       lisinopril 5 MG tablet    PRINIVIL/ZESTRIL    90 tablet    Take 1 tablet (5 mg) by mouth daily    Secondary cardiomyopathy (H)       LORazepam 0.5 MG tablet     ATIVAN     Take 0.5 mg by mouth every 6 hours as needed for anxiety        TRASTUZUMAB IV      Chemo agent        venlafaxine 37.5 MG 24 hr capsule    EFFEXOR-XR     Take 37.5 mg by mouth daily

## 2017-11-21 NOTE — PROGRESS NOTES
HPI and Plan:   See dictation    No orders of the defined types were placed in this encounter.      Orders Placed This Encounter   Medications     venlafaxine (EFFEXOR-XR) 37.5 MG 24 hr capsule     Sig: Take 37.5 mg by mouth daily       There are no discontinued medications.      Encounter Diagnosis   Name Primary?     Secondary cardiomyopathy (H)        CURRENT MEDICATIONS:  Current Outpatient Prescriptions   Medication Sig Dispense Refill     venlafaxine (EFFEXOR-XR) 37.5 MG 24 hr capsule Take 37.5 mg by mouth daily       lisinopril (PRINIVIL/ZESTRIL) 5 MG tablet Take 1 tablet (5 mg) by mouth daily 90 tablet 3     carvedilol (COREG) 25 MG tablet Take 1 tablet (25 mg) by mouth 2 times daily (with meals) 180 tablet 3     TRASTUZUMAB IV Chemo agent       LORazepam (ATIVAN) 0.5 MG tablet Take 0.5 mg by mouth every 6 hours as needed for anxiety         ALLERGIES   No Known Allergies    PAST MEDICAL HISTORY:  Past Medical History:   Diagnosis Date     Cancer (H)     breast       PAST SURGICAL HISTORY:  Past Surgical History:   Procedure Laterality Date     BREAST SURGERY      March 2006       FAMILY HISTORY:  Family History   Problem Relation Age of Onset     DIABETES Mother      Breast Cancer Mother      Ovarian Cancer Mother      Hypertension Father      Breast Cancer Sister        SOCIAL HISTORY:  Social History     Social History     Marital status:      Spouse name: N/A     Number of children: N/A     Years of education: N/A     Social History Main Topics     Smoking status: Never Smoker     Smokeless tobacco: Never Used     Alcohol use No     Drug use: No     Sexual activity: Yes     Partners: Male     Other Topics Concern     None     Social History Narrative       Review of Systems:  Skin:  Negative       Eyes:  Negative      ENT:  Negative      Respiratory:  Negative       Cardiovascular:  Negative      Gastroenterology: Negative      Genitourinary:  Negative      Musculoskeletal:  Negative     "  Neurologic:  Negative      Psychiatric:  Positive for anxiety (improving) pos breat cancer  Heme/Lymph/Imm:  Negative      Endocrine:  Negative        Physical Exam:  Vitals: /64  Pulse 72  Ht 1.753 m (5' 9\")  Wt 62.1 kg (137 lb)  BMI 20.23 kg/m2    Constitutional:    thin      Skin:  warm and dry to the touch          Head:  normocephalic        Eyes:  sclera white        Lymph:      ENT:  no pallor or cyanosis        Neck:  carotid pulses are full and equal bilaterally;JVP normal        Respiratory:  normal breath sounds, clear to auscultation, normal A-P diameter, normal symmetry, normal respiratory excursion, no use of accessory muscles         Cardiac: regular rhythm     no presence of murmur          pulses full and equal                                        GI:  abdomen soft        Extremities and Muscular Skeletal:  no edema;no deformities, clubbing, cyanosis, erythema observed              Neurological:           Psych:           HALLEY Khanna, APRN CNP  [address]  [place] [address]              "

## 2017-11-24 NOTE — PROGRESS NOTES
HISTORY OF PRESENT ILLNESS:  I had the pleasure of seeing Ms. Guzman in followup at [facility] today.  She is a very pleasant 62-year-old female who I last saw in 05/2017.  She has a history of recurrent carcinoma of the right breast with mildly reduced left ventricular systolic function in the absence of symptoms.  Initially, we had her undergo stress perfusion imaging as part of her initial cardiac MRI which was negative for ischemia.  Subsequently, she has been on appropriate medical therapy for cardiomyopathy and her serial cardiac MRIs have demonstrated stable mildly reduced left ventricular systolic function.      She is currently on Herceptin therapy for her breast cancer and has demonstrated an excellent overall response with her most recent evaluation demonstrating no evidence of progressive disease.  From a cardiac standpoint, she remains asymptomatic, denying any chest discomfort, dyspnea on exertion, PND, orthopnea or lower extremity edema.  She denies any syncope or presyncope.  She continues to exercise regularly without any difficulty.      PHYSICAL EXAMINATION:  Dictated below.      LABORATORY DATA:  I personally interpreted her cardiac MRI performed on 11/08/2017.  This demonstrated mildly reduced left ventricular systolic function with a calculated LVEF of 51%.  Right ventricular systolic function was normal.  Left ventricular systolic function was essentially unchanged compared to a prior study from 06/2017.       IMPRESSION:   1.  Recurrent right breast carcinoma which is responding well to Herceptin therapy.   2.  Mild left ventricular dysfunction which has been asymptomatic.  This is probably related to prior chemotherapy.      Ms. Guzman is doing well overall from a cardiac standpoint.  There is no evidence of angina or congestive heart failure.  Her left ventricular systolic function has been stable on serial evaluations and I think at this point to  transition to 6-month followup intervals is reasonable.  I will have her complete a cardiac MRI without contrast at that point.      It was a pleasure seeing her in followup.         MANNY BAIN MD             D: 2017 15:02   T: 2017 11:44   MT: WINSTON      Name:     KIMMIE PRICE   MRN:      [id number]        Account:      [id number]   :      1955           Service Date: 2017      Document: [id number]

## 2017-12-07 ENCOUNTER — TRANSFERRED RECORDS (OUTPATIENT)
Dept: HEALTH INFORMATION MANAGEMENT | Facility: CLINIC | Age: 62
End: 2017-12-07

## 2017-12-12 ENCOUNTER — DOCUMENTATION ONLY (OUTPATIENT)
Dept: CARDIOLOGY | Facility: CLINIC | Age: 62
End: 2017-12-12

## 2018-01-18 ENCOUNTER — TRANSFERRED RECORDS (OUTPATIENT)
Dept: HEALTH INFORMATION MANAGEMENT | Facility: CLINIC | Age: 63
End: 2018-01-18

## 2018-01-22 ENCOUNTER — DOCUMENTATION ONLY (OUTPATIENT)
Dept: CARDIOLOGY | Facility: CLINIC | Age: 63
End: 2018-01-22

## 2018-02-22 ENCOUNTER — HOSPITAL ENCOUNTER (OUTPATIENT)
Dept: CARDIOLOGY | Facility: CLINIC | Age: 63
Discharge: HOME | End: 2018-02-22
Payer: COMMERCIAL

## 2018-02-22 DIAGNOSIS — C50.011 MALIGNANT NEOPLASM OF NIPPLE OF RIGHT BREAST IN FEMALE, UNSPECIFIED ESTROGEN RECEPTOR STATUS (H): ICD-10-CM

## 2018-02-22 DIAGNOSIS — Z51.11 ENCOUNTER FOR ANTINEOPLASTIC CHEMOTHERAPY: ICD-10-CM

## 2018-02-22 PROCEDURE — 93306 TTE W/DOPPLER COMPLETE: CPT | Mod: 26 | Performed by: INTERNAL MEDICINE

## 2018-02-22 PROCEDURE — 93306 TTE W/DOPPLER COMPLETE: CPT

## 2018-02-22 PROCEDURE — 0399T ZZHC MYOCARDIAL STRAIN IMAGING: CPT | Performed by: INTERNAL MEDICINE

## 2018-03-28 DIAGNOSIS — I42.9 SECONDARY CARDIOMYOPATHY (H): ICD-10-CM

## 2018-03-28 RX ORDER — CARVEDILOL 25 MG/1
25 TABLET ORAL 2 TIMES DAILY WITH MEALS
Qty: 180 TABLET | Refills: 3 | Status: SHIPPED | OUTPATIENT
Start: 2018-03-28 | End: 2018-08-27

## 2018-03-28 RX ORDER — LISINOPRIL 5 MG/1
5 TABLET ORAL DAILY
Qty: 90 TABLET | Refills: 3 | Status: SHIPPED | OUTPATIENT
Start: 2018-03-28 | End: 2018-10-18

## 2018-04-12 ENCOUNTER — TRANSFERRED RECORDS (OUTPATIENT)
Dept: HEALTH INFORMATION MANAGEMENT | Facility: CLINIC | Age: 63
End: 2018-04-12

## 2018-04-17 ENCOUNTER — DOCUMENTATION ONLY (OUTPATIENT)
Dept: CARDIOLOGY | Facility: CLINIC | Age: 63
End: 2018-04-17

## 2018-05-24 ENCOUNTER — TRANSFERRED RECORDS (OUTPATIENT)
Dept: HEALTH INFORMATION MANAGEMENT | Facility: CLINIC | Age: 63
End: 2018-05-24

## 2018-07-09 ENCOUNTER — HOSPITAL ENCOUNTER (OUTPATIENT)
Dept: CARDIOLOGY | Facility: CLINIC | Age: 63
Discharge: HOME | End: 2018-07-09
Attending: INTERNAL MEDICINE | Admitting: INTERNAL MEDICINE
Payer: COMMERCIAL

## 2018-07-09 DIAGNOSIS — I42.9 SECONDARY CARDIOMYOPATHY (H): ICD-10-CM

## 2018-07-09 PROCEDURE — 75557 CARDIAC MRI FOR MORPH: CPT

## 2018-07-09 PROCEDURE — 75557 CARDIAC MRI FOR MORPH: CPT | Mod: 26 | Performed by: INTERNAL MEDICINE

## 2018-07-11 ENCOUNTER — OFFICE VISIT (OUTPATIENT)
Dept: CARDIOLOGY | Facility: CLINIC | Age: 63
End: 2018-07-11
Attending: INTERNAL MEDICINE
Payer: COMMERCIAL

## 2018-07-11 VITALS
HEART RATE: 76 BPM | DIASTOLIC BLOOD PRESSURE: 68 MMHG | SYSTOLIC BLOOD PRESSURE: 107 MMHG | WEIGHT: 148 LBS | HEIGHT: 69 IN | BODY MASS INDEX: 21.92 KG/M2

## 2018-07-11 DIAGNOSIS — I42.9 SECONDARY CARDIOMYOPATHY (H): ICD-10-CM

## 2018-07-11 PROCEDURE — 99213 OFFICE O/P EST LOW 20 MIN: CPT | Performed by: INTERNAL MEDICINE

## 2018-07-11 NOTE — PROGRESS NOTES
Service Date: 07/11/2018      HISTORY OF PRESENT ILLNESS:  I had the pleasure of seeing Ms. Guzman in followup at the [facility] today.  She is a very pleasant 63-year-old female who I last saw in 11/2017.  She has a history of recurrent carcinoma of the right breast with mildly reduced left ventricular systolic function in   the absence of symptoms.  She underwent stress perfusion imaging which was negative for ischemia and we have been following her with serial cardiac MRIs that have been stable despite regular Herceptin therapy.  She has, as stated above, always been asymptomatic from a cardiovascular standpoint.      Today she presents continuing to feel well.  She specifically denies any chest pain, dyspnea on exertion, PND, orthopnea or lower extremity edema.  She denies any syncope or presyncope.  She has been gardening regularly without any limitations and also works out at the gym.      PHYSICAL EXAMINATION: Her physical exam is dictated below.      She underwent an echocardiogram on 02/22/2018 that demonstrated low normal left ventricular systolic function with an LVEF estimated at 50%-55%.  This was followed by a cardiac MRI on 07/09/2017 which demonstrated similar findings with a calculated LVEF of 51% that was stable compared to her prior study.      IMPRESSION AND PLAN:    1.  Recurrent breast carcinoma which is stable on Herceptin therapy.   2.  Mild left ventricular dysfunction which has been asymptomatic and stable on serial monitoring.  This is most likely related to her prior chemotherapy.   3.  Ms. Guzman is doing well overall from a cardiovascular standpoint.  There is no evidence of angina or congestive heart failure.  Left ventricular systolic function remains stable although mildly reduced.  At this point, I would recommend followup with echocardiography in 3 months or sooner if her clinical condition dictates otherwise.      It was a pleasure seeing her in followup  today.         MANNY BAIN MD             D: 2018   T: 2018   MT: CHAUNCEY      Name:     KIMMIE PRICE   MRN:      [id number]        Account:      [id number]   :      1955           Service Date: 2018      Document: [id number]

## 2018-07-11 NOTE — PROGRESS NOTES
HPI and Plan:   See dictation    Orders Placed This Encounter   Procedures     Echocardiogram       No orders of the defined types were placed in this encounter.      There are no discontinued medications.      Encounter Diagnosis   Name Primary?     Secondary cardiomyopathy (H)        CURRENT MEDICATIONS:  Current Outpatient Prescriptions   Medication Sig Dispense Refill     carvedilol (COREG) 25 MG tablet Take 1 tablet (25 mg) by mouth 2 times daily (with meals) 180 tablet 3     lisinopril (PRINIVIL/ZESTRIL) 5 MG tablet Take 1 tablet (5 mg) by mouth daily 90 tablet 3     LORazepam (ATIVAN) 0.5 MG tablet Take 0.5 mg by mouth every 6 hours as needed for anxiety       TRASTUZUMAB IV Chemo agent       venlafaxine (EFFEXOR-XR) 37.5 MG 24 hr capsule Take 37.5 mg by mouth daily         ALLERGIES   No Known Allergies    PAST MEDICAL HISTORY:  Past Medical History:   Diagnosis Date     Cancer (H)     breast     Cardiomyopathy (H)        PAST SURGICAL HISTORY:  Past Surgical History:   Procedure Laterality Date     BREAST SURGERY      March 2006       FAMILY HISTORY:  Family History   Problem Relation Age of Onset     Diabetes Mother      Breast Cancer Mother      Ovarian Cancer Mother      Hypertension Father      Breast Cancer Sister        SOCIAL HISTORY:  Social History     Social History     Marital status:      Spouse name: N/A     Number of children: N/A     Years of education: N/A     Social History Main Topics     Smoking status: Never Smoker     Smokeless tobacco: Never Used     Alcohol use No     Drug use: No     Sexual activity: Yes     Partners: Male     Other Topics Concern     None     Social History Narrative       Review of Systems:  Skin:  Negative       Eyes:  Negative      ENT:  Negative      Respiratory:  Negative       Cardiovascular:  Negative      Gastroenterology: Negative      Genitourinary:  Negative      Musculoskeletal:  Negative      Neurologic:  Negative      Psychiatric:  Positive for  "anxiety (improving) pos breat cancer  Heme/Lymph/Imm:  Negative      Endocrine:  Negative        Physical Exam:  Vitals: /68  Pulse 76  Ht 1.753 m (5' 9\")  Wt 67.1 kg (148 lb)  BMI 21.86 kg/m2    Constitutional:    thin      Skin:  warm and dry to the touch          Head:  normocephalic        Eyes:  sclera white        Lymph:      ENT:  no pallor or cyanosis        Neck:  carotid pulses are full and equal bilaterally;JVP normal        Respiratory:  normal breath sounds, clear to auscultation, normal A-P diameter, normal symmetry, normal respiratory excursion, no use of accessory muscles         Cardiac: regular rhythm     no presence of murmur          pulses full and equal                                        GI:  abdomen soft        Extremities and Muscular Skeletal:  no edema;no deformities, clubbing, cyanosis, erythema observed              Neurological:           Psych:           CC  Elias Spencer MD  [address]  [place] [address]              "

## 2018-07-11 NOTE — LETTER
7/11/2018    Mir Lindo MD  [facility] [address]  [facility] [address]    RE: Lorna SAAVEDRA Thomas       Dear Colleague,    I had the pleasure of seeing Lorna SAAVEDRA Thomas in the [facility].    HPI and Plan:   See dictation    Orders Placed This Encounter   Procedures     Echocardiogram       No orders of the defined types were placed in this encounter.      There are no discontinued medications.      Encounter Diagnosis   Name Primary?     Secondary cardiomyopathy (H)        CURRENT MEDICATIONS:  Current Outpatient Prescriptions   Medication Sig Dispense Refill     carvedilol (COREG) 25 MG tablet Take 1 tablet (25 mg) by mouth 2 times daily (with meals) 180 tablet 3     lisinopril (PRINIVIL/ZESTRIL) 5 MG tablet Take 1 tablet (5 mg) by mouth daily 90 tablet 3     LORazepam (ATIVAN) 0.5 MG tablet Take 0.5 mg by mouth every 6 hours as needed for anxiety       TRASTUZUMAB IV Chemo agent       venlafaxine (EFFEXOR-XR) 37.5 MG 24 hr capsule Take 37.5 mg by mouth daily         ALLERGIES   No Known Allergies    PAST MEDICAL HISTORY:  Past Medical History:   Diagnosis Date     Cancer (H)     breast     Cardiomyopathy (H)        PAST SURGICAL HISTORY:  Past Surgical History:   Procedure Laterality Date     BREAST SURGERY      March 2006       FAMILY HISTORY:  Family History   Problem Relation Age of Onset     Diabetes Mother      Breast Cancer Mother      Ovarian Cancer Mother      Hypertension Father      Breast Cancer Sister        SOCIAL HISTORY:  Social History     Social History     Marital status:      Spouse name: N/A     Number of children: N/A     Years of education: N/A     Social History Main Topics     Smoking status: Never Smoker     Smokeless tobacco: Never Used     Alcohol use No     Drug use: No     Sexual activity: Yes     Partners: Male     Other Topics Concern     None     Social History Narrative       Review of Systems:  Skin:  Negative     "   Eyes:  Negative      ENT:  Negative      Respiratory:  Negative       Cardiovascular:  Negative      Gastroenterology: Negative      Genitourinary:  Negative      Musculoskeletal:  Negative      Neurologic:  Negative      Psychiatric:  Positive for anxiety (improving) pos breat cancer  Heme/Lymph/Imm:  Negative      Endocrine:  Negative        Physical Exam:  Vitals: /68  Pulse 76  Ht 1.753 m (5' 9\")  Wt 67.1 kg (148 lb)  BMI 21.86 kg/m2    Constitutional:    thin      Skin:  warm and dry to the touch          Head:  normocephalic        Eyes:  sclera white        Lymph:      ENT:  no pallor or cyanosis        Neck:  carotid pulses are full and equal bilaterally;JVP normal        Respiratory:  normal breath sounds, clear to auscultation, normal A-P diameter, normal symmetry, normal respiratory excursion, no use of accessory muscles         Cardiac: regular rhythm     no presence of murmur          pulses full and equal                                        GI:  abdomen soft        Extremities and Muscular Skeletal:  no edema;no deformities, clubbing, cyanosis, erythema observed              Neurological:           Psych:           CC  Elias Spencer MD  [address]  [place] [address]                Thank you for allowing me to participate in the care of your patient.      Sincerely,     Elias Spencer MD     [facility]    cc:   Elias Spencer MD  [address]  [place] [address]        "

## 2018-07-11 NOTE — LETTER
7/11/2018      Mir Lindo MD  [facility] [address]  [facility] [address]      RE: Lorna Guzman       Dear Colleague,    I had the pleasure of seeing Lorna Guzman in the [facility].    Service Date: 07/11/2018      HISTORY OF PRESENT ILLNESS:  I had the pleasure of seeing Ms. Guzman in followup at the [facility] today.  She is a very pleasant 63-year-old female who I last saw in 11/2017.  She has a history of recurrent carcinoma of the right breast with mildly reduced left ventricular systolic function in   the absence of symptoms.  She underwent stress perfusion imaging which was negative for ischemia and we have been following her with serial cardiac MRIs that have been stable despite regular Herceptin therapy.  She has, as stated above, always been asymptomatic from a cardiovascular standpoint.      Today she presents continuing to feel well.  She specifically denies any chest pain, dyspnea on exertion, PND, orthopnea or lower extremity edema.  She denies any syncope or presyncope.  She has been gardening regularly without any limitations and also works out at the gym.      PHYSICAL EXAMINATION: Her physical exam is dictated below.      She underwent an echocardiogram on 02/22/2018 that demonstrated low normal left ventricular systolic function with an LVEF estimated at 50%-55%.  This was followed by a cardiac MRI on 07/09/2017 which demonstrated similar findings with a calculated LVEF of 51% that was stable compared to her prior study.      IMPRESSION AND PLAN:    1.  Recurrent breast carcinoma which is stable on Herceptin therapy.   2.  Mild left ventricular dysfunction which has been asymptomatic and stable on serial monitoring.  This is most likely related to her prior chemotherapy.   3.  Ms. Guzman is doing well overall from a cardiovascular standpoint.  There is no evidence of angina or congestive heart failure.   Left ventricular systolic function remains stable although mildly reduced.  At this point, I would recommend followup with echocardiography in 3 months or sooner if her clinical condition dictates otherwise.      It was a pleasure seeing her in followup today.         ELIAS BAIN MD             D: 2018   T: 2018   MT: CHAUNCEY      Name:     KIMMIE PRICE   MRN:      [id number]        Account:      [id number]   :      1955           Service Date: 2018      Document: [id number]         Outpatient Encounter Prescriptions as of 2018   Medication Sig Dispense Refill     carvedilol (COREG) 25 MG tablet Take 1 tablet (25 mg) by mouth 2 times daily (with meals) 180 tablet 3     lisinopril (PRINIVIL/ZESTRIL) 5 MG tablet Take 1 tablet (5 mg) by mouth daily 90 tablet 3     LORazepam (ATIVAN) 0.5 MG tablet Take 0.5 mg by mouth every 6 hours as needed for anxiety       TRASTUZUMAB IV Chemo agent       venlafaxine (EFFEXOR-XR) 37.5 MG 24 hr capsule Take 37.5 mg by mouth daily       No facility-administered encounter medications on file as of 2018.        Again, thank you for allowing me to participate in the care of your patient.      Sincerely,    Elias Bain MD     [facility]

## 2018-07-11 NOTE — MR AVS SNAPSHOT
After Visit Summary   7/11/2018    Lorna Guzman    MRN: [id number]           Patient Information     Date Of Birth          1955        Visit Information        Provider Department      7/11/2018 10:15 AM Elias Spencer MD [facility]   [place]        Today's Diagnoses     Secondary cardiomyopathy (H)           Follow-ups after your visit        Additional Services     Follow-Up with Cardiologist                 Your next 10 appointments already scheduled     Oct 09, 2018  9:45 AM CDT   Ech Complete with [id number] ([facility] [place])    [address]  [facility] [address] [phone]           1.  Please bring or wear a comfortable two-piece outfit. 2.  You may eat, drink and take your normal medicines. 3.  For any questions that cannot be answered, please contact the ordering physician 4.  Please do not wear perfumes or scented lotions on the day of your exam. ***Please check-in at the [place] Registration Office located in [address] in the [facility] building. When you are finished registering, please go to [address] and have a seat. The technician will call your name for the test.              Future tests that were ordered for you today     Open Future Orders        Priority Expected Expires Ordered    Follow-Up with Cardiologist Routine 1/7/2019 7/11/2019 7/11/2018    Echocardiogram Routine 10/9/2018 7/11/2019 7/11/2018            Who to contact     If you have questions or need follow up information about today's clinic visit or your schedule please contact [facility]   [place] directly at [phone].  Normal or non-critical lab and imaging results will be communicated to you by MyChart, letter or phone within 4 business days after the clinic has received the results. If you do not hear from us within 7 days, please contact the clinic  "through [employer] or phone. If you have a critical or abnormal lab result, we will notify you by phone as soon as possible.  Submit refill requests through [employer] or call your pharmacy and they will forward the refill request to us. Please allow 3 business days for your refill to be completed.          Additional Information About Your Visit        [employer] Information     [employer] gives you secure access to your electronic health record. If you see a primary care provider, you can also send messages to your care team and make appointments. If you have questions, please call your primary care clinic.  If you do not have a primary care provider, please call [phone] and they will assist you.        Care EveryWhere ID     This is your Care EveryWhere ID. This could be used by other organizations to access your [place] medical records  [id number]        Your Vitals Were     Pulse Height BMI (Body Mass Index)             76 1.753 m (5' 9\") 21.86 kg/m2          Blood Pressure from Last 3 Encounters:   07/11/18 107/68   11/17/17 110/64   08/04/17 130/80    Weight from Last 3 Encounters:   07/11/18 67.1 kg (148 lb)   11/17/17 62.1 kg (137 lb)   08/04/17 61.1 kg (134 lb 12.8 oz)              We Performed the Following     Follow-Up with Cardiologist        Primary Care Provider Office Phone # Fax #    Mir Lindo [phone]       [facility] [address]  [facility] [address]        Equal Access to Services     [facility] AH: Hadii aad ku hadasho Soomaali, waaxda luqadaha, qaybta kaalmada adeegyada, waxay lenka hayrachael becerril . So [facility] [phone].    ATENCIÓN: Si habla español, tiene a fowler disposición servicios gratuitos de asistencia lingüística. Llame al [phone].    We comply with applicable federal civil rights laws and [place] laws. We do not discriminate on the basis of race, color, national origin, age, disability, sex, sexual orientation, or gender " identity.            Thank you!     Thank you for choosing [facility]  for your care. Our goal is always to provide you with excellent care. Hearing back from our patients is one way we can continue to improve our services. Please take a few minutes to complete the written survey that you may receive in the mail after your visit with us. Thank you!             Your Updated Medication List - Protect others around you: Learn how to safely use, store and throw away your medicines at www.disposemymeds.org.          This list is accurate as of 7/11/18 10:36 AM.  Always use your most recent med list.                   Brand Name Dispense Instructions for use Diagnosis    carvedilol 25 MG tablet    COREG    180 tablet    Take 1 tablet (25 mg) by mouth 2 times daily (with meals)    Secondary cardiomyopathy (H)       lisinopril 5 MG tablet    PRINIVIL/ZESTRIL    90 tablet    Take 1 tablet (5 mg) by mouth daily    Secondary cardiomyopathy (H)       LORazepam 0.5 MG tablet    ATIVAN     Take 0.5 mg by mouth every 6 hours as needed for anxiety        TRASTUZUMAB IV      Chemo agent        venlafaxine 37.5 MG 24 hr capsule    EFFEXOR-XR     Take 37.5 mg by mouth daily

## 2018-07-26 ENCOUNTER — TRANSFERRED RECORDS (OUTPATIENT)
Dept: HEALTH INFORMATION MANAGEMENT | Facility: CLINIC | Age: 63
End: 2018-07-26

## 2018-08-01 ENCOUNTER — DOCUMENTATION ONLY (OUTPATIENT)
Dept: CARDIOLOGY | Facility: CLINIC | Age: 63
End: 2018-08-01

## 2018-08-27 DIAGNOSIS — I42.9 SECONDARY CARDIOMYOPATHY (H): ICD-10-CM

## 2018-08-27 RX ORDER — CARVEDILOL 25 MG/1
25 TABLET ORAL 2 TIMES DAILY WITH MEALS
Qty: 14 TABLET | Refills: 0 | Status: SHIPPED | OUTPATIENT
Start: 2018-08-27 | End: 2019-04-09

## 2018-09-06 ENCOUNTER — TRANSFERRED RECORDS (OUTPATIENT)
Dept: HEALTH INFORMATION MANAGEMENT | Facility: CLINIC | Age: 63
End: 2018-09-06

## 2018-09-18 ENCOUNTER — TELEPHONE (OUTPATIENT)
Dept: CARDIOLOGY | Facility: CLINIC | Age: 63
End: 2018-09-18

## 2018-09-18 DIAGNOSIS — I10 BENIGN ESSENTIAL HYPERTENSION: Primary | ICD-10-CM

## 2018-09-18 NOTE — TELEPHONE ENCOUNTER
She should just discontinue the lisinopril for now and keep a track of her BP. Lets have her see an BALDOMERO in 2-3 weeks and reassess things at that point. Thanks.

## 2018-09-18 NOTE — TELEPHONE ENCOUNTER
VM from patient stating that she believes she is having an allergic reaction to some of her medications. Patient states that over the summer, she has experienced hives and facial swelling a few different times. Patient states that she met with a pharmacist as well as her PCP to discuss and they believe that lisinopril or carvedilol may be the culprit. Patient is looking for some direction on trying an alternative medication as well as if she needs to come in for follow up. Contacted patient back to review. Patient states that she has had intermittent reactions over the summer. Initially her PCP thought it was due to an antibiotic, but the patient states she has continued to have episodes of facial swelling and hives. Patient states the most recent reaction was this past weekend when she was out in [facility]. Patient states that her whole face swelled up and she had hives. Patient states she did not go to the emergency room, but took benadryl, which improved her symptoms. Patient states that her throat has not swelled during any of the episodes. Patient states that she has seen her PCP about the reactions. Patient states her PCP suggested taking Xyzol once daily to help, which the patient has been doing, and her PCP states that it is most likely from the lisinopril. Patient states that her BP has been stable in the 110s/80s consistently. Patient states that she is still taking all of her medications as prescribed. Will route to Dr. Spencer for review.

## 2018-10-09 ENCOUNTER — HOSPITAL ENCOUNTER (OUTPATIENT)
Dept: CARDIOLOGY | Facility: CLINIC | Age: 63
Discharge: HOME | End: 2018-10-09
Attending: INTERNAL MEDICINE | Admitting: INTERNAL MEDICINE
Payer: COMMERCIAL

## 2018-10-09 DIAGNOSIS — I42.9 SECONDARY CARDIOMYOPATHY (H): ICD-10-CM

## 2018-10-09 PROCEDURE — 0399T ECHO COMPLETE: CPT

## 2018-10-09 PROCEDURE — 93306 TTE W/DOPPLER COMPLETE: CPT | Mod: 26 | Performed by: INTERNAL MEDICINE

## 2018-10-18 ENCOUNTER — OFFICE VISIT (OUTPATIENT)
Dept: CARDIOLOGY | Facility: CLINIC | Age: 63
End: 2018-10-18
Attending: INTERNAL MEDICINE
Payer: COMMERCIAL

## 2018-10-18 VITALS
BODY MASS INDEX: 23.11 KG/M2 | DIASTOLIC BLOOD PRESSURE: 64 MMHG | HEART RATE: 70 BPM | WEIGHT: 156 LBS | HEIGHT: 69 IN | SYSTOLIC BLOOD PRESSURE: 108 MMHG

## 2018-10-18 DIAGNOSIS — T78.3XXD ANGIOEDEMA, SUBSEQUENT ENCOUNTER: ICD-10-CM

## 2018-10-18 DIAGNOSIS — I42.9 SECONDARY CARDIOMYOPATHY (H): Primary | ICD-10-CM

## 2018-10-18 PROCEDURE — 99214 OFFICE O/P EST MOD 30 MIN: CPT | Performed by: PHYSICIAN ASSISTANT

## 2018-10-18 NOTE — MR AVS SNAPSHOT
"              After Visit Summary   10/18/2018    Lorna Guzman    MRN: [id number]           Patient Information     Date Of Birth          1955        Visit Information        Provider Department      10/18/2018 10:30 AM Melisa Billingsley PA-C [facility]        Today's Diagnoses     Secondary cardiomyopathy (H)    -  1      Care Instructions    Thank you for your  Heart Care visit today. Your provider has recommended the following:  Medication Changes:  Stay off the lisinopril  I will check with Dr Spencer if he wants to use a \"replacement\" medication, but I think that it is unlikely at this time  Recommendations:  Ok to continue with Herceptin therapy  Follow-up:  See Melisa JOHNSON for cardiology follow up in 3 months with an echo prior.  I will let you know if Dr Spencer feels this needs to be an MRI    Reminder:  Please bring in your current medication list or your medication, over the counter supplements and vitamin bottles as we will review these at each office visit.                  Follow-ups after your visit        Additional Services     Follow-Up with Cardiac Advanced Practice Provider                 Future tests that were ordered for you today     Open Future Orders        Priority Expected Expires Ordered    Echocardiogram Routine 1/16/2019 10/18/2019 10/18/2018    Follow-Up with Cardiac Advanced Practice Provider Routine 1/16/2019 10/18/2019 10/18/2018            Who to contact     If you have questions or need follow up information about today's clinic visit or your schedule please contact [facility] directly at [phone].  Normal or non-critical lab and imaging results will be communicated to you by MyChart, letter or phone within 4 business days after the clinic has received the results. If you do not hear from us within 7 days, please contact the clinic through MyChart or phone. If you have a critical " "or abnormal lab result, we will notify you by phone as soon as possible.  Submit refill requests through [employer] or call your pharmacy and they will forward the refill request to us. Please allow 3 business days for your refill to be completed.          Additional Information About Your Visit        [employer] Information     [employer] gives you secure access to your electronic health record. If you see a primary care provider, you can also send messages to your care team and make appointments. If you have questions, please call your primary care clinic.  If you do not have a primary care provider, please call [phone] and they will assist you.        Care EveryWhere ID     This is your Care EveryWhere ID. This could be used by other organizations to access your [facility] medical records  [id number]        Your Vitals Were     Pulse Height BMI (Body Mass Index)             70 1.753 m (5' 9\") 23.04 kg/m2          Blood Pressure from Last 3 Encounters:   10/18/18 108/64   07/11/18 107/68   11/17/17 110/64    Weight from Last 3 Encounters:   10/18/18 70.8 kg (156 lb)   07/11/18 67.1 kg (148 lb)   11/17/17 62.1 kg (137 lb)              We Performed the Following     Follow-Up with Cardiac Advanced Practice Provider          Today's Medication Changes          These changes are accurate as of 10/18/18 11:04 AM.  If you have any questions, ask your nurse or doctor.               Stop taking these medicines if you haven't already. Please contact your care team if you have questions.     lisinopril 5 MG tablet   Commonly known as:  PRINIVIL/ZESTRIL   Stopped by:  Melisa Billingsley PA-C                    Primary Care Provider Office Phone # Fax #    Mir Lindo [phone]       [facility] [address]  [facility] [address]        Equal Access to Services     LORRAINE NANCE AH: Hadii leighann caceres hadasho Soomaali, waaxda luqadaha, qaybta kaalmada milesyasumit, lilibeth aquino " reed becerril ah. So [facility] [phone].    ATENCIÓN: Si odin tolliver, tiene a fowler disposición servicios gratuitos de asistencia lingüística. Cindy foster [phone].    We comply with applicable federal civil rights laws and [place] laws. We do not discriminate on the basis of race, color, national origin, age, disability, sex, sexual orientation, or gender identity.            Thank you!     Thank you for choosing [facility]  for your care. Our goal is always to provide you with excellent care. Hearing back from our patients is one way we can continue to improve our services. Please take a few minutes to complete the written survey that you may receive in the mail after your visit with us. Thank you!             Your Updated Medication List - Protect others around you: Learn how to safely use, store and throw away your medicines at www.disposemymeds.org.          This list is accurate as of 10/18/18 11:04 AM.  Always use your most recent med list.                   Brand Name Dispense Instructions for use Diagnosis    carvedilol 25 MG tablet    COREG    14 tablet    Take 1 tablet (25 mg) by mouth 2 times daily (with meals)    Secondary cardiomyopathy (H)       LORazepam 0.5 MG tablet    ATIVAN     Take 0.5 mg by mouth every 6 hours as needed for anxiety        TRASTUZUMAB IV      Chemo agent        venlafaxine 37.5 MG 24 hr capsule    EFFEXOR-XR     Take 37.5 mg by mouth daily

## 2018-10-18 NOTE — PROGRESS NOTES
Service Date: 10/18/2018      HISTORY OF PRESENT ILLNESS:  Ms. Guzman is a very pleasant 63-year-old female who presents to the office today to follow up after a recent echocardiogram and follow up after recent discontinuation of lisinopril.      She has been following with Dr. Spencer from Cardio-Oncology perspective due to a history of chemo-induced cardiomyopathy and right breast carcinoma with ongoing Herceptin use.      She was initially diagnosed with a right breast carcinoma in 2006.  At that time, she underwent lumpectomy and AC chemotherapy as well as adjuvant radiation therapy.  She was started on tamoxifen at that time and after 2 years was transitioned to Aromasin.  She completed this treatment in 07/2013.  Unfortunately, in late 01/2017 she was again diagnosed with invasive ductal carcinoma of the right breast.  She was found to have evidence of metastatic disease.  As part of her workup, she underwent an echocardiogram which showed mildly reduced LV systolic function with an EF of 47%.  She was referred to Cardiology at that time.  She was subsequently sent for a stress cardiac MRI.  This showed normal LV cavity size.  She did have mild to moderately reduced LV systolic function with an EF of 43%.  She was noted to have mitral valve prolapse and mild mitral regurgitation.  Stress images did not show any evidence of ischemia.  She was started on cardioprotective therapy with carvedilol.  Additionally, she was started on a low dose of lisinopril.  These medications were up titrated as tolerated.  Her cancer was treated with 4 cycles of TC chemotherapy and Herceptin on an every 3-week basis.  She had great response to the treatment and therefore the plan is to continue her on long-term Herceptin infusions every 3 weeks.  Fortunately, her followup MRIs and echocardiograms have shown slight improvement/stability with ongoing therapy.      A couple of months ago, the patient started noticing diffuse hives.   She also was having intermittent facial swelling.  In August, she was on a trip and developed severe facial swelling.  Fortunately, she did not have any respiratory symptoms.  She was in contact with the office and it was felt that this was most likely related to her lisinopril.  Her lisinopril was stopped almost 2 months ago.  Since that time she has not had any recurrent facial swelling and her hives have pretty much completely disappeared.  She denies any side effects from her carvedilol.      She recently had an echocardiogram.  The EF was estimated at 55%.  The global LV longitudinal strain was averaged at -20.1% which is within normal limits.  The reader mentions that compared to the prior echo, the LVEF was essentially the same while the GLS was slightly higher.      CURRENT CARDIAC MEDICATIONS:  Carvedilol 25 mg b.i.d.      The remainder of her medications, allergies and review of systems were reviewed and as are documented separately.      PHYSICAL EXAMINATION:   GENERAL:  The patient is a pleasant 63-year-old female who appears her stated age.  She is in no apparent distress.   VITAL SIGNS:  Her blood pressure is 108/64, pulse 70, weight is 156 pounds.     PULMONARY:  Breathing is nonlabored and lungs are clear to auscultation.   CARDIAC:  Reveals a regular rate and rhythm.  I do not appreciate any murmur.   EXTREMITIES:  Lower extremities show no evidence of edema.   NEUROLOGIC:  Alert and oriented.      ASSESSMENT AND PLAN:  The patient is a pleasant 63-year-old female with a history of recurrent and metastatic breast cancer.  She previously underwent treatment with Adriamycin in 2006 and it is felt that she developed a chemo-induced cardiomyopathy.  She currently is getting treatment with Herceptin and fortunately serial echocardiograms and cardiac MRIs have shown stable to slightly improved LV systolic function.  She has been on carvedilol and is tolerating this without difficulty.  She had been on  lisinopril; however, she developed angioedema and this was discontinued.  Her symptoms have resolved since discontinuation of the medication.  I instructed her that she should no longer be treated with ACE inhibitors.  At this time, since her EF is normal I am not going to initiate an ARB; however, I did discuss that if we see her echo or MRI changing that we may reconsider this.  At this point, I do not see any contraindication in her continuing with Herceptin therapy.  We will plan to see her back in the Cardiology office in approximately 3 months with a repeat echocardiogram prior to that office visit.  I will provide an update to Dr. Spencer as well.      Thank you for allowing me to participate in the care of this pleasant patient.         ANGEL MCDONNELL PA-C             D: 10/18/2018   T: 10/18/2018   MT: ASAEL      Name:     KIMMIE PRICE   MRN:      [id number]        Account:      [id number]   :      1955           Service Date: 10/18/2018      Document: [id number]

## 2018-10-18 NOTE — LETTER
10/18/2018      Mir Lindo MD  [facility] [address]  [facility] [address]      RE: Lorna Guzman       Dear Colleague,    I had the pleasure of seeing Lorna Guzman in the [facility].    Service Date: 10/18/2018      HISTORY OF PRESENT ILLNESS:  Ms. Guzman is a very pleasant 63-year-old female who presents to the office today to follow up after a recent echocardiogram and follow up after recent discontinuation of lisinopril.      She has been following with Dr. Spencer from Cardio-Oncology perspective due to a history of chemo-induced cardiomyopathy and right breast carcinoma with ongoing Herceptin use.      She was initially diagnosed with a right breast carcinoma in 2006.  At that time, she underwent lumpectomy and AC chemotherapy as well as adjuvant radiation therapy.  She was started on tamoxifen at that time and after 2 years was transitioned to Aromasin.  She completed this treatment in 07/2013.  Unfortunately, in late 01/2017 she was again diagnosed with invasive ductal carcinoma of the right breast.  She was found to have evidence of metastatic disease.  As part of her workup, she underwent an echocardiogram which showed mildly reduced LV systolic function with an EF of 47%.  She was referred to Cardiology at that time.  She was subsequently sent for a stress cardiac MRI.  This showed normal LV cavity size.  She did have mild to moderately reduced LV systolic function with an EF of 43%.  She was noted to have mitral valve prolapse and mild mitral regurgitation.  Stress images did not show any evidence of ischemia.  She was started on cardioprotective therapy with carvedilol.  Additionally, she was started on a low dose of lisinopril.  These medications were up titrated as tolerated.  Her cancer was treated with 4 cycles of TC chemotherapy and Herceptin on an every 3-week basis.  She had great response to the treatment and therefore the plan  is to continue her on long-term Herceptin infusions every 3 weeks.  Fortunately, her followup MRIs and echocardiograms have shown slight improvement/stability with ongoing therapy.      A couple of months ago, the patient started noticing diffuse hives.  She also was having intermittent facial swelling.  In August, she was on a trip and developed severe facial swelling.  Fortunately, she did not have any respiratory symptoms.  She was in contact with the office and it was felt that this was most likely related to her lisinopril.  Her lisinopril was stopped almost 2 months ago.  Since that time she has not had any recurrent facial swelling and her hives have pretty much completely disappeared.  She denies any side effects from her carvedilol.      She recently had an echocardiogram.  The EF was estimated at 55%.  The global LV longitudinal strain was averaged at -20.1% which is within normal limits.  The reader mentions that compared to the prior echo, the LVEF was essentially the same while the GLS was slightly higher.      CURRENT CARDIAC MEDICATIONS:  Carvedilol 25 mg b.i.d.      The remainder of her medications, allergies and review of systems were reviewed and as are documented separately.      PHYSICAL EXAMINATION:   GENERAL:  The patient is a pleasant 63-year-old female who appears her stated age.  She is in no apparent distress.   VITAL SIGNS:  Her blood pressure is 108/64, pulse 70, weight is 156 pounds.     PULMONARY:  Breathing is nonlabored and lungs are clear to auscultation.   CARDIAC:  Reveals a regular rate and rhythm.  I do not appreciate any murmur.   EXTREMITIES:  Lower extremities show no evidence of edema.   NEUROLOGIC:  Alert and oriented.      ASSESSMENT AND PLAN:  The patient is a pleasant 63-year-old female with a history of recurrent and metastatic breast cancer.  She previously underwent treatment with Adriamycin in 2006 and it is felt that she developed a chemo-induced cardiomyopathy.  She  currently is getting treatment with Herceptin and fortunately serial echocardiograms and cardiac MRIs have shown stable to slightly improved LV systolic function.  She has been on carvedilol and is tolerating this without difficulty.  She had been on lisinopril; however, she developed angioedema and this was discontinued.  Her symptoms have resolved since discontinuation of the medication.  I instructed her that she should no longer be treated with ACE inhibitors.  At this time, since her EF is normal I am not going to initiate an ARB; however, I did discuss that if we see her echo or MRI changing that we may reconsider this.  At this point, I do not see any contraindication in her continuing with Herceptin therapy.  We will plan to see her back in the Cardiology office in approximately 3 months with a repeat echocardiogram prior to that office visit.  I will provide an update to Dr. Spencer as well.      Thank you for allowing me to participate in the care of this pleasant patient.         ANGEL MCDONNELL PA-C             D: 10/18/2018   T: 10/18/2018   MT: ASAEL      Name:     KIMMIE PRICE   MRN:      [id number]        Account:      [id number]   :      1955           Service Date: 10/18/2018      Document: [id number]         Outpatient Encounter Prescriptions as of 10/18/2018   Medication Sig Dispense Refill     carvedilol (COREG) 25 MG tablet Take 1 tablet (25 mg) by mouth 2 times daily (with meals) 14 tablet 0     TRASTUZUMAB IV Chemo agent       venlafaxine (EFFEXOR-XR) 37.5 MG 24 hr capsule Take 37.5 mg by mouth daily       LORazepam (ATIVAN) 0.5 MG tablet Take 0.5 mg by mouth every 6 hours as needed for anxiety       [DISCONTINUED] lisinopril (PRINIVIL/ZESTRIL) 5 MG tablet Take 1 tablet (5 mg) by mouth daily (Patient not taking: Reported on 10/18/2018) 90 tablet 3     No facility-administered encounter medications on file as of 10/18/2018.        Again, thank you for allowing me to  participate in the care of your patient.      Sincerely,    Melisa Billingsley PA-C     [facility]

## 2018-10-18 NOTE — LETTER
10/18/2018    Mir Lindo MD  [facility] [address] [facility] [address]  [facility], [place] [address]    RE: Lorna A Thomas       Dear Colleague,    I had the pleasure of seeing Lorna Guzman in the [facility].    Please see separate dictation for HPI, PHYSICAL EXAM AND IMPRESSION/PLAN.    CURRENT MEDICATIONS:  Current Outpatient Prescriptions   Medication Sig Dispense Refill     carvedilol (COREG) 25 MG tablet Take 1 tablet (25 mg) by mouth 2 times daily (with meals) 14 tablet 0     TRASTUZUMAB IV Chemo agent       venlafaxine (EFFEXOR-XR) 37.5 MG 24 hr capsule Take 37.5 mg by mouth daily       lisinopril (PRINIVIL/ZESTRIL) 5 MG tablet Take 1 tablet (5 mg) by mouth daily (Patient not taking: Reported on 10/18/2018) 90 tablet 3     LORazepam (ATIVAN) 0.5 MG tablet Take 0.5 mg by mouth every 6 hours as needed for anxiety         ALLERGIES:   No Known Allergies    PAST MEDICAL HISTORY:  Past Medical History:   Diagnosis Date     Cancer (H)     breast     Cardiomyopathy (H)        PAST SURGICAL HISTORY:  Past Surgical History:   Procedure Laterality Date     BREAST SURGERY      March 2006       SOCIAL HISTORY:  Social History     Social History     Marital status:      Spouse name: N/A     Number of children: N/A     Years of education: N/A     Social History Main Topics     Smoking status: Never Smoker     Smokeless tobacco: Never Used     Alcohol use No     Drug use: No     Sexual activity: Yes     Partners: Male     Other Topics Concern     None     Social History Narrative       FAMILY HISTORY:  Family History   Problem Relation Age of Onset     Diabetes Mother      Breast Cancer Mother      Ovarian Cancer Mother      Hypertension Father      Breast Cancer Sister        Review of Systems:  Skin:  Negative for       Eyes:  Negative for      ENT:  Negative for      Respiratory:  Negative       Cardiovascular:  Negative      Gastroenterology: Negative      Genitourinary:   Negative for      Musculoskeletal:  Negative      Neurologic:  Positive for numbness or tingling of hands    Psychiatric:  Negative for      Heme/Lymph/Imm:  Negative      Endocrine:  Negative         Reviewed. Remainder of the note dictated.    Melisa Billingsley PA-C        Service Date: 10/18/2018      HISTORY OF PRESENT ILLNESS:  Ms. Guzman is a very pleasant 63-year-old female who presents to the office today to follow up after a recent echocardiogram and follow up after recent discontinuation of lisinopril.      She has been following with Dr. Spencer from Cardio-Oncology perspective due to a history of chemo-induced cardiomyopathy and right breast carcinoma with ongoing Herceptin use.      She was initially diagnosed with a right breast carcinoma in 2006.  At that time, she underwent lumpectomy and AC chemotherapy as well as adjuvant radiation therapy.  She was started on tamoxifen at that time and after 2 years was transitioned to Aromasin.  She completed this treatment in 07/2013.  Unfortunately, in late 01/2017 she was again diagnosed with invasive ductal carcinoma of the right breast.  She was found to have evidence of metastatic disease.  As part of her workup, she underwent an echocardiogram which showed mildly reduced LV systolic function with an EF of 47%.  She was referred to Cardiology at that time.  She was subsequently sent for a stress cardiac MRI.  This showed normal LV cavity size.  She did have mild to moderately reduced LV systolic function with an EF of 43%.  She was noted to have mitral valve prolapse and mild mitral regurgitation.  Stress images did not show any evidence of ischemia.  She was started on cardioprotective therapy with carvedilol.  Additionally, she was started on a low dose of lisinopril.  These medications were up titrated as tolerated.  Her cancer was treated with 4 cycles of TC chemotherapy and Herceptin on an every 3-week basis.  She had great response to the  treatment and therefore the plan is to continue her on long-term Herceptin infusions every 3 weeks.  Fortunately, her followup MRIs and echocardiograms have shown slight improvement/stability with ongoing therapy.      A couple of months ago, the patient started noticing diffuse hives.  She also was having intermittent facial swelling.  In August, she was on a trip and developed severe facial swelling.  Fortunately, she did not have any respiratory symptoms.  She was in contact with the office and it was felt that this was most likely related to her lisinopril.  Her lisinopril was stopped almost 2 months ago.  Since that time she has not had any recurrent facial swelling and her hives have pretty much completely disappeared.  She denies any side effects from her carvedilol.      She recently had an echocardiogram.  The EF was estimated at 55%.  The global LV longitudinal strain was averaged at -20.1% which is within normal limits.  The reader mentions that compared to the prior echo, the LVEF was essentially the same while the GLS was slightly higher.      CURRENT CARDIAC MEDICATIONS:  Carvedilol 25 mg b.i.d.      The remainder of her medications, allergies and review of systems were reviewed and as are documented separately.      PHYSICAL EXAMINATION:   GENERAL:  The patient is a pleasant 63-year-old female who appears her stated age.  She is in no apparent distress.   VITAL SIGNS:  Her blood pressure is 108/64, pulse 70, weight is 156 pounds.     PULMONARY:  Breathing is nonlabored and lungs are clear to auscultation.   CARDIAC:  Reveals a regular rate and rhythm.  I do not appreciate any murmur.   EXTREMITIES:  Lower extremities show no evidence of edema.   NEUROLOGIC:  Alert and oriented.      ASSESSMENT AND PLAN:  The patient is a pleasant 63-year-old female with a history of recurrent and metastatic breast cancer.  She previously underwent treatment with Adriamycin in 2006 and it is felt that she developed a  chemo-induced cardiomyopathy.  She currently is getting treatment with Herceptin and fortunately serial echocardiograms and cardiac MRIs have shown stable to slightly improved LV systolic function.  She has been on carvedilol and is tolerating this without difficulty.  She had been on lisinopril; however, she developed angioedema and this was discontinued.  Her symptoms have resolved since discontinuation of the medication.  I instructed her that she should no longer be treated with ACE inhibitors.  At this time, since her EF is normal I am not going to initiate an ARB; however, I did discuss that if we see her echo or MRI changing that we may reconsider this.  At this point, I do not see any contraindication in her continuing with Herceptin therapy.  We will plan to see her back in the Cardiology office in approximately 3 months with a repeat echocardiogram prior to that office visit.  I will provide an update to Dr. Spencer as well.      Thank you for allowing me to participate in the care of this pleasant patient.         MELISA MCDONNELL PA-C             D: 10/18/2018   T: 10/18/2018   MT: ASAEL      Name:     KIMMIE PRICE   MRN:      [id number]        Account:      [id number]   :      1955           Service Date: 10/18/2018      Document: [id number]       Thank you for allowing me to participate in the care of your patient.      Sincerely,     Melisa Mcdonnell PA-C     [facility]    cc:   Elias Spencer MD  [address]  [place] [address]

## 2018-10-18 NOTE — PROGRESS NOTES
Please see separate dictation for HPI, PHYSICAL EXAM AND IMPRESSION/PLAN.    CURRENT MEDICATIONS:  Current Outpatient Prescriptions   Medication Sig Dispense Refill     carvedilol (COREG) 25 MG tablet Take 1 tablet (25 mg) by mouth 2 times daily (with meals) 14 tablet 0     TRASTUZUMAB IV Chemo agent       venlafaxine (EFFEXOR-XR) 37.5 MG 24 hr capsule Take 37.5 mg by mouth daily       lisinopril (PRINIVIL/ZESTRIL) 5 MG tablet Take 1 tablet (5 mg) by mouth daily (Patient not taking: Reported on 10/18/2018) 90 tablet 3     LORazepam (ATIVAN) 0.5 MG tablet Take 0.5 mg by mouth every 6 hours as needed for anxiety         ALLERGIES:   No Known Allergies    PAST MEDICAL HISTORY:  Past Medical History:   Diagnosis Date     Cancer (H)     breast     Cardiomyopathy (H)        PAST SURGICAL HISTORY:  Past Surgical History:   Procedure Laterality Date     BREAST SURGERY      March 2006       SOCIAL HISTORY:  Social History     Social History     Marital status:      Spouse name: N/A     Number of children: N/A     Years of education: N/A     Social History Main Topics     Smoking status: Never Smoker     Smokeless tobacco: Never Used     Alcohol use No     Drug use: No     Sexual activity: Yes     Partners: Male     Other Topics Concern     None     Social History Narrative       FAMILY HISTORY:  Family History   Problem Relation Age of Onset     Diabetes Mother      Breast Cancer Mother      Ovarian Cancer Mother      Hypertension Father      Breast Cancer Sister        Review of Systems:  Skin:  Negative for       Eyes:  Negative for      ENT:  Negative for      Respiratory:  Negative       Cardiovascular:  Negative      Gastroenterology: Negative      Genitourinary:  Negative for      Musculoskeletal:  Negative      Neurologic:  Positive for numbness or tingling of hands    Psychiatric:  Negative for      Heme/Lymph/Imm:  Negative      Endocrine:  Negative         Reviewed. Remainder of the note  dictated.    Melisa Billingsley PA-C

## 2018-10-18 NOTE — PATIENT INSTRUCTIONS
"Thank you for your [facility] visit today. Your provider has recommended the following:  Medication Changes:  Stay off the lisinopril  I will check with Dr Spencer if he wants to use a \"replacement\" medication, but I think that it is unlikely at this time  Recommendations:  Ok to continue with Herceptin therapy  Follow-up:  See Melisa JOHNSON for cardiology follow up in 3 months with an echo prior.  I will let you know if Dr Spencer feels this needs to be an MRI    Reminder:  Please bring in your current medication list or your medication, over the counter supplements and vitamin bottles as we will review these at each office visit.          "

## 2018-11-08 ENCOUNTER — TRANSFERRED RECORDS (OUTPATIENT)
Dept: HEALTH INFORMATION MANAGEMENT | Facility: CLINIC | Age: 63
End: 2018-11-08

## 2019-01-10 ENCOUNTER — TRANSFERRED RECORDS (OUTPATIENT)
Dept: HEALTH INFORMATION MANAGEMENT | Facility: CLINIC | Age: 64
End: 2019-01-10

## 2019-01-14 ENCOUNTER — HOSPITAL ENCOUNTER (OUTPATIENT)
Dept: CARDIOLOGY | Facility: CLINIC | Age: 64
Discharge: HOME | End: 2019-01-14
Attending: PHYSICIAN ASSISTANT | Admitting: PHYSICIAN ASSISTANT
Payer: COMMERCIAL

## 2019-01-14 ENCOUNTER — DOCUMENTATION ONLY (OUTPATIENT)
Dept: CARDIOLOGY | Facility: CLINIC | Age: 64
End: 2019-01-14

## 2019-01-14 DIAGNOSIS — I42.9 SECONDARY CARDIOMYOPATHY (H): ICD-10-CM

## 2019-01-14 PROCEDURE — 93325 DOPPLER ECHO COLOR FLOW MAPG: CPT | Mod: 26 | Performed by: INTERNAL MEDICINE

## 2019-01-14 PROCEDURE — 93308 TTE F-UP OR LMTD: CPT | Mod: 26 | Performed by: INTERNAL MEDICINE

## 2019-01-14 PROCEDURE — 93321 DOPPLER ECHO F-UP/LMTD STD: CPT | Mod: 26 | Performed by: INTERNAL MEDICINE

## 2019-01-14 PROCEDURE — 93308 TTE F-UP OR LMTD: CPT

## 2019-01-15 ENCOUNTER — OFFICE VISIT (OUTPATIENT)
Dept: CARDIOLOGY | Facility: CLINIC | Age: 64
End: 2019-01-15
Payer: COMMERCIAL

## 2019-01-15 VITALS
SYSTOLIC BLOOD PRESSURE: 114 MMHG | WEIGHT: 165.7 LBS | DIASTOLIC BLOOD PRESSURE: 78 MMHG | BODY MASS INDEX: 24.54 KG/M2 | HEIGHT: 69 IN | HEART RATE: 72 BPM

## 2019-01-15 DIAGNOSIS — I42.9 SECONDARY CARDIOMYOPATHY (H): Primary | ICD-10-CM

## 2019-01-15 PROCEDURE — 99214 OFFICE O/P EST MOD 30 MIN: CPT | Performed by: PHYSICIAN ASSISTANT

## 2019-01-15 ASSESSMENT — MIFFLIN-ST. JEOR: SCORE: 1380.99

## 2019-01-15 NOTE — LETTER
1/15/2019    Mir Lindo MD  [facility] [address]  [facility] [address]    RE: Lorna A Thomas       Dear Colleague,    I had the pleasure of seeing Lorna Guzman in the [facility].    Please see separate dictation for HPI, PHYSICAL EXAM AND IMPRESSION/PLAN.    CURRENT MEDICATIONS:  Current Outpatient Medications   Medication Sig Dispense Refill     carvedilol (COREG) 25 MG tablet Take 1 tablet (25 mg) by mouth 2 times daily (with meals) 14 tablet 0     Multiple Vitamins-Minerals (MULTIVITAMIN PO) Take by mouth daily       TRASTUZUMAB IV Chemo agent       LORazepam (ATIVAN) 0.5 MG tablet Take 0.5 mg by mouth every 6 hours as needed for anxiety         ALLERGIES:     Allergies   Allergen Reactions     Lisinopril Hives and Swelling     Venlafaxine Hives and Swelling     Possibly allergy       PAST MEDICAL HISTORY:  Past Medical History:   Diagnosis Date     Cancer (H)     breast     Cardiomyopathy (H)        PAST SURGICAL HISTORY:  Past Surgical History:   Procedure Laterality Date     BREAST SURGERY      March 2006       SOCIAL HISTORY:  Social History     Socioeconomic History     Marital status:      Spouse name: None     Number of children: None     Years of education: None     Highest education level: None   Social Needs     Financial resource strain: None     Food insecurity - worry: None     Food insecurity - inability: None     Transportation needs - medical: None     Transportation needs - non-medical: None   Occupational History     None   Tobacco Use     Smoking status: Never Smoker     Smokeless tobacco: Never Used   Substance and Sexual Activity     Alcohol use: No     Alcohol/week: 0.0 oz     Drug use: No     Sexual activity: Yes     Partners: Male   Other Topics Concern     Parent/sibling w/ CABG, MI or angioplasty before 65F 55M? Not Asked   Social History Narrative     None       FAMILY HISTORY:  Family History   Problem  Relation Age of Onset     Diabetes Mother      Breast Cancer Mother      Ovarian Cancer Mother      Hypertension Father      Breast Cancer Sister        Review of Systems:  Skin:  Negative       Eyes:  Negative      ENT:  Negative      Respiratory:  Negative       Cardiovascular:  Negative      Gastroenterology: Negative      Genitourinary:  Negative      Musculoskeletal:  Positive for joint pain of the hands  Neurologic:  Positive for numbness or tingling of hands;numbness or tingling of feet of the fingers and toes  Psychiatric:  Negative      Heme/Lymph/Imm:  Negative      Endocrine:  Negative         Reviewed. Remainder of the note dictated.    Melisa Billingsley PA-C        Service Date: 01/15/2019      HISTORY OF PRESENT ILLNESS:  Ms. Guzman is a pleasant 63-year-old female who presents to the office today to follow up after a recent echocardiogram.      Again, she is followed in our clinic from a Cardio-Oncology perspective due to history of a chemo-induced cardiomyopathy and right breast carcinoma with ongoing Herceptin use.      She was initially diagnosed with a right breast carcinoma in 2006.  At that time she underwent lumpectomy and AC chemotherapy as well as adjuvant radiation.  She was started on tamoxifen at that time and after 2 years was transitioned to Aromasin.  She completed this treatment in 07/2013.  Unfortunately, in late 01/2017 she was again diagnosed with invasive ductal carcinoma of the right breast.  She was found to have evidence of metastatic disease (I believe in her liver).  As part of her workup, she underwent an echocardiogram which showed mildly reduced LV systolic function with an EF of 47%.  She was referred to the Cardiology office at that time.  She was set up for a stress cardiac MRI, which showed normal LV size.  She did have mild to moderately reduced LV systolic function with an EF of 43%.  She was noted to have mitral valve prolapse with mild mitral regurgitation.   Stress imaging did not show any evidence of ischemia.  At that time she was started on carvedilol and lisinopril.  Her cancer was treated with 4 cycles of TC chemotherapy and she was started on Herceptin on an every 3-week basis.  She has had a great response to her treatment and therefore she continues on long-term Herceptin every 3 weeks.  Fortunately, thus far followup cardiac MRIs and echocardiograms have shown slight improvement/stability with her ongoing therapy.  Unfortunately, in the late summer/early fall of 2018 she developed severe facial swelling.  It was felt that she had an episode of angioedema secondary to her lisinopril.  This was discontinued.  She did have a followup echocardiogram which showed stable findings.  I met her in October for followup.  After discussing her case with Dr. Spencer, we decided to continue her management with carvedilol alone unless we see a change in her echocardiogram findings.      From a cardiac standpoint, she states she has been doing great.  She has not had any chest discomfort, shortness of breath.  She denies any lower extremity edema or palpitations.  She does tell me that in December and early January she again had some facial swelling.  It was felt she may be having a reaction to the venlafaxine and this was discontinued.      CURRENT CARDIAC MEDICATIONS:   1.  Carvedilol 25 mg b.i.d.      The remainder of her medications, allergies and review of systems were reviewed and as are documented separately.      PHYSICAL EXAMINATION:   GENERAL:  The patient is a pleasant 63-year-old female who appears her stated age.  She is in no apparent distress.   VITAL SIGNS:  Her blood pressure is 114/78, pulse 72, weight is 165 pounds.  Breathing is nonlabored.   LUNGS:  Clear to auscultation.   CARDIAC:  Reveals a regular rate and rhythm, no murmurs appreciated.   NEUROLOGIC:  Alert and oriented.      We reviewed the results of her echocardiogram performed yesterday.  This showed  mildly reduced LV systolic function with an EF of 50-55%.  The reader felt that there was mild global hypokinesia of the left ventricle.  She was noted to have mild mitral regurgitation and mild tricuspid regurgitation.  Overall, the reader felt that there was no significant change from her last echo in 10/2018.      ASSESSMENT AND PLAN:  The patient is a pleasant 63-year-old female with a history of recurrent metastatic breast cancer.  She previously underwent treatment with Adriamycin in  and it was felt that she likely developed a mild chemo-induced cardiomyopathy.  She was started on appropriate medical therapy and her EF improved.  She is now getting Herceptin and her echo findings have remained stable.  Unfortunately, lisinopril had to be discontinued due to angioedema, but fortunately her echocardiograms have remained stable on carvedilol alone.      We will continue to follow her with echocardiography every 3 months.  Of course, if we see any significant changes we will likely want to consider repeating a cardiac MRI.  We may also consider trialing a low dose of an ARB if her EF is dropping.      I will have her come back and see me in the office after her next echocardiogram.  Of course, I encouraged her to contact us sooner with questions or concerns.         MELISA MCDONNELL PA-C             D: 01/15/2019   T: 01/15/2019   MT: ASAEL      Name:     KIMMIE PRICE   MRN:      [id number]        Account:      [id number]   :      1955           Service Date: 01/15/2019      Document: [id number]        Thank you for allowing me to participate in the care of your patient.      Sincerely,     Melisa Mcdonnell PA-C     [facility]    cc:   Mir Lindo MD  [facility]  [address] [place] [address]

## 2019-01-15 NOTE — PROGRESS NOTES
Service Date: 01/15/2019      HISTORY OF PRESENT ILLNESS:  Ms. Guzman is a pleasant 63-year-old female who presents to the office today to follow up after a recent echocardiogram.      Again, she is followed in our clinic from a Cardio-Oncology perspective due to history of a chemo-induced cardiomyopathy and right breast carcinoma with ongoing Herceptin use.      She was initially diagnosed with a right breast carcinoma in 2006.  At that time she underwent lumpectomy and AC chemotherapy as well as adjuvant radiation.  She was started on tamoxifen at that time and after 2 years was transitioned to Aromasin.  She completed this treatment in 07/2013.  Unfortunately, in late 01/2017 she was again diagnosed with invasive ductal carcinoma of the right breast.  She was found to have evidence of metastatic disease (I believe in her liver).  As part of her workup, she underwent an echocardiogram which showed mildly reduced LV systolic function with an EF of 47%.  She was referred to the Cardiology office at that time.  She was set up for a stress cardiac MRI, which showed normal LV size.  She did have mild to moderately reduced LV systolic function with an EF of 43%.  She was noted to have mitral valve prolapse with mild mitral regurgitation.  Stress imaging did not show any evidence of ischemia.  At that time she was started on carvedilol and lisinopril.  Her cancer was treated with 4 cycles of TC chemotherapy and she was started on Herceptin on an every 3-week basis.  She has had a great response to her treatment and therefore she continues on long-term Herceptin every 3 weeks.  Fortunately, thus far followup cardiac MRIs and echocardiograms have shown slight improvement/stability with her ongoing therapy.  Unfortunately, in the late summer/early fall of 2018 she developed severe facial swelling.  It was felt that she had an episode of angioedema secondary to her lisinopril.  This was discontinued.  She did have a  followup echocardiogram which showed stable findings.  I met her in October for followup.  After discussing her case with Dr. Spencer, we decided to continue her management with carvedilol alone unless we see a change in her echocardiogram findings.      From a cardiac standpoint, she states she has been doing great.  She has not had any chest discomfort, shortness of breath.  She denies any lower extremity edema or palpitations.  She does tell me that in December and early January she again had some facial swelling.  It was felt she may be having a reaction to the venlafaxine and this was discontinued.      CURRENT CARDIAC MEDICATIONS:   1.  Carvedilol 25 mg b.i.d.      The remainder of her medications, allergies and review of systems were reviewed and as are documented separately.      PHYSICAL EXAMINATION:   GENERAL:  The patient is a pleasant 63-year-old female who appears her stated age.  She is in no apparent distress.   VITAL SIGNS:  Her blood pressure is 114/78, pulse 72, weight is 165 pounds.  Breathing is nonlabored.   LUNGS:  Clear to auscultation.   CARDIAC:  Reveals a regular rate and rhythm, no murmurs appreciated.   NEUROLOGIC:  Alert and oriented.      We reviewed the results of her echocardiogram performed yesterday.  This showed mildly reduced LV systolic function with an EF of 50-55%.  The reader felt that there was mild global hypokinesia of the left ventricle.  She was noted to have mild mitral regurgitation and mild tricuspid regurgitation.  Overall, the reader felt that there was no significant change from her last echo in 10/2018.      ASSESSMENT AND PLAN:  The patient is a pleasant 63-year-old female with a history of recurrent metastatic breast cancer.  She previously underwent treatment with Adriamycin in 2006 and it was felt that she likely developed a mild chemo-induced cardiomyopathy.  She was started on appropriate medical therapy and her EF improved.  She is now getting Herceptin and her  echo findings have remained stable.  Unfortunately, lisinopril had to be discontinued due to angioedema, but fortunately her echocardiograms have remained stable on carvedilol alone.      We will continue to follow her with echocardiography every 3 months.  Of course, if we see any significant changes we will likely want to consider repeating a cardiac MRI.  We may also consider trialing a low dose of an ARB if her EF is dropping.      I will have her come back and see me in the office after her next echocardiogram.  Of course, I encouraged her to contact us sooner with questions or concerns.         ANGEL MCDONNELL PA-C             D: 01/15/2019   T: 01/15/2019   MT: ASAEL      Name:     KIMMIE PRICE   MRN:      [id number]        Account:      [id number]   :      1955           Service Date: 01/15/2019      Document: [id number]

## 2019-01-15 NOTE — LETTER
1/15/2019      Mir Lindo MD  [facility] [address]  [facility] [address]      RE: Lorna Guzman       Dear Colleague,    I had the pleasure of seeing Lorna Guzman in the [facility].    Service Date: 01/15/2019      HISTORY OF PRESENT ILLNESS:  Ms. Guzman is a pleasant 63-year-old female who presents to the office today to follow up after a recent echocardiogram.      Again, she is followed in our clinic from a Cardio-Oncology perspective due to history of a chemo-induced cardiomyopathy and right breast carcinoma with ongoing Herceptin use.      She was initially diagnosed with a right breast carcinoma in 2006.  At that time she underwent lumpectomy and AC chemotherapy as well as adjuvant radiation.  She was started on tamoxifen at that time and after 2 years was transitioned to Aromasin.  She completed this treatment in 07/2013.  Unfortunately, in late 01/2017 she was again diagnosed with invasive ductal carcinoma of the right breast.  She was found to have evidence of metastatic disease (I believe in her liver).  As part of her workup, she underwent an echocardiogram which showed mildly reduced LV systolic function with an EF of 47%.  She was referred to the Cardiology office at that time.  She was set up for a stress cardiac MRI, which showed normal LV size.  She did have mild to moderately reduced LV systolic function with an EF of 43%.  She was noted to have mitral valve prolapse with mild mitral regurgitation.  Stress imaging did not show any evidence of ischemia.  At that time she was started on carvedilol and lisinopril.  Her cancer was treated with 4 cycles of TC chemotherapy and she was started on Herceptin on an every 3-week basis.  She has had a great response to her treatment and therefore she continues on long-term Herceptin every 3 weeks.  Fortunately, thus far followup cardiac MRIs and echocardiograms have shown slight  improvement/stability with her ongoing therapy.  Unfortunately, in the late summer/early fall of 2018 she developed severe facial swelling.  It was felt that she had an episode of angioedema secondary to her lisinopril.  This was discontinued.  She did have a followup echocardiogram which showed stable findings.  I met her in October for followup.  After discussing her case with Dr. Spencer, we decided to continue her management with carvedilol alone unless we see a change in her echocardiogram findings.      From a cardiac standpoint, she states she has been doing great.  She has not had any chest discomfort, shortness of breath.  She denies any lower extremity edema or palpitations.  She does tell me that in December and early January she again had some facial swelling.  It was felt she may be having a reaction to the venlafaxine and this was discontinued.      CURRENT CARDIAC MEDICATIONS:   1.  Carvedilol 25 mg b.i.d.      The remainder of her medications, allergies and review of systems were reviewed and as are documented separately.      PHYSICAL EXAMINATION:   GENERAL:  The patient is a pleasant 63-year-old female who appears her stated age.  She is in no apparent distress.   VITAL SIGNS:  Her blood pressure is 114/78, pulse 72, weight is 165 pounds.  Breathing is nonlabored.   LUNGS:  Clear to auscultation.   CARDIAC:  Reveals a regular rate and rhythm, no murmurs appreciated.   NEUROLOGIC:  Alert and oriented.      We reviewed the results of her echocardiogram performed yesterday.  This showed mildly reduced LV systolic function with an EF of 50-55%.  The reader felt that there was mild global hypokinesia of the left ventricle.  She was noted to have mild mitral regurgitation and mild tricuspid regurgitation.  Overall, the reader felt that there was no significant change from her last echo in 10/2018.      ASSESSMENT AND PLAN:  The patient is a pleasant 63-year-old female with a history of recurrent metastatic  breast cancer.  She previously underwent treatment with Adriamycin in  and it was felt that she likely developed a mild chemo-induced cardiomyopathy.  She was started on appropriate medical therapy and her EF improved.  She is now getting Herceptin and her echo findings have remained stable.  Unfortunately, lisinopril had to be discontinued due to angioedema, but fortunately her echocardiograms have remained stable on carvedilol alone.      We will continue to follow her with echocardiography every 3 months.  Of course, if we see any significant changes we will likely want to consider repeating a cardiac MRI.  We may also consider trialing a low dose of an ARB if her EF is dropping.      I will have her come back and see me in the office after her next echocardiogram.  Of course, I encouraged her to contact us sooner with questions or concerns.         ANGEL MCDONNELL PA-C             D: 01/15/2019   T: 01/15/2019   MT: ASAEL      Name:     KIMMIE PRICE   MRN:      [id number]        Account:      [id number]   :      1955           Service Date: 01/15/2019      Document: [id number]           Outpatient Encounter Medications as of 1/15/2019   Medication Sig Dispense Refill     carvedilol (COREG) 25 MG tablet Take 1 tablet (25 mg) by mouth 2 times daily (with meals) 14 tablet 0     Multiple Vitamins-Minerals (MULTIVITAMIN PO) Take by mouth daily       TRASTUZUMAB IV Chemo agent       LORazepam (ATIVAN) 0.5 MG tablet Take 0.5 mg by mouth every 6 hours as needed for anxiety       [DISCONTINUED] venlafaxine (EFFEXOR-XR) 37.5 MG 24 hr capsule Take 37.5 mg by mouth daily       No facility-administered encounter medications on file as of 1/15/2019.        Again, thank you for allowing me to participate in the care of your patient.      Sincerely,    Angel Mcdonnell PA-C     [facility]

## 2019-01-15 NOTE — PATIENT INSTRUCTIONS
Thank you for your [facility] visit today. Your provider has recommended the following:  Medication Changes:  No changes  Recommendations:  Echo in 3 months  Follow-up:  See Melisa JOHNSON for cardiology follow up in 3 months.    Reminder:  Please bring in your current medication list or your medication, over the counter supplements and vitamin bottles as we will review these at each office visit.

## 2019-01-15 NOTE — PROGRESS NOTES
Please see separate dictation for HPI, PHYSICAL EXAM AND IMPRESSION/PLAN.    CURRENT MEDICATIONS:  Current Outpatient Medications   Medication Sig Dispense Refill     carvedilol (COREG) 25 MG tablet Take 1 tablet (25 mg) by mouth 2 times daily (with meals) 14 tablet 0     Multiple Vitamins-Minerals (MULTIVITAMIN PO) Take by mouth daily       TRASTUZUMAB IV Chemo agent       LORazepam (ATIVAN) 0.5 MG tablet Take 0.5 mg by mouth every 6 hours as needed for anxiety         ALLERGIES:     Allergies   Allergen Reactions     Lisinopril Hives and Swelling     Venlafaxine Hives and Swelling     Possibly allergy       PAST MEDICAL HISTORY:  Past Medical History:   Diagnosis Date     Cancer (H)     breast     Cardiomyopathy (H)        PAST SURGICAL HISTORY:  Past Surgical History:   Procedure Laterality Date     BREAST SURGERY      March 2006       SOCIAL HISTORY:  Social History     Socioeconomic History     Marital status:      Spouse name: None     Number of children: None     Years of education: None     Highest education level: None   Social Needs     Financial resource strain: None     Food insecurity - worry: None     Food insecurity - inability: None     Transportation needs - medical: None     Transportation needs - non-medical: None   Occupational History     None   Tobacco Use     Smoking status: Never Smoker     Smokeless tobacco: Never Used   Substance and Sexual Activity     Alcohol use: No     Alcohol/week: 0.0 oz     Drug use: No     Sexual activity: Yes     Partners: Male   Other Topics Concern     Parent/sibling w/ CABG, MI or angioplasty before 65F 55M? Not Asked   Social History Narrative     None       FAMILY HISTORY:  Family History   Problem Relation Age of Onset     Diabetes Mother      Breast Cancer Mother      Ovarian Cancer Mother      Hypertension Father      Breast Cancer Sister        Review of Systems:  Skin:  Negative       Eyes:  Negative      ENT:  Negative      Respiratory:  Negative        Cardiovascular:  Negative      Gastroenterology: Negative      Genitourinary:  Negative      Musculoskeletal:  Positive for joint pain of the hands  Neurologic:  Positive for numbness or tingling of hands;numbness or tingling of feet of the fingers and toes  Psychiatric:  Negative      Heme/Lymph/Imm:  Negative      Endocrine:  Negative         Reviewed. Remainder of the note dictated.    Melisa Billingsley PA-C

## 2019-03-14 ENCOUNTER — TRANSFERRED RECORDS (OUTPATIENT)
Dept: HEALTH INFORMATION MANAGEMENT | Facility: CLINIC | Age: 64
End: 2019-03-14

## 2019-03-18 ENCOUNTER — DOCUMENTATION ONLY (OUTPATIENT)
Dept: CARDIOLOGY | Facility: CLINIC | Age: 64
End: 2019-03-18

## 2019-04-09 ENCOUNTER — HOSPITAL ENCOUNTER (OUTPATIENT)
Dept: CARDIOLOGY | Facility: CLINIC | Age: 64
Discharge: HOME | End: 2019-04-09
Attending: PHYSICIAN ASSISTANT | Admitting: PHYSICIAN ASSISTANT
Payer: COMMERCIAL

## 2019-04-09 ENCOUNTER — OFFICE VISIT (OUTPATIENT)
Dept: CARDIOLOGY | Facility: CLINIC | Age: 64
End: 2019-04-09
Attending: PHYSICIAN ASSISTANT
Payer: COMMERCIAL

## 2019-04-09 VITALS
WEIGHT: 165.1 LBS | HEART RATE: 73 BPM | DIASTOLIC BLOOD PRESSURE: 52 MMHG | SYSTOLIC BLOOD PRESSURE: 120 MMHG | OXYGEN SATURATION: 98 % | BODY MASS INDEX: 24.45 KG/M2 | HEIGHT: 69 IN

## 2019-04-09 DIAGNOSIS — I42.9 SECONDARY CARDIOMYOPATHY (H): ICD-10-CM

## 2019-04-09 DIAGNOSIS — I42.9 SECONDARY CARDIOMYOPATHY (H): Primary | ICD-10-CM

## 2019-04-09 PROCEDURE — 99213 OFFICE O/P EST LOW 20 MIN: CPT | Mod: 25 | Performed by: PHYSICIAN ASSISTANT

## 2019-04-09 PROCEDURE — 93308 TTE F-UP OR LMTD: CPT | Mod: 26 | Performed by: INTERNAL MEDICINE

## 2019-04-09 PROCEDURE — 93308 TTE F-UP OR LMTD: CPT

## 2019-04-09 PROCEDURE — 93325 DOPPLER ECHO COLOR FLOW MAPG: CPT | Mod: 26 | Performed by: INTERNAL MEDICINE

## 2019-04-09 PROCEDURE — 93321 DOPPLER ECHO F-UP/LMTD STD: CPT | Mod: 26 | Performed by: INTERNAL MEDICINE

## 2019-04-09 RX ORDER — CARVEDILOL 25 MG/1
25 TABLET ORAL 2 TIMES DAILY WITH MEALS
Qty: 180 TABLET | Refills: 3 | Status: SHIPPED | OUTPATIENT
Start: 2019-04-09 | End: 2020-04-07

## 2019-04-09 ASSESSMENT — MIFFLIN-ST. JEOR: SCORE: 1378.52

## 2019-04-09 NOTE — LETTER
4/9/2019    Mir Lindo MD  [facility] [address] [facility] [address]  [facility] [address]    RE: Lorna A Thomas       Dear Colleague,    I had the pleasure of seeing Lorna Guzman in the [facility].    Please see separate dictation for HPI, PHYSICAL EXAM AND IMPRESSION/PLAN.    CURRENT MEDICATIONS:  Current Outpatient Medications   Medication Sig Dispense Refill     carvedilol (COREG) 25 MG tablet Take 1 tablet (25 mg) by mouth 2 times daily (with meals) 14 tablet 0     Multiple Vitamins-Minerals (MULTIVITAMIN PO) Take by mouth daily       TRASTUZUMAB IV Chemo agent       LORazepam (ATIVAN) 0.5 MG tablet Take 0.5 mg by mouth every 6 hours as needed for anxiety         ALLERGIES:     Allergies   Allergen Reactions     Lisinopril Hives and Swelling     Venlafaxine Hives and Swelling     Possibly allergy       PAST MEDICAL HISTORY:  Past Medical History:   Diagnosis Date     Cancer (H)     breast     Cardiomyopathy (H)        PAST SURGICAL HISTORY:  Past Surgical History:   Procedure Laterality Date     BREAST SURGERY      March 2006       SOCIAL HISTORY:  Social History     Socioeconomic History     Marital status:      Spouse name: None     Number of children: None     Years of education: None     Highest education level: None   Occupational History     None   Social Needs     Financial resource strain: None     Food insecurity:     Worry: None     Inability: None     Transportation needs:     Medical: None     Non-medical: None   Tobacco Use     Smoking status: Never Smoker     Smokeless tobacco: Never Used   Substance and Sexual Activity     Alcohol use: No     Alcohol/week: 0.0 oz     Drug use: No     Sexual activity: Yes     Partners: Male   Lifestyle     Physical activity:     Days per week: None     Minutes per session: None     Stress: None   Relationships     Social connections:     Talks on phone: None     Gets together: None     Attends [employer]  service: None     Active member of club or organization: None     Attends meetings of clubs or organizations: None     Relationship status: None     Intimate partner violence:     Fear of current or ex partner: None     Emotionally abused: None     Physically abused: None     Forced sexual activity: None   Other Topics Concern     Parent/sibling w/ CABG, MI or angioplasty before 65F 55M? Not Asked   Social History Narrative     None       FAMILY HISTORY:  Family History   Problem Relation Age of Onset     Diabetes Mother      Breast Cancer Mother      Ovarian Cancer Mother      Hypertension Father      Breast Cancer Sister        Review of Systems:  Skin:  Negative       Eyes:  Negative      ENT:  Negative      Respiratory:  Negative       Cardiovascular:  Negative for;palpitations;chest pain;fatigue;lightheadedness;dizziness      Gastroenterology: Negative      Genitourinary:  Negative      Musculoskeletal:  Negative      Neurologic:  Negative      Psychiatric:  Negative      Heme/Lymph/Imm:  Negative      Endocrine:  Negative         Reviewed. Remainder of the note dictated.    Melisa Billingsley PA-C    Service Date: 4/9/2019       HISTORY OF PRESENT ILLNESS:  Ms. Guzman is a pleasant 63-year-old female who presents to the office today to follow up after a recent echocardiogram.      Again, she is followed in our clinic from a Cardio-Oncology perspective due to history of a chemo-induced cardiomyopathy and right breast carcinoma with ongoing Herceptin use.      She was initially diagnosed with a right breast carcinoma in 2006.  At that time she underwent lumpectomy and AC chemotherapy as well as adjuvant radiation.  She was started on tamoxifen at that time and after 2 years was transitioned to Aromasin.  She completed this treatment in 07/2013.  Unfortunately, in late 01/2017 she was again diagnosed with invasive ductal carcinoma of the right breast.  She was found to have evidence of metastatic disease  (in the liver).  As part of her workup, she underwent an echocardiogram which showed mildly reduced LV systolic function with an EF of 47%.  She was referred to the Cardiology office at that time.  She was set up for a stress cardiac MRI, which showed normal LV size.  She did have mild to moderately reduced LV systolic function with an EF of 43%.  She was noted to have mitral valve prolapse with mild mitral regurgitation.  Stress imaging did not show any evidence of ischemia.  At that time she was started on carvedilol and lisinopril.  Her cancer was treated with 4 cycles of TC chemotherapy and she was started on Herceptin on an every 3-week basis.  She has had a great response to her treatment and therefore she continues on long-term Herceptin every 3 weeks.  Fortunately, thus far followup cardiac MRIs and echocardiograms have shown slight improvement/stability with her ongoing therapy.  Unfortunately, in the late summer/early fall of 2018 she developed severe facial swelling.  It was felt that she had an episode of angioedema secondary to her lisinopril.  This was discontinued.  She did have a followup echocardiogram which showed stable findings.  I met her in October 2018 for followup.  After discussing her case with Dr. Spencer, we decided to continue her management with carvedilol alone unless we see a change in her echocardiogram findings.      She continues to do well from a cardiac standpoint.  She has not had any chest discomfort, shortness of breath.  She denies any lower extremity edema or palpitations.  No further facial swelling since venlafaxine was discontinued in January. She continues to see Dr Lindo regularly and will have a repeat PET scan in June.     CURRENT CARDIAC MEDICATIONS:   1.  Carvedilol 25 mg b.i.d.      The remainder of her medications, allergies and review of systems were reviewed and as are documented separately.      PHYSICAL EXAMINATION:   GENERAL:  The patient is a pleasant 63-year-old  "female who appears her stated age.  She is in no apparent distress.   VITAL SIGNS:  /52 (BP Location: Right arm, Patient Position: Sitting, Cuff Size: Adult Regular)   Pulse 73   Ht 1.753 m (5' 9.02\")   Wt 74.9 kg (165 lb 1.6 oz)   SpO2 98%   BMI 24.37 kg/m     LUNGS: Breathing is nonlabored. Clear to auscultation.   CARDIAC:  Reveals a regular rate and rhythm, no murmurs appreciated.   NEUROLOGIC:  Alert and oriented.      We reviewed the results of her echocardiogram performed earlier today.  This showed mildly reduced LV systolic function with an EF of 53%.  GLS was -20 (normal).  She was noted to have mild-moderate mitral regurgitation and tricuspid regurgitation.  Overall, no significant change from her last echo in 1/2019.      ASSESSMENT AND PLAN:  The patient is a pleasant 63-year-old female with a history of recurrent metastatic breast cancer.  She previously underwent treatment with Adriamycin in 2006 and it was felt that she likely developed a mild chemo-induced cardiomyopathy.  She was started on appropriate medical therapy and her EF improved.  She is now getting Herceptin and her echo findings have remained stable.  Unfortunately, lisinopril had to be discontinued due to angioedema, but fortunately her echocardiograms have remained stable on carvedilol alone.      We will continue to follow her with echocardiography every 3 months.  Of course, if we see any significant changes we will likely want to consider repeating a cardiac MRI.  We may also consider trialing a low dose of an ARB if her EF is dropping.      I will have her come back and see me in the office after her next echocardiogram in 3 months.  Of course, I encouraged her to contact us sooner with questions or concerns.       Thank you for allowing me to participate in the care of your patient.    Sincerely,     Melisa Billingsley PA-C     [facility]    "

## 2019-04-09 NOTE — PROGRESS NOTES
Please see separate dictation for HPI, PHYSICAL EXAM AND IMPRESSION/PLAN.    CURRENT MEDICATIONS:  Current Outpatient Medications   Medication Sig Dispense Refill     carvedilol (COREG) 25 MG tablet Take 1 tablet (25 mg) by mouth 2 times daily (with meals) 14 tablet 0     Multiple Vitamins-Minerals (MULTIVITAMIN PO) Take by mouth daily       TRASTUZUMAB IV Chemo agent       LORazepam (ATIVAN) 0.5 MG tablet Take 0.5 mg by mouth every 6 hours as needed for anxiety         ALLERGIES:     Allergies   Allergen Reactions     Lisinopril Hives and Swelling     Venlafaxine Hives and Swelling     Possibly allergy       PAST MEDICAL HISTORY:  Past Medical History:   Diagnosis Date     Cancer (H)     breast     Cardiomyopathy (H)        PAST SURGICAL HISTORY:  Past Surgical History:   Procedure Laterality Date     BREAST SURGERY      March 2006       SOCIAL HISTORY:  Social History     Socioeconomic History     Marital status:      Spouse name: None     Number of children: None     Years of education: None     Highest education level: None   Occupational History     None   Social Needs     Financial resource strain: None     Food insecurity:     Worry: None     Inability: None     Transportation needs:     Medical: None     Non-medical: None   Tobacco Use     Smoking status: Never Smoker     Smokeless tobacco: Never Used   Substance and Sexual Activity     Alcohol use: No     Alcohol/week: 0.0 oz     Drug use: No     Sexual activity: Yes     Partners: Male   Lifestyle     Physical activity:     Days per week: None     Minutes per session: None     Stress: None   Relationships     Social connections:     Talks on phone: None     Gets together: None     Attends [employer] service: None     Active member of club or organization: None     Attends meetings of clubs or organizations: None     Relationship status: None     Intimate partner violence:     Fear of current or ex partner: None     Emotionally abused: None      Physically abused: None     Forced sexual activity: None   Other Topics Concern     Parent/sibling w/ CABG, MI or angioplasty before 65F 55M? Not Asked   Social History Narrative     None       FAMILY HISTORY:  Family History   Problem Relation Age of Onset     Diabetes Mother      Breast Cancer Mother      Ovarian Cancer Mother      Hypertension Father      Breast Cancer Sister        Review of Systems:  Skin:  Negative       Eyes:  Negative      ENT:  Negative      Respiratory:  Negative       Cardiovascular:  Negative for;palpitations;chest pain;fatigue;lightheadedness;dizziness      Gastroenterology: Negative      Genitourinary:  Negative      Musculoskeletal:  Negative      Neurologic:  Negative      Psychiatric:  Negative      Heme/Lymph/Imm:  Negative      Endocrine:  Negative         Reviewed. Remainder of the note dictated.    Melisa Billingsley PA-C

## 2019-04-09 NOTE — PROGRESS NOTES
Service Date: 4/9/2019       HISTORY OF PRESENT ILLNESS:  Ms. Guzman is a pleasant 63-year-old female who presents to the office today to follow up after a recent echocardiogram.      Again, she is followed in our clinic from a Cardio-Oncology perspective due to history of a chemo-induced cardiomyopathy and right breast carcinoma with ongoing Herceptin use.      She was initially diagnosed with a right breast carcinoma in 2006.  At that time she underwent lumpectomy and AC chemotherapy as well as adjuvant radiation.  She was started on tamoxifen at that time and after 2 years was transitioned to Aromasin.  She completed this treatment in 07/2013.  Unfortunately, in late 01/2017 she was again diagnosed with invasive ductal carcinoma of the right breast.  She was found to have evidence of metastatic disease (in the liver).  As part of her workup, she underwent an echocardiogram which showed mildly reduced LV systolic function with an EF of 47%.  She was referred to the Cardiology office at that time.  She was set up for a stress cardiac MRI, which showed normal LV size.  She did have mild to moderately reduced LV systolic function with an EF of 43%.  She was noted to have mitral valve prolapse with mild mitral regurgitation.  Stress imaging did not show any evidence of ischemia.  At that time she was started on carvedilol and lisinopril.  Her cancer was treated with 4 cycles of TC chemotherapy and she was started on Herceptin on an every 3-week basis.  She has had a great response to her treatment and therefore she continues on long-term Herceptin every 3 weeks.  Fortunately, thus far followup cardiac MRIs and echocardiograms have shown slight improvement/stability with her ongoing therapy.  Unfortunately, in the late summer/early fall of 2018 she developed severe facial swelling.  It was felt that she had an episode of angioedema secondary to her lisinopril.  This was discontinued.  She did have a followup  "echocardiogram which showed stable findings.  I met her in October 2018 for followup.  After discussing her case with Dr. Spencer, we decided to continue her management with carvedilol alone unless we see a change in her echocardiogram findings.      She continues to do well from a cardiac standpoint.  She has not had any chest discomfort, shortness of breath.  She denies any lower extremity edema or palpitations.  No further facial swelling since venlafaxine was discontinued in January. She continues to see Dr Lindo regularly and will have a repeat PET scan in June.     CURRENT CARDIAC MEDICATIONS:   1.  Carvedilol 25 mg b.i.d.      The remainder of her medications, allergies and review of systems were reviewed and as are documented separately.      PHYSICAL EXAMINATION:   GENERAL:  The patient is a pleasant 63-year-old female who appears her stated age.  She is in no apparent distress.   VITAL SIGNS:  /52 (BP Location: Right arm, Patient Position: Sitting, Cuff Size: Adult Regular)   Pulse 73   Ht 1.753 m (5' 9.02\")   Wt 74.9 kg (165 lb 1.6 oz)   SpO2 98%   BMI 24.37 kg/m    LUNGS: Breathing is nonlabored. Clear to auscultation.   CARDIAC:  Reveals a regular rate and rhythm, no murmurs appreciated.   NEUROLOGIC:  Alert and oriented.      We reviewed the results of her echocardiogram performed earlier today.  This showed mildly reduced LV systolic function with an EF of 53%.  GLS was -20 (normal).  She was noted to have mild-moderate mitral regurgitation and tricuspid regurgitation.  Overall, no significant change from her last echo in 1/2019.      ASSESSMENT AND PLAN:  The patient is a pleasant 63-year-old female with a history of recurrent metastatic breast cancer.  She previously underwent treatment with Adriamycin in 2006 and it was felt that she likely developed a mild chemo-induced cardiomyopathy.  She was started on appropriate medical therapy and her EF improved.  She is now getting Herceptin and her " echo findings have remained stable.  Unfortunately, lisinopril had to be discontinued due to angioedema, but fortunately her echocardiograms have remained stable on carvedilol alone.      We will continue to follow her with echocardiography every 3 months.  Of course, if we see any significant changes we will likely want to consider repeating a cardiac MRI.  We may also consider trialing a low dose of an ARB if her EF is dropping.      I will have her come back and see me in the office after her next echocardiogram in 3 months.  Of course, I encouraged her to contact us sooner with questions or concerns.         ANGEL MCDONNELL PA-C

## 2019-04-09 NOTE — LETTER
4/9/2019    Mir Lindo MD  [facility] [address] [facility] [address]  [facility] [address]    RE: Lorna A Thomas       Dear Colleague,    I had the pleasure of seeing Lorna Guzman in the [facility].    Please see separate dictation for HPI, PHYSICAL EXAM AND IMPRESSION/PLAN.    CURRENT MEDICATIONS:  Current Outpatient Medications   Medication Sig Dispense Refill     carvedilol (COREG) 25 MG tablet Take 1 tablet (25 mg) by mouth 2 times daily (with meals) 14 tablet 0     Multiple Vitamins-Minerals (MULTIVITAMIN PO) Take by mouth daily       TRASTUZUMAB IV Chemo agent       LORazepam (ATIVAN) 0.5 MG tablet Take 0.5 mg by mouth every 6 hours as needed for anxiety         ALLERGIES:     Allergies   Allergen Reactions     Lisinopril Hives and Swelling     Venlafaxine Hives and Swelling     Possibly allergy       PAST MEDICAL HISTORY:  Past Medical History:   Diagnosis Date     Cancer (H)     breast     Cardiomyopathy (H)        PAST SURGICAL HISTORY:  Past Surgical History:   Procedure Laterality Date     BREAST SURGERY      March 2006       SOCIAL HISTORY:  Social History     Socioeconomic History     Marital status:      Spouse name: None     Number of children: None     Years of education: None     Highest education level: None   Occupational History     None   Social Needs     Financial resource strain: None     Food insecurity:     Worry: None     Inability: None     Transportation needs:     Medical: None     Non-medical: None   Tobacco Use     Smoking status: Never Smoker     Smokeless tobacco: Never Used   Substance and Sexual Activity     Alcohol use: No     Alcohol/week: 0.0 oz     Drug use: No     Sexual activity: Yes     Partners: Male   Lifestyle     Physical activity:     Days per week: None     Minutes per session: None     Stress: None   Relationships     Social connections:     Talks on phone: None     Gets together: None     Attends Worship  service: None     Active member of club or organization: None     Attends meetings of clubs or organizations: None     Relationship status: None     Intimate partner violence:     Fear of current or ex partner: None     Emotionally abused: None     Physically abused: None     Forced sexual activity: None   Other Topics Concern     Parent/sibling w/ CABG, MI or angioplasty before 65F 55M? Not Asked   Social History Narrative     None       FAMILY HISTORY:  Family History   Problem Relation Age of Onset     Diabetes Mother      Breast Cancer Mother      Ovarian Cancer Mother      Hypertension Father      Breast Cancer Sister        Review of Systems:  Skin:  Negative       Eyes:  Negative      ENT:  Negative      Respiratory:  Negative       Cardiovascular:  Negative for;palpitations;chest pain;fatigue;lightheadedness;dizziness      Gastroenterology: Negative      Genitourinary:  Negative      Musculoskeletal:  Negative      Neurologic:  Negative      Psychiatric:  Negative      Heme/Lymph/Imm:  Negative      Endocrine:  Negative         Reviewed. Remainder of the note dictated.    Melisa Billingsley PA-C    Service Date: 4/9/2019       HISTORY OF PRESENT ILLNESS:  Ms. Guzman is a pleasant 63-year-old female who presents to the office today to follow up after a recent echocardiogram.      Again, she is followed in our clinic from a Cardio-Oncology perspective due to history of a chemo-induced cardiomyopathy and right breast carcinoma with ongoing Herceptin use.      She was initially diagnosed with a right breast carcinoma in 2006.  At that time she underwent lumpectomy and AC chemotherapy as well as adjuvant radiation.  She was started on tamoxifen at that time and after 2 years was transitioned to Aromasin.  She completed this treatment in 07/2013.  Unfortunately, in late 01/2017 she was again diagnosed with invasive ductal carcinoma of the right breast.  She was found to have evidence of metastatic disease  (in the liver).  As part of her workup, she underwent an echocardiogram which showed mildly reduced LV systolic function with an EF of 47%.  She was referred to the Cardiology office at that time.  She was set up for a stress cardiac MRI, which showed normal LV size.  She did have mild to moderately reduced LV systolic function with an EF of 43%.  She was noted to have mitral valve prolapse with mild mitral regurgitation.  Stress imaging did not show any evidence of ischemia.  At that time she was started on carvedilol and lisinopril.  Her cancer was treated with 4 cycles of TC chemotherapy and she was started on Herceptin on an every 3-week basis.  She has had a great response to her treatment and therefore she continues on long-term Herceptin every 3 weeks.  Fortunately, thus far followup cardiac MRIs and echocardiograms have shown slight improvement/stability with her ongoing therapy.  Unfortunately, in the late summer/early fall of 2018 she developed severe facial swelling.  It was felt that she had an episode of angioedema secondary to her lisinopril.  This was discontinued.  She did have a followup echocardiogram which showed stable findings.  I met her in October 2018 for followup.  After discussing her case with Dr. Spencer, we decided to continue her management with carvedilol alone unless we see a change in her echocardiogram findings.      She continues to do well from a cardiac standpoint.  She has not had any chest discomfort, shortness of breath.  She denies any lower extremity edema or palpitations.  No further facial swelling since venlafaxine was discontinued in January. She continues to see Dr Lindo regularly and will have a repeat PET scan in June.     CURRENT CARDIAC MEDICATIONS:   1.  Carvedilol 25 mg b.i.d.      The remainder of her medications, allergies and review of systems were reviewed and as are documented separately.      PHYSICAL EXAMINATION:   GENERAL:  The patient is a pleasant 63-year-old  "female who appears her stated age.  She is in no apparent distress.   VITAL SIGNS:  /52 (BP Location: Right arm, Patient Position: Sitting, Cuff Size: Adult Regular)   Pulse 73   Ht 1.753 m (5' 9.02\")   Wt 74.9 kg (165 lb 1.6 oz)   SpO2 98%   BMI 24.37 kg/m     LUNGS: Breathing is nonlabored. Clear to auscultation.   CARDIAC:  Reveals a regular rate and rhythm, no murmurs appreciated.   NEUROLOGIC:  Alert and oriented.      We reviewed the results of her echocardiogram performed earlier today.  This showed mildly reduced LV systolic function with an EF of 53%.  GLS was -20 (normal).  She was noted to have mild-moderate mitral regurgitation and tricuspid regurgitation.  Overall, no significant change from her last echo in 1/2019.      ASSESSMENT AND PLAN:  The patient is a pleasant 63-year-old female with a history of recurrent metastatic breast cancer.  She previously underwent treatment with Adriamycin in 2006 and it was felt that she likely developed a mild chemo-induced cardiomyopathy.  She was started on appropriate medical therapy and her EF improved.  She is now getting Herceptin and her echo findings have remained stable.  Unfortunately, lisinopril had to be discontinued due to angioedema, but fortunately her echocardiograms have remained stable on carvedilol alone.      We will continue to follow her with echocardiography every 3 months.  Of course, if we see any significant changes we will likely want to consider repeating a cardiac MRI.  We may also consider trialing a low dose of an ARB if her EF is dropping.      I will have her come back and see me in the office after her next echocardiogram in 3 months.  Of course, I encouraged her to contact us sooner with questions or concerns.         ANGEL MCDONNELL PA-C             Thank you for allowing me to participate in the care of your patient.      Sincerely,     Angel Mcdonnell PA-C     [facility]    cc:   Melisa Billingsley PA-C  [address] [place] [address]

## 2019-04-25 ENCOUNTER — TRANSFERRED RECORDS (OUTPATIENT)
Dept: HEALTH INFORMATION MANAGEMENT | Facility: CLINIC | Age: 64
End: 2019-04-25

## 2019-04-29 ENCOUNTER — DOCUMENTATION ONLY (OUTPATIENT)
Dept: CARDIOLOGY | Facility: CLINIC | Age: 64
End: 2019-04-29

## 2019-05-08 ENCOUNTER — HOSPITAL ENCOUNTER (OUTPATIENT)
Dept: CARDIOLOGY | Facility: CLINIC | Age: 64
Discharge: HOME | End: 2019-05-08
Attending: NURSE PRACTITIONER | Admitting: NURSE PRACTITIONER
Payer: COMMERCIAL

## 2019-05-08 DIAGNOSIS — C50.919 BREAST CANCER (H): ICD-10-CM

## 2019-05-08 PROCEDURE — 93005 ELECTROCARDIOGRAM TRACING: CPT

## 2019-05-08 PROCEDURE — 93010 ELECTROCARDIOGRAM REPORT: CPT | Performed by: INTERNAL MEDICINE

## 2019-05-10 LAB — INTERPRETATION ECG - MUSE: NORMAL

## 2019-05-30 ENCOUNTER — HOSPITAL ENCOUNTER (OUTPATIENT)
Dept: CARDIOLOGY | Facility: CLINIC | Age: 64
Discharge: HOME | End: 2019-05-30
Attending: NURSE PRACTITIONER | Admitting: NURSE PRACTITIONER
Payer: COMMERCIAL

## 2019-05-30 DIAGNOSIS — C50.919 BREAST CANCER (H): ICD-10-CM

## 2019-05-30 LAB — INTERPRETATION ECG - MUSE: NORMAL

## 2019-05-30 PROCEDURE — 93005 ELECTROCARDIOGRAM TRACING: CPT

## 2019-05-30 PROCEDURE — 93010 ELECTROCARDIOGRAM REPORT: CPT | Performed by: INTERNAL MEDICINE

## 2019-06-06 ENCOUNTER — TRANSFERRED RECORDS (OUTPATIENT)
Dept: HEALTH INFORMATION MANAGEMENT | Facility: CLINIC | Age: 64
End: 2019-06-06

## 2019-06-07 ENCOUNTER — DOCUMENTATION ONLY (OUTPATIENT)
Dept: CARDIOLOGY | Facility: CLINIC | Age: 64
End: 2019-06-07

## 2019-06-13 ENCOUNTER — HOSPITAL ENCOUNTER (OUTPATIENT)
Dept: CARDIOLOGY | Facility: CLINIC | Age: 64
Discharge: HOME | End: 2019-06-13
Attending: NURSE PRACTITIONER | Admitting: NURSE PRACTITIONER
Payer: COMMERCIAL

## 2019-06-13 DIAGNOSIS — C50.919 BREAST CANCER (H): ICD-10-CM

## 2019-06-13 PROCEDURE — 93010 ELECTROCARDIOGRAM REPORT: CPT | Performed by: INTERNAL MEDICINE

## 2019-06-13 PROCEDURE — 93005 ELECTROCARDIOGRAM TRACING: CPT

## 2019-06-14 LAB — INTERPRETATION ECG - MUSE: NORMAL

## 2019-07-01 ENCOUNTER — HOSPITAL ENCOUNTER (OUTPATIENT)
Dept: CARDIOLOGY | Facility: CLINIC | Age: 64
Discharge: HOME | End: 2019-07-01
Attending: PHYSICIAN ASSISTANT | Admitting: PHYSICIAN ASSISTANT
Payer: COMMERCIAL

## 2019-07-01 ENCOUNTER — OFFICE VISIT (OUTPATIENT)
Dept: CARDIOLOGY | Facility: CLINIC | Age: 64
End: 2019-07-01
Attending: PHYSICIAN ASSISTANT
Payer: COMMERCIAL

## 2019-07-01 VITALS
BODY MASS INDEX: 23.37 KG/M2 | WEIGHT: 157.8 LBS | SYSTOLIC BLOOD PRESSURE: 100 MMHG | HEART RATE: 72 BPM | DIASTOLIC BLOOD PRESSURE: 63 MMHG | HEIGHT: 69 IN

## 2019-07-01 DIAGNOSIS — I42.9 SECONDARY CARDIOMYOPATHY (H): ICD-10-CM

## 2019-07-01 DIAGNOSIS — I42.9 SECONDARY CARDIOMYOPATHY (H): Primary | ICD-10-CM

## 2019-07-01 PROCEDURE — 93308 TTE F-UP OR LMTD: CPT

## 2019-07-01 PROCEDURE — 93321 DOPPLER ECHO F-UP/LMTD STD: CPT | Mod: 26 | Performed by: INTERNAL MEDICINE

## 2019-07-01 PROCEDURE — 93325 DOPPLER ECHO COLOR FLOW MAPG: CPT | Mod: 26 | Performed by: INTERNAL MEDICINE

## 2019-07-01 PROCEDURE — 99213 OFFICE O/P EST LOW 20 MIN: CPT | Mod: 25 | Performed by: PHYSICIAN ASSISTANT

## 2019-07-01 PROCEDURE — 93308 TTE F-UP OR LMTD: CPT | Mod: 26 | Performed by: INTERNAL MEDICINE

## 2019-07-01 ASSESSMENT — MIFFLIN-ST. JEOR: SCORE: 1340.16

## 2019-07-01 NOTE — PROGRESS NOTES
Service Date: 07/01/2019      CARDIOLOGY CLINIC FOLLOWUP VISIT      HISTORY OF PRESENT ILLNESS:  Ms. Guzman is a pleasant 64-year-old female who presents to the office today for followup after recent echocardiogram.      Again, she is followed in our clinic from a Cardio-Oncology perspective due to history of a chemo-induced cardiomyopathy and right breast carcinoma with ongoing Herceptin use.  She was initially diagnosed with breast cancer in 2006.  At that time, she underwent a lumpectomy and AC chemo treatment as well as adjuvant radiation.  She was started on tamoxifen and after 2 years, was transitioned to Aromasin.  She completed this treatment in 07/2013.  Unfortunately, in late 2017, she was again diagnosed with invasive ductal carcinoma of the right breast.  She was also found to have evidence of metastatic disease (in the liver) at that time.  As part of her workup at that time, she underwent an echocardiogram which showed mildly reduced LV systolic function with an EF of 47%.  She was referred to Cardiology.  She underwent a stress cardiac MRI which showed normal LV size with mild to moderately reduced function and an LVEF of 43%.  At that time, she was noted to have mitral valve prolapse with mild mitral regurgitation.  Stress imaging did not show any evidence of ischemia.  She was started on carvedilol and lisinopril.  Her cancer was treated with 4 cycles of TC chemo and she was started on Herceptin on an every-3-week basis which is likely to continue lifelong.  Fortunately, from a cardiac standpoint, her followup cardiac MRIs and echocardiogram has shown slight improvement/stability with her EF with ongoing therapy.  In the late summer/early fall of 2018, she did develop severe facial swelling that was likely related to an episode of angioedema secondary to lisinopril.  At that time, we decided to continue with carvedilol alone unless we see a change in her LV function.      Unfortunately, since our  last office visit, the patient did have a PET CT which showed some mild advancement in her cancer.  She has now been started on Faslodex once a month as well as Kisqali.  She also continues on Herceptin.  There is a plan to do followup imaging in August.      From a cardiac standpoint, the patient states she is feeling well.  She has some mild fatigue but states that this is a common side effect to one of her new medications.        She had a followup echocardiogram today.  This shows preserved LV systolic function with an EF of 50%-55%.  Her global peak longitudinal strain is -22.6 which is within normal limits.  Overall, the reader felt that imaging appeared stable from her last echocardiogram.      CURRENT CARDIAC MEDICATIONS:   1.  Carvedilol 25 mg b.i.d.      The remainder of her medications, allergies and review of systems were reviewed and as are documented separately.      PHYSICAL EXAMINATION:   GENERAL:  The patient is a pleasant 64-year-old female who appears her stated age.  She is in no apparent distress.   VITAL SIGNS:  Her blood pressure is 100/63, pulse 72, weight is 157 pounds.  Breathing is nonlabored.   LUNGS:  Clear to auscultation.   CARDIAC:  Examination reveals a regular rate and rhythm.  I do not appreciate any significant murmur.   EXTREMITIES:  Lower extremities show no evidence of edema.   NEUROLOGIC:  Alert and oriented.      ASSESSMENT AND PLAN:  Ms. Guzman is a pleasant 64-year-old female with a history of recurrent metastatic breast cancer.  Again, she previously underwent treatment with Adriamycin in 2006 and it was felt that she likely developed a mild chemo-induced cardiomyopathy after this.  She has been started on appropriate medical therapy and her EF has improved and stabilized.  She is now getting Herceptin and her echo findings have remained stable.  She was recently started on 2 new medications but it does not appear that there is any risk of cardiomyopathy with these  medications that I can see.  Since she does continue on Herceptin, we will continue with the every-3-month echocardiograms.  Again, if her EF is dropping in the future, we may consider trialing a low dose of an ARB.      She will return to the clinic in 3 months for an echocardiogram and follow up with myself.  Of course, I encouraged her to contact us sooner with questions or concerns.         ANGEL MCDONNELL PA-C             D: 2019   T: 2019   MT: [place]      Name:     KIMMIE PRICE   MRN:      [id number]        Account:      [id number]   :      1955           Service Date: 2019      Document: [id number]

## 2019-07-01 NOTE — PROGRESS NOTES
Please see separate dictation for HPI, PHYSICAL EXAM AND IMPRESSION/PLAN.    CURRENT MEDICATIONS:  Current Outpatient Medications   Medication Sig Dispense Refill     carvedilol (COREG) 25 MG tablet Take 1 tablet (25 mg) by mouth 2 times daily (with meals) 180 tablet 3     fulvestrant (FASLODEX) 250 MG/5ML injection Inject into the muscle every 30 days       ribociclib (KISQALI 600 DOSE) 200 MG tablet Take 600 mg by mouth daily Three weeks on, one week off - repeat       TRASTUZUMAB IV Chemo agent       LORazepam (ATIVAN) 0.5 MG tablet Take 0.5 mg by mouth every 6 hours as needed for anxiety       Multiple Vitamins-Minerals (MULTIVITAMIN PO) Take by mouth daily         ALLERGIES:     Allergies   Allergen Reactions     Lisinopril Hives and Swelling     Venlafaxine Hives and Swelling     Possibly allergy       PAST MEDICAL HISTORY:  Past Medical History:   Diagnosis Date     Cancer (H)     breast     Cardiomyopathy (H)        PAST SURGICAL HISTORY:  Past Surgical History:   Procedure Laterality Date     BREAST SURGERY      March 2006       SOCIAL HISTORY:  Social History     Socioeconomic History     Marital status:      Spouse name: None     Number of children: None     Years of education: None     Highest education level: None   Occupational History     None   Social Needs     Financial resource strain: None     Food insecurity:     Worry: None     Inability: None     Transportation needs:     Medical: None     Non-medical: None   Tobacco Use     Smoking status: Never Smoker     Smokeless tobacco: Never Used   Substance and Sexual Activity     Alcohol use: No     Alcohol/week: 0.0 oz     Drug use: No     Sexual activity: Yes     Partners: Male   Lifestyle     Physical activity:     Days per week: None     Minutes per session: None     Stress: None   Relationships     Social connections:     Talks on phone: None     Gets together: None     Attends Anglican service: None     Active member of club or  organization: None     Attends meetings of clubs or organizations: None     Relationship status: None     Intimate partner violence:     Fear of current or ex partner: None     Emotionally abused: None     Physically abused: None     Forced sexual activity: None   Other Topics Concern     Parent/sibling w/ CABG, MI or angioplasty before 65F 55M? Not Asked   Social History Narrative     None       FAMILY HISTORY:  Family History   Problem Relation Age of Onset     Diabetes Mother      Breast Cancer Mother      Ovarian Cancer Mother      Hypertension Father      Breast Cancer Sister        Review of Systems:  Skin:  Negative       Eyes:  Negative      ENT:  Negative      Respiratory:  Negative       Cardiovascular:    Positive for;fatigue due to medication per pt  Gastroenterology: Negative      Genitourinary:  Negative      Musculoskeletal:  Negative      Neurologic:  Positive for numbness or tingling of hands;numbness or tingling of feet of the fingers and toes - due to chemo per pt  Psychiatric:  Negative      Heme/Lymph/Imm:  Positive for allergies RX  Endocrine:  Negative         Reviewed. Remainder of the note dictated.    Melisa Billingsley PA-C

## 2019-07-01 NOTE — LETTER
7/1/2019      Zain Lua MD  [facility] [address]  [facility] [address]      RE: Lorna Guzman       Dear Colleague,    I had the pleasure of seeing Lorna Guzman in the [facility].    Service Date: 07/01/2019      CARDIOLOGY CLINIC FOLLOWUP VISIT      HISTORY OF PRESENT ILLNESS:  Ms. Guzman is a pleasant 64-year-old female who presents to the office today for followup after recent echocardiogram.      Again, she is followed in our clinic from a Cardio-Oncology perspective due to history of a chemo-induced cardiomyopathy and right breast carcinoma with ongoing Herceptin use.  She was initially diagnosed with breast cancer in 2006.  At that time, she underwent a lumpectomy and AC chemo treatment as well as adjuvant radiation.  She was started on tamoxifen and after 2 years, was transitioned to Aromasin.  She completed this treatment in 07/2013.  Unfortunately, in late 2017, she was again diagnosed with invasive ductal carcinoma of the right breast.  She was also found to have evidence of metastatic disease (in the liver) at that time.  As part of her workup at that time, she underwent an echocardiogram which showed mildly reduced LV systolic function with an EF of 47%.  She was referred to Cardiology.  She underwent a stress cardiac MRI which showed normal LV size with mild to moderately reduced function and an LVEF of 43%.  At that time, she was noted to have mitral valve prolapse with mild mitral regurgitation.  Stress imaging did not show any evidence of ischemia.  She was started on carvedilol and lisinopril.  Her cancer was treated with 4 cycles of TC chemo and she was started on Herceptin on an every-3-week basis which is likely to continue lifelong.  Fortunately, from a cardiac standpoint, her followup cardiac MRIs and echocardiogram has shown slight improvement/stability with her EF with ongoing therapy.  In the late summer/early  fall of 2018, she did develop severe facial swelling that was likely related to an episode of angioedema secondary to lisinopril.  At that time, we decided to continue with carvedilol alone unless we see a change in her LV function.      Unfortunately, since our last office visit, the patient did have a PET CT which showed some mild advancement in her cancer.  She has now been started on Faslodex once a month as well as Kisqali.  She also continues on Herceptin.  There is a plan to do followup imaging in August.      From a cardiac standpoint, the patient states she is feeling well.  She has some mild fatigue but states that this is a common side effect to one of her new medications.        She had a followup echocardiogram today.  This shows preserved LV systolic function with an EF of 50%-55%.  Her global peak longitudinal strain is -22.6 which is within normal limits.  Overall, the reader felt that imaging appeared stable from her last echocardiogram.      CURRENT CARDIAC MEDICATIONS:   1.  Carvedilol 25 mg b.i.d.      The remainder of her medications, allergies and review of systems were reviewed and as are documented separately.      PHYSICAL EXAMINATION:   GENERAL:  The patient is a pleasant 64-year-old female who appears her stated age.  She is in no apparent distress.   VITAL SIGNS:  Her blood pressure is 100/63, pulse 72, weight is 157 pounds.  Breathing is nonlabored.   LUNGS:  Clear to auscultation.   CARDIAC:  Examination reveals a regular rate and rhythm.  I do not appreciate any significant murmur.   EXTREMITIES:  Lower extremities show no evidence of edema.   NEUROLOGIC:  Alert and oriented.      ASSESSMENT AND PLAN:  Ms. Guzman is a pleasant 64-year-old female with a history of recurrent metastatic breast cancer.  Again, she previously underwent treatment with Adriamycin in 2006 and it was felt that she likely developed a mild chemo-induced cardiomyopathy after this.  She has been started on  appropriate medical therapy and her EF has improved and stabilized.  She is now getting Herceptin and her echo findings have remained stable.  She was recently started on 2 new medications but it does not appear that there is any risk of cardiomyopathy with these medications that I can see.  Since she does continue on Herceptin, we will continue with the every-3-month echocardiograms.  Again, if her EF is dropping in the future, we may consider trialing a low dose of an ARB.      She will return to the clinic in 3 months for an echocardiogram and follow up with myself.  Of course, I encouraged her to contact us sooner with questions or concerns.         ANGEL MCDONNELL PA-C             D: 2019   T: 2019   MT: [place]      Name:     KIMMIE PRICE   MRN:      [id number]        Account:      [id number]   :      1955           Service Date: 2019      Document: [id number]           Outpatient Encounter Medications as of 2019   Medication Sig Dispense Refill     carvedilol (COREG) 25 MG tablet Take 1 tablet (25 mg) by mouth 2 times daily (with meals) 180 tablet 3     fulvestrant (FASLODEX) 250 MG/5ML injection Inject into the muscle every 30 days       ribociclib (KISQALI 600 DOSE) 200 MG tablet Take 600 mg by mouth daily Three weeks on, one week off - repeat       TRASTUZUMAB IV Chemo agent       LORazepam (ATIVAN) 0.5 MG tablet Take 0.5 mg by mouth every 6 hours as needed for anxiety       Multiple Vitamins-Minerals (MULTIVITAMIN PO) Take by mouth daily       No facility-administered encounter medications on file as of 2019.        Again, thank you for allowing me to participate in the care of your patient.      Sincerely,    Angel Mcdonnell PA-C     [facility]

## 2019-07-01 NOTE — LETTER
7/1/2019    Zain Lua MD  [facility] [address]  [facility] [address]    RE: Lorna Guzman       Dear Colleague,    I had the pleasure of seeing Lorna Guzman in the [facility].    Please see separate dictation for HPI, PHYSICAL EXAM AND IMPRESSION/PLAN.    CURRENT MEDICATIONS:  Current Outpatient Medications   Medication Sig Dispense Refill     carvedilol (COREG) 25 MG tablet Take 1 tablet (25 mg) by mouth 2 times daily (with meals) 180 tablet 3     fulvestrant (FASLODEX) 250 MG/5ML injection Inject into the muscle every 30 days       ribociclib (KISQALI 600 DOSE) 200 MG tablet Take 600 mg by mouth daily Three weeks on, one week off - repeat       TRASTUZUMAB IV Chemo agent       LORazepam (ATIVAN) 0.5 MG tablet Take 0.5 mg by mouth every 6 hours as needed for anxiety       Multiple Vitamins-Minerals (MULTIVITAMIN PO) Take by mouth daily         ALLERGIES:     Allergies   Allergen Reactions     Lisinopril Hives and Swelling     Venlafaxine Hives and Swelling     Possibly allergy       PAST MEDICAL HISTORY:  Past Medical History:   Diagnosis Date     Cancer (H)     breast     Cardiomyopathy (H)        PAST SURGICAL HISTORY:  Past Surgical History:   Procedure Laterality Date     BREAST SURGERY      March 2006       SOCIAL HISTORY:  Social History     Socioeconomic History     Marital status:      Spouse name: None     Number of children: None     Years of education: None     Highest education level: None   Occupational History     None   Social Needs     Financial resource strain: None     Food insecurity:     Worry: None     Inability: None     Transportation needs:     Medical: None     Non-medical: None   Tobacco Use     Smoking status: Never Smoker     Smokeless tobacco: Never Used   Substance and Sexual Activity     Alcohol use: No     Alcohol/week: 0.0 oz     Drug use: No     Sexual activity: Yes     Partners: Male   Lifestyle      Physical activity:     Days per week: None     Minutes per session: None     Stress: None   Relationships     Social connections:     Talks on phone: None     Gets together: None     Attends Nondenominational service: None     Active member of club or organization: None     Attends meetings of clubs or organizations: None     Relationship status: None     Intimate partner violence:     Fear of current or ex partner: None     Emotionally abused: None     Physically abused: None     Forced sexual activity: None   Other Topics Concern     Parent/sibling w/ CABG, MI or angioplasty before 65F 55M? Not Asked   Social History Narrative     None       FAMILY HISTORY:  Family History   Problem Relation Age of Onset     Diabetes Mother      Breast Cancer Mother      Ovarian Cancer Mother      Hypertension Father      Breast Cancer Sister        Review of Systems:  Skin:  Negative       Eyes:  Negative      ENT:  Negative      Respiratory:  Negative       Cardiovascular:    Positive for;fatigue due to medication per pt  Gastroenterology: Negative      Genitourinary:  Negative      Musculoskeletal:  Negative      Neurologic:  Positive for numbness or tingling of hands;numbness or tingling of feet of the fingers and toes - due to chemo per pt  Psychiatric:  Negative      Heme/Lymph/Imm:  Positive for allergies RX  Endocrine:  Negative         Reviewed. Remainder of the note dictated.    Melisa Billingsley PA-C    Service Date: 07/01/2019      CARDIOLOGY CLINIC FOLLOWUP VISIT      HISTORY OF PRESENT ILLNESS:  Ms. Guzman is a pleasant 64-year-old female who presents to the office today for followup after recent echocardiogram.      Again, she is followed in our clinic from a Cardio-Oncology perspective due to history of a chemo-induced cardiomyopathy and right breast carcinoma with ongoing Herceptin use.  She was initially diagnosed with breast cancer in 2006.  At that time, she underwent a lumpectomy and AC chemo treatment as  well as adjuvant radiation.  She was started on tamoxifen and after 2 years, was transitioned to Aromasin.  She completed this treatment in 07/2013.  Unfortunately, in late 2017, she was again diagnosed with invasive ductal carcinoma of the right breast.  She was also found to have evidence of metastatic disease (in the liver) at that time.  As part of her workup at that time, she underwent an echocardiogram which showed mildly reduced LV systolic function with an EF of 47%.  She was referred to Cardiology.  She underwent a stress cardiac MRI which showed normal LV size with mild to moderately reduced function and an LVEF of 43%.  At that time, she was noted to have mitral valve prolapse with mild mitral regurgitation.  Stress imaging did not show any evidence of ischemia.  She was started on carvedilol and lisinopril.  Her cancer was treated with 4 cycles of TC chemo and she was started on Herceptin on an every-3-week basis which is likely to continue lifelong.  Fortunately, from a cardiac standpoint, her followup cardiac MRIs and echocardiogram has shown slight improvement/stability with her EF with ongoing therapy.  In the late summer/early fall of 2018, she did develop severe facial swelling that was likely related to an episode of angioedema secondary to lisinopril.  At that time, we decided to continue with carvedilol alone unless we see a change in her LV function.      Unfortunately, since our last office visit, the patient did have a PET CT which showed some mild advancement in her cancer.  She has now been started on Faslodex once a month as well as Kisqali.  She also continues on Herceptin.  There is a plan to do followup imaging in August.      From a cardiac standpoint, the patient states she is feeling well.  She has some mild fatigue but states that this is a common side effect to one of her new medications.        She had a followup echocardiogram today.  This shows preserved LV systolic function  with an EF of 50%-55%.  Her global peak longitudinal strain is -22.6 which is within normal limits.  Overall, the reader felt that imaging appeared stable from her last echocardiogram.      CURRENT CARDIAC MEDICATIONS:   1.  Carvedilol 25 mg b.i.d.      The remainder of her medications, allergies and review of systems were reviewed and as are documented separately.      PHYSICAL EXAMINATION:   GENERAL:  The patient is a pleasant 64-year-old female who appears her stated age.  She is in no apparent distress.   VITAL SIGNS:  Her blood pressure is 100/63, pulse 72, weight is 157 pounds.  Breathing is nonlabored.   LUNGS:  Clear to auscultation.   CARDIAC:  Examination reveals a regular rate and rhythm.  I do not appreciate any significant murmur.   EXTREMITIES:  Lower extremities show no evidence of edema.   NEUROLOGIC:  Alert and oriented.      ASSESSMENT AND PLAN:  Ms. Guzman is a pleasant 64-year-old female with a history of recurrent metastatic breast cancer.  Again, she previously underwent treatment with Adriamycin in 2006 and it was felt that she likely developed a mild chemo-induced cardiomyopathy after this.  She has been started on appropriate medical therapy and her EF has improved and stabilized.  She is now getting Herceptin and her echo findings have remained stable.  She was recently started on 2 new medications but it does not appear that there is any risk of cardiomyopathy with these medications that I can see.  Since she does continue on Herceptin, we will continue with the every-3-month echocardiograms.  Again, if her EF is dropping in the future, we may consider trialing a low dose of an ARB.      She will return to the clinic in 3 months for an echocardiogram and follow up with myself.  Of course, I encouraged her to contact us sooner with questions or concerns.         ANGEL MCDONNELL PA-C             D: 07/01/2019   T: 07/01/2019   MT: W[place]      Name:     KIMMIE GUZMAN   MRN:       [id number]        Account:      [id number]   :      1955           Service Date: 2019      Document: [id number]        Thank you for allowing me to participate in the care of your patient.      Sincerely,     Melisa Billingsley PA-C     [facility]    cc:   Meilsa Billingsley PA-C  [facility]  [address] [place] [address]

## 2019-08-01 ENCOUNTER — TRANSFERRED RECORDS (OUTPATIENT)
Dept: HEALTH INFORMATION MANAGEMENT | Facility: CLINIC | Age: 64
End: 2019-08-01

## 2019-08-02 ENCOUNTER — DOCUMENTATION ONLY (OUTPATIENT)
Dept: CARDIOLOGY | Facility: CLINIC | Age: 64
End: 2019-08-02

## 2019-09-26 ENCOUNTER — TRANSFERRED RECORDS (OUTPATIENT)
Dept: SURGERY | Facility: CLINIC | Age: 64
End: 2019-09-26

## 2019-09-29 ENCOUNTER — HEALTH MAINTENANCE LETTER (OUTPATIENT)
Age: 64
End: 2019-09-29

## 2019-09-30 ENCOUNTER — TRANSFERRED RECORDS (OUTPATIENT)
Dept: HEALTH INFORMATION MANAGEMENT | Facility: CLINIC | Age: 64
End: 2019-09-30

## 2019-10-02 ENCOUNTER — DOCUMENTATION ONLY (OUTPATIENT)
Dept: CARDIOLOGY | Facility: CLINIC | Age: 64
End: 2019-10-02

## 2019-10-02 NOTE — PROGRESS NOTES
[facility] note 9-30-19 Follow up breast cancer metastatic. Continue on fulestrant, ribocicib, and trastuzumab.

## 2019-10-03 ENCOUNTER — TELEPHONE (OUTPATIENT)
Dept: SURGERY | Facility: CLINIC | Age: 64
End: 2019-10-03

## 2019-10-03 ENCOUNTER — TRANSFERRED RECORDS (OUTPATIENT)
Dept: SURGERY | Facility: CLINIC | Age: 64
End: 2019-10-03

## 2019-10-03 NOTE — TELEPHONE ENCOUNTER
Referral received from HOWIE TRAN  for Breast.    Attempt #1:    Called patient at [phone].  No answer - left message for patient to return call to clinic.  Melisa

## 2019-10-07 ENCOUNTER — PREP FOR PROCEDURE (OUTPATIENT)
Dept: SURGERY | Facility: CLINIC | Age: 64
End: 2019-10-07

## 2019-10-07 ENCOUNTER — OFFICE VISIT (OUTPATIENT)
Dept: SURGERY | Facility: CLINIC | Age: 64
End: 2019-10-07
Payer: MEDICARE

## 2019-10-07 VITALS
RESPIRATION RATE: 16 BRPM | SYSTOLIC BLOOD PRESSURE: 122 MMHG | OXYGEN SATURATION: 99 % | DIASTOLIC BLOOD PRESSURE: 82 MMHG | WEIGHT: 157 LBS | HEIGHT: 69 IN | HEART RATE: 60 BPM | BODY MASS INDEX: 23.25 KG/M2

## 2019-10-07 DIAGNOSIS — C50.111 MALIGNANT NEOPLASM OF CENTRAL PORTION OF RIGHT BREAST IN FEMALE, ESTROGEN RECEPTOR NEGATIVE (H): Primary | ICD-10-CM

## 2019-10-07 DIAGNOSIS — Z17.1 MALIGNANT NEOPLASM OF CENTRAL PORTION OF RIGHT BREAST IN FEMALE, ESTROGEN RECEPTOR NEGATIVE (H): Primary | ICD-10-CM

## 2019-10-07 DIAGNOSIS — C50.911 MALIGNANT NEOPLASM OF RIGHT FEMALE BREAST, UNSPECIFIED ESTROGEN RECEPTOR STATUS, UNSPECIFIED SITE OF BREAST (H): Primary | ICD-10-CM

## 2019-10-07 PROCEDURE — 99214 OFFICE O/P EST MOD 30 MIN: CPT | Performed by: SURGERY

## 2019-10-07 RX ORDER — LEVOCETIRIZINE DIHYDROCHLORIDE 5 MG/1
2.5 TABLET, FILM COATED ORAL EVERY EVENING
COMMUNITY
End: 2020-10-06

## 2019-10-07 ASSESSMENT — MIFFLIN-ST. JEOR: SCORE: 1336.53

## 2019-10-07 NOTE — LETTER
2019       Re: Lorna A Thomas - 1955    In , she had a lumpectomy for a invasive ductal cancer ER MS positive HER-2 negative that was then treated with Adriamycin and cyclophosphamide with maintenance therapy thereafter on tamoxifen and eventually anastrozole.  In  a palpable abnormality developed in her breast as well as concern for lymph node involvement-core biopsy showed infiltrating ductal carcinoma grade 3 ER 1% MS 1% and HER-2 strongly amplified.  Core biopsy of the lymph node was also positive for metastatic breast cancer.  At that time she was referred for further evaluation and possible chemotherapy.  As part of staging, a 1.7 cm liver lesion was found and then confirmed as breast cancer on biopsy.  She went on to receive docetaxel cyclophosphamide and trastuzumab with an excellent response and nap adenopathy and disappearance of her liver lesion.  She is then been maintained on trastuzumab.  Follow-up PET/CT 2019 showed a mass in the breast has increased in size and metabolic activity and there was also concern for a increasing nodes on the right subpectoral region.  She has also noted an increase in the size of her mass and an open area in the skin although, per notes, there is no evidence for inflammatory breast cancer.  There was no convincing evidence for liver disease.  She was then initiated on fulvestrant and ribociclib since May 2019.  She is referred to us today for consideration of mastectomy due to growth of her tumor through the skin.  With her previous core biopsy and her axilla, seed localized axillary node biopsy is also to be considered.     Genetic testing has been negative, twice      PE:  Vitals: There were no vitals taken for this visit.  General appearance: well-nourished, sitting comfortably, no apparent distress  Neck: Supple without thyromegaly, lymphadenopathy, masses  Lungs: Respirations unlabored  Abdomen: soft, nondistended  Extremities:  Without edema  Neurologic: nonfocal, grossly intact times four extremities, alert and oriented times three  Psychiatric: Mood and affect are appropriate  Skin: Without lesions or rashes  Breast:Masses- right - 5 cm diameter with skin ulceration in and at the edge of her areola.  The 2 areas of ulceration are each about 1.5 cm in size . there is no evidence for active cellulitis              Ecchymosis- none                 Axillae:   Palpable adenopathy: none     Assessment: Right breast cancer now extending through the skin at the areola with metastatic spread to the liver, excellent response to chemotherapy and continuing palliative treatment.     PLAN:  Discussed option of mastectomy including incision, scar, reconstructive options, (closure of this mastectomy wound will be quite tight and with the open skin, risk of prosthetic infection would be higher so I recommended delay in reconstruction-currently she is quite content to use a prosthesis rather than undergo additional surgery for reconstruction, she understands that she could undergo reconstruction at any time) expected recovery, the use of a drain, infection bleeding and seroma formation.  She understands that this would not be a curative procedure.  We also discussed further evaluation of her axilla in particular, performing a seed localized axillary node biopsy at the same time as her mastectomy.  In discussing her situation with her oncologist, they feel that there is little to be gained by considering a complete axillary dissection however they would be interested in evaluating the previously biopsied node.     We will schedule her for right mastectomy and seed localized axillary node biopsy     Nino Castellano MD

## 2019-10-07 NOTE — LETTER
[facility]    [address], [address]  [place], [place] [address]  Phone [phone]  Fax [phone] [address], [address]  [place] [address]  Phone [phone]  Fax [phone]    [email or web address]   October 7, 2019      Lorna Guzman  [address] [facility] [address]      We realize with scheduling surgery, one of your first questions is, how much will this cost?  Below we have provided you with the information you will need to receive an estimate for your surgery.    You are scheduled for the following procedure:  Right mastectomy and seed localized right axillary node biopsy      Surgeon:  Dr. Castellano     Physician Assistant:  Yes      Please make sure to have your insurance card available at the time of calling.    Surgeon & Physician Assistant charges and facility charges for [facility], [facility] or [facility]:    Consumer Price Line at [phone]   -  It is important to note that there may be a Physician Assistant assisting with your surgery.  Please be sure to mention this when calling for the estimate.      Facility Charges at [facility], [facility] or [facility]:  [facility] at [phone]  [facility] at [phone]  [facility] at [phone] or [phone]    Anesthesiologist Charges:  [facility] at [phone]    [facility] - Nurse Anesthetist Charges:  [facility] at [phone]

## 2019-10-07 NOTE — PROGRESS NOTES
HPI:  In 2006, she had a lumpectomy for a invasive ductal cancer ER SC positive HER-2 negative that was then treated with Adriamycin and cyclophosphamide with maintenance therapy thereafter on tamoxifen and eventually anastrozole.  In 2017 a palpable abnormality developed in her breast as well as concern for lymph node involvement-core biopsy showed infiltrating ductal carcinoma grade 3 ER 1% SC 1% and HER-2 strongly amplified.  Core biopsy of the lymph node was also positive for metastatic breast cancer.  At that time she was referred for further evaluation and possible chemotherapy.  As part of staging, a 1.7 cm liver lesion was found and then confirmed as breast cancer on biopsy.  She went on to receive docetaxel cyclophosphamide and trastuzumab with an excellent response and nap adenopathy and disappearance of her liver lesion.  She is then been maintained on trastuzumab.  Follow-up PET/CT 4/19/2019 showed a mass in the breast has increased in size and metabolic activity and there was also concern for a increasing nodes on the right subpectoral region.  She has also noted an increase in the size of her mass and an open area in the skin although, per notes, there is no evidence for inflammatory breast cancer.  There was no convincing evidence for liver disease.  She was then initiated on fulvestrant and ribociclib since May 2019.  She is referred to us today for consideration of mastectomy due to growth of her tumor through the skin.  With her previous core biopsy and her axilla, seed localized axillary node biopsy is also to be considered.    Genetic testing has been negative, twice    Family History   Problem Relation Age of Onset     Diabetes Mother      Breast Cancer Mother      Ovarian Cancer Mother      Hypertension Father      Breast Cancer Sister      Family history of breast cancer: Yes -mother and sister, she also has a history of ovarian cancer  Family history of colon cancer: No  Family history of  pancreatic cancer: No  Family history of prostate cancer: No    Images from her PET CT scan in April are not available today  Images from her CT scan of 7/30/2019 are also not available  Images from her CT scan of the chest and pelvis on 9/26/2019 are also not available     Past Medical History:   has a past medical history of Cancer (H) and Cardiomyopathy (H).    Past Surgical History:  Past Surgical History:   Procedure Laterality Date     BREAST SURGERY      March 2006        Social History:  Social History     Socioeconomic History     Marital status:      Spouse name: Not on file     Number of children: Not on file     Years of education: Not on file     Highest education level: Not on file   Occupational History     Not on file   Social Needs     Financial resource strain: Not on file     Food insecurity:     Worry: Not on file     Inability: Not on file     Transportation needs:     Medical: Not on file     Non-medical: Not on file   Tobacco Use     Smoking status: Never Smoker     Smokeless tobacco: Never Used   Substance and Sexual Activity     Alcohol use: No     Alcohol/week: 0.0 standard drinks     Drug use: No     Sexual activity: Yes     Partners: Male   Lifestyle     Physical activity:     Days per week: Not on file     Minutes per session: Not on file     Stress: Not on file   Relationships     Social connections:     Talks on phone: Not on file     Gets together: Not on file     Attends Taoist service: Not on file     Active member of club or organization: Not on file     Attends meetings of clubs or organizations: Not on file     Relationship status: Not on file     Intimate partner violence:     Fear of current or ex partner: Not on file     Emotionally abused: Not on file     Physically abused: Not on file     Forced sexual activity: Not on file   Other Topics Concern     Parent/sibling w/ CABG, MI or angioplasty before 65F 55M? Not Asked   Social History Narrative     Not on file        PE:  Vitals: There were no vitals taken for this visit.  General appearance: well-nourished, sitting comfortably, no apparent distress  Neck: Supple without thyromegaly, lymphadenopathy, masses  Lungs: Respirations unlabored  Abdomen: soft, nondistended  Extremities: Without edema  Neurologic: nonfocal, grossly intact times four extremities, alert and oriented times three  Psychiatric: Mood and affect are appropriate  Skin: Without lesions or rashes  Breast:     Masses- right - 5 cm diameter with skin ulceration in and at the edge of her areola.  The 2 areas of ulceration are each about 1.5 cm in size . there is no evidence for active cellulitis   Ecchymosis- none       Axillae:   Palpable adenopathy: none    Assessment: Right breast cancer now extending through the skin at the areola with metastatic spread to the liver, excellent response to chemotherapy and continuing palliative treatment.    PLAN:  Discussed option of mastectomy including incision, scar, reconstructive options, (closure of this mastectomy wound will be quite tight and with the open skin, risk of prosthetic infection would be higher so I recommended delay in reconstruction-currently she is quite content to use a prosthesis rather than undergo additional surgery for reconstruction, she understands that she could undergo reconstruction at any time) expected recovery, the use of a drain, infection bleeding and seroma formation.  She understands that this would not be a curative procedure.  We also discussed further evaluation of her axilla in particular, performing a seed localized axillary node biopsy at the same time as her mastectomy.  In discussing her situation with her oncologist, they feel that there is little to be gained by considering a complete axillary dissection however they would be interested in evaluating the previously biopsied node.    We will schedule her for right mastectomy and seed localized axillary node biopsy    Nino  TRACEY Castellano MD    Please route or send letter to:  Primary Care Provider (PCP) and oncologist (Dr. Zain Lua) and Include Progress Note    Images from [facility] have now been obtained and reviewed.  It appears that the breast will be able to be  from the pectoralis muscle.

## 2019-10-08 ENCOUNTER — TELEPHONE (OUTPATIENT)
Dept: SURGERY | Facility: CLINIC | Age: 64
End: 2019-10-08

## 2019-10-08 NOTE — TELEPHONE ENCOUNTER
Type of surgery: RIGHT MASTECTOMY AND SEED LOCALIZED RIGHT AXILLARY NODE BIOPSY   Location of surgery: [facility]  Date and time of surgery: 10-24-19, 10:30 AM   Surgeon: DR. BRADY   Pre-Op Appt Date: PATIENT TO SCHEDULE   Post-Op Appt Date: PATIENT TO SCHEDULE    Packet sent out: GIVEN TO PATIENT   Pre-cert/Authorization completed:  Not Applicable  Date: 10-8-19      *outpatient overnight* RIGHT MASTECTOMY AND SEED LOCALIZED RIGHT AXILLARY NODE BIOPSY   GENERAL PT INST TO HAVE H&P WITH PCP  90 MINS REQ   PA ASSIST DJM  ALW   R axillary seed loc at 8:30 am  alw

## 2019-10-10 ENCOUNTER — OFFICE VISIT (OUTPATIENT)
Dept: CARDIOLOGY | Facility: CLINIC | Age: 64
End: 2019-10-10
Attending: PHYSICIAN ASSISTANT
Payer: MEDICARE

## 2019-10-10 ENCOUNTER — HOSPITAL ENCOUNTER (OUTPATIENT)
Dept: CARDIOLOGY | Facility: CLINIC | Age: 64
Discharge: HOME | End: 2019-10-10
Attending: PHYSICIAN ASSISTANT | Admitting: PHYSICIAN ASSISTANT
Payer: MEDICARE

## 2019-10-10 VITALS
SYSTOLIC BLOOD PRESSURE: 122 MMHG | HEIGHT: 69 IN | BODY MASS INDEX: 23.25 KG/M2 | DIASTOLIC BLOOD PRESSURE: 62 MMHG | HEART RATE: 66 BPM | WEIGHT: 157 LBS | OXYGEN SATURATION: 97 %

## 2019-10-10 DIAGNOSIS — I42.9 SECONDARY CARDIOMYOPATHY (H): ICD-10-CM

## 2019-10-10 DIAGNOSIS — I42.9 SECONDARY CARDIOMYOPATHY (H): Primary | ICD-10-CM

## 2019-10-10 PROCEDURE — 93308 TTE F-UP OR LMTD: CPT

## 2019-10-10 PROCEDURE — 99213 OFFICE O/P EST LOW 20 MIN: CPT | Mod: 25 | Performed by: PHYSICIAN ASSISTANT

## 2019-10-10 PROCEDURE — 93321 DOPPLER ECHO F-UP/LMTD STD: CPT | Mod: 26 | Performed by: INTERNAL MEDICINE

## 2019-10-10 PROCEDURE — 93308 TTE F-UP OR LMTD: CPT | Mod: 26 | Performed by: INTERNAL MEDICINE

## 2019-10-10 PROCEDURE — 93325 DOPPLER ECHO COLOR FLOW MAPG: CPT | Mod: 26 | Performed by: INTERNAL MEDICINE

## 2019-10-10 ASSESSMENT — MIFFLIN-ST. JEOR: SCORE: 1336.53

## 2019-10-10 NOTE — PROGRESS NOTES
Service Date: 10/10/2019      HISTORY OF PRESENT ILLNESS:  Ms. Guzman is a very pleasant 64-year-old female who presents to the office today for followup after a recent echocardiogram.      Again, she is followed in our clinic from a Cardio-Oncology perspective due to a history of a chemo-induced cardiomyopathy and right breast carcinoma with ongoing Herceptin use.  She was initially diagnosed with breast cancer in 2006, at which time she underwent a lumpectomy, AC chemotherapy and adjuvant radiation.  She was started on tamoxifen and after 2 years was transitioned to Aromasin.  She completed this treatment in 07/2013.  Unfortunately, in late 2017 she was again diagnosed with an invasive ductal carcinoma of the right breast.  She was also found to have evidence of metastatic disease (in the liver and a lymph node) at that time.  As part of her workup, she underwent an echocardiogram which showed mildly reduced LV systolic function with an EF of approximately 47%.  It was at that time that she was referred to Cardiology.  She had a stress cardiac MRI which showed normal LV size with mild to moderately reduced function and an LVEF of 43%.  She was also noted to have mitral valve prolapse and mild mitral regurgitation.  Stress imaging did not show any evidence of ischemia.  She was started on carvedilol and lisinopril at that time.  Her cancer was treated with 4 cycles of TC chemotherapy, and she was started on Herceptin on an every 3 week basis, which is likely to continue lifelong.  Fortunately, from a cardiac standpoint, followup cardiac MRIs and echocardiography have shown slight improvement/stability of her LVEF with ongoing therapy.  In the late summer/early fall of 2018 she did develop severe facial swelling and it was felt that this was related to angioedema secondary to her lisinopril, which was discontinued.  At that time, we decided to continue with carvedilol alone unless we saw a decline in her LV  systolic function.      From a cardiac standpoint, she has been doing well and denies any cardiovascular symptoms.  She has been following closely with Oncology, and unfortunately her right breast tumor is now causing some cutaneous changes and she will be undergoing a right-sided mastectomy as well as lymph node biopsy which is scheduled in 2 weeks from today.  For the time being, she will continue on Faslodex as well as Kisqali and Herceptin.  She may possibly need further chemotherapy in the future, but they are going to hold off for the time being.  She continues to follow very closely with Dr. Lua at [facility] now that Dr. Lindo has left the practice.      She had a followup echocardiogram today that shows preserved LV systolic function with an EF of 55%-60%.  She continues to show evidence of mild mitral regurgitation and trace to mild tricuspid regurgitation.  Her GLS is negative 20.4, which is within normal limits.  Again, this is overall stable to possibly slightly improved from her last echocardiogram.      CURRENT CARDIAC MEDICATIONS:   1.  Carvedilol 25 mg b.i.d.      The remainder of her medications, allergies and review of systems were reviewed and as are documented separately.      PHYSICAL EXAMINATION:   GENERAL:  The patient is a pleasant 64-year-old female who appears her stated age.  She is in no apparent distress.   VITAL SIGNS:  Blood pressure is 122/62.  Pulse is 66.  Weight is 157 pounds, which is stable.   PULMONARY:  Breathing is nonlabored.  Lungs are clear to auscultation.   CARDIAC:  Examination reveals a regular rate and rhythm, no murmur is appreciated.   NEUROLOGIC:  Alert and oriented.      ASSESSMENT AND PLAN:  Ms. Guzman is a very pleasant 64-year-old female with a history of recurrent metastatic right breast cancer.  Again, she previously underwent treatment with Adriamycin in 2006 and it was felt that she likely developed a mild chemo-induced cardiomyopathy after this.   Fortunately, with medical therapy, her EF has essentially normalized and stabilized.  She continues to get Herceptin, and we will continue every 3 month echos while she remains on this therapy.  She is scheduled for a mastectomy later this month, and I do not see any contraindication from a cardiac standpoint in having her move forward with the surgery as planned.      I will plan to see her back in late December, hopefully prior to my maternity leave, with a repeat echocardiogram prior to that office visit.  We will then plan her 3-month followup in the spring with Dr. Spencer.  Of course, I encouraged her to contact us sooner with any questions or concerns.      cc:   Zain Lua MD    [facility]    [address], [address]    [place] [address]       Nino Castellano MD    [facility]    [address]    [place]  [address]         ANGEL MCDONNELL PA-C             D: 10/10/2019   T: 10/10/2019   MT: ERON      Name:     KIMMIE PRICE   MRN:      [id number]        Account:      [id number]   :      1955           Service Date: 10/10/2019      Document: [id number]

## 2019-10-10 NOTE — LETTER
10/10/2019      Zain Lua MD  [facility] [address]  [facility] [address]      RE: Lorna Guzman       Dear Colleague,    I had the pleasure of seeing Lorna Guzman in the [facility].    Service Date: 10/10/2019      HISTORY OF PRESENT ILLNESS:  Ms. Guzman is a very pleasant 64-year-old female who presents to the office today for followup after a recent echocardiogram.      Again, she is followed in our clinic from a Cardio-Oncology perspective due to a history of a chemo-induced cardiomyopathy and right breast carcinoma with ongoing Herceptin use.  She was initially diagnosed with breast cancer in 2006, at which time she underwent a lumpectomy, AC chemotherapy and adjuvant radiation.  She was started on tamoxifen and after 2 years was transitioned to Aromasin.  She completed this treatment in 07/2013.  Unfortunately, in late 2017 she was again diagnosed with an invasive ductal carcinoma of the right breast.  She was also found to have evidence of metastatic disease (in the liver and a lymph node) at that time.  As part of her workup, she underwent an echocardiogram which showed mildly reduced LV systolic function with an EF of approximately 47%.  It was at that time that she was referred to Cardiology.  She had a stress cardiac MRI which showed normal LV size with mild to moderately reduced function and an LVEF of 43%.  She was also noted to have mitral valve prolapse and mild mitral regurgitation.  Stress imaging did not show any evidence of ischemia.  She was started on carvedilol and lisinopril at that time.  Her cancer was treated with 4 cycles of TC chemotherapy, and she was started on Herceptin on an every 3 week basis, which is likely to continue lifelong.  Fortunately, from a cardiac standpoint, followup cardiac MRIs and echocardiography have shown slight improvement/stability of her LVEF with ongoing therapy.  In the late  summer/early fall of 2018 she did develop severe facial swelling and it was felt that this was related to angioedema secondary to her lisinopril, which was discontinued.  At that time, we decided to continue with carvedilol alone unless we saw a decline in her LV systolic function.      From a cardiac standpoint, she has been doing well and denies any cardiovascular symptoms.  She has been following closely with Oncology, and unfortunately her right breast tumor is now causing some cutaneous changes and she will be undergoing a right-sided mastectomy as well as lymph node biopsy which is scheduled in 2 weeks from today.  For the time being, she will continue on Faslodex as well as Kisqali and Herceptin.  She may possibly need further chemotherapy in the future, but they are going to hold off for the time being.  She continues to follow very closely with Dr. Lua at [facility] now that Dr. Lindo has left the practice.      She had a followup echocardiogram today that shows preserved LV systolic function with an EF of 55%-60%.  She continues to show evidence of mild mitral regurgitation and trace to mild tricuspid regurgitation.  Her GLS is negative 20.4, which is within normal limits.  Again, this is overall stable to possibly slightly improved from her last echocardiogram.      CURRENT CARDIAC MEDICATIONS:   1.  Carvedilol 25 mg b.i.d.      The remainder of her medications, allergies and review of systems were reviewed and as are documented separately.      PHYSICAL EXAMINATION:   GENERAL:  The patient is a pleasant 64-year-old female who appears her stated age.  She is in no apparent distress.   VITAL SIGNS:  Blood pressure is 122/62.  Pulse is 66.  Weight is 157 pounds, which is stable.   PULMONARY:  Breathing is nonlabored.  Lungs are clear to auscultation.   CARDIAC:  Examination reveals a regular rate and rhythm, no murmur is appreciated.   NEUROLOGIC:  Alert and oriented.      ASSESSMENT AND PLAN:  Ms.  Thomas is a very pleasant 64-year-old female with a history of recurrent metastatic right breast cancer.  Again, she previously underwent treatment with Adriamycin in  and it was felt that she likely developed a mild chemo-induced cardiomyopathy after this.  Fortunately, with medical therapy, her EF has essentially normalized and stabilized.  She continues to get Herceptin, and we will continue every 3 month echos while she remains on this therapy.  She is scheduled for a mastectomy later this month, and I do not see any contraindication from a cardiac standpoint in having her move forward with the surgery as planned.      I will plan to see her back in late December, hopefully prior to my maternity leave, with a repeat echocardiogram prior to that office visit.  We will then plan her 3-month followup in the spring with Dr. Spencer.  Of course, I encouraged her to contact us sooner with any questions or concerns.      cc:   Zain Lua MD    [facility]    [address], [address]    [place] [address]       Nino Castellano MD    [facility]    [address]    [place]  [address]           ANGEL MCDONNELL PA-C             D: 10/10/2019   T: 10/10/2019   MT: ERON      Name:     KIMMIE PRICE   MRN:      [id number]        Account:      [id number]   :      1955           Service Date: 10/10/2019      Document: [id number]         Outpatient Encounter Medications as of 10/10/2019   Medication Sig Dispense Refill     carvedilol (COREG) 25 MG tablet Take 1 tablet (25 mg) by mouth 2 times daily (with meals) 180 tablet 3     fulvestrant (FASLODEX) 250 MG/5ML injection Inject into the muscle every 30 days       levocetirizine (XYZAL) 5 MG tablet Take 2.5 mg by mouth every evening       ribociclib (KISQALI 600 DOSE) 200 MG tablet Take 600 mg by mouth daily Three weeks on, one week off - repeat       TRASTUZUMAB IV Chemo agent        Multiple Vitamins-Minerals (MULTIVITAMIN PO) Take by mouth daily       No facility-administered encounter medications on file as of 10/10/2019.        Again, thank you for allowing me to participate in the care of your patient.      Sincerely,    Melisa Billingsley PA-C     [facility]

## 2019-10-10 NOTE — PATIENT INSTRUCTIONS
Thank you for your [facility] visit today. Your provider has recommended the following:  Medication Changes:  No changes  Recommendations:  Echo in ~3 months  Follow-up:  See Melisa JOHNSON for cardiology follow up in ~3 months.    Reminder:  Please bring in your current medication list or your medication, over the counter supplements and vitamin bottles as we will review these at each office visit.

## 2019-10-10 NOTE — LETTER
10/10/2019    Zain Lua MD  [facility] [address]  [facility] [address]    RE: Lorna A Thomas       Dear Colleague,    I had the pleasure of seeing Lorna Guzman in the [facility].    Please see separate dictation for HPI, PHYSICAL EXAM AND IMPRESSION/PLAN.    CURRENT MEDICATIONS:  Current Outpatient Medications   Medication Sig Dispense Refill     carvedilol (COREG) 25 MG tablet Take 1 tablet (25 mg) by mouth 2 times daily (with meals) 180 tablet 3     fulvestrant (FASLODEX) 250 MG/5ML injection Inject into the muscle every 30 days       levocetirizine (XYZAL) 5 MG tablet Take 2.5 mg by mouth every evening       ribociclib (KISQALI 600 DOSE) 200 MG tablet Take 600 mg by mouth daily Three weeks on, one week off - repeat       TRASTUZUMAB IV Chemo agent       Multiple Vitamins-Minerals (MULTIVITAMIN PO) Take by mouth daily         ALLERGIES:     Allergies   Allergen Reactions     Lisinopril Hives and Swelling     Venlafaxine Hives and Swelling     Possibly allergy       PAST MEDICAL HISTORY:  Past Medical History:   Diagnosis Date     Cancer (H)     breast     Cardiomyopathy (H)        PAST SURGICAL HISTORY:  Past Surgical History:   Procedure Laterality Date     BREAST SURGERY      March 2006       SOCIAL HISTORY:  Social History     Socioeconomic History     Marital status:      Spouse name: None     Number of children: None     Years of education: None     Highest education level: None   Occupational History     None   Social Needs     Financial resource strain: None     Food insecurity:     Worry: None     Inability: None     Transportation needs:     Medical: None     Non-medical: None   Tobacco Use     Smoking status: Never Smoker     Smokeless tobacco: Never Used   Substance and Sexual Activity     Alcohol use: No     Alcohol/week: 0.0 standard drinks     Drug use: No     Sexual activity: Yes     Partners: Male   Lifestyle      Physical activity:     Days per week: None     Minutes per session: None     Stress: None   Relationships     Social connections:     Talks on phone: None     Gets together: None     Attends Orthodox service: None     Active member of club or organization: None     Attends meetings of clubs or organizations: None     Relationship status: None     Intimate partner violence:     Fear of current or ex partner: None     Emotionally abused: None     Physically abused: None     Forced sexual activity: None   Other Topics Concern     Parent/sibling w/ CABG, MI or angioplasty before 65F 55M? Not Asked   Social History Narrative     None       FAMILY HISTORY:  Family History   Problem Relation Age of Onset     Diabetes Mother      Breast Cancer Mother      Ovarian Cancer Mother      Hypertension Father      Breast Cancer Sister        Review of Systems:  Skin:  Negative       Eyes:  Positive for contacts    ENT:         Respiratory:  Negative       Cardiovascular:  Negative      Gastroenterology: Negative      Genitourinary:  Negative      Musculoskeletal:  Negative      Neurologic:  Positive for numbness or tingling of hands of the fingers and toes - due to chemo per pt  Psychiatric:  Negative      Heme/Lymph/Imm:  Negative      Endocrine:  Negative          Service Date: 10/10/2019      HISTORY OF PRESENT ILLNESS:  Ms. Guzman is a very pleasant 64-year-old female who presents to the office today for followup after a recent echocardiogram.      Again, she is followed in our clinic from a Cardio-Oncology perspective due to a history of a chemo-induced cardiomyopathy and right breast carcinoma with ongoing Herceptin use.  She was initially diagnosed with breast cancer in 2006, at which time she underwent a lumpectomy, AC chemotherapy and adjuvant radiation.  She was started on tamoxifen and after 2 years was transitioned to Aromasin.  She completed this treatment in 07/2013.  Unfortunately, in late 2017 she was again  diagnosed with an invasive ductal carcinoma of the right breast.  She was also found to have evidence of metastatic disease (in the liver and a lymph node) at that time.  As part of her workup, she underwent an echocardiogram which showed mildly reduced LV systolic function with an EF of approximately 47%.  It was at that time that she was referred to Cardiology.  She had a stress cardiac MRI which showed normal LV size with mild to moderately reduced function and an LVEF of 43%.  She was also noted to have mitral valve prolapse and mild mitral regurgitation.  Stress imaging did not show any evidence of ischemia.  She was started on carvedilol and lisinopril at that time.  Her cancer was treated with 4 cycles of TC chemotherapy, and she was started on Herceptin on an every 3 week basis, which is likely to continue lifelong.  Fortunately, from a cardiac standpoint, followup cardiac MRIs and echocardiography have shown slight improvement/stability of her LVEF with ongoing therapy.  In the late summer/early fall of 2018 she did develop severe facial swelling and it was felt that this was related to angioedema secondary to her lisinopril, which was discontinued.  At that time, we decided to continue with carvedilol alone unless we saw a decline in her LV systolic function.      From a cardiac standpoint, she has been doing well and denies any cardiovascular symptoms.  She has been following closely with Oncology, and unfortunately her right breast tumor is now causing some cutaneous changes and she will be undergoing a right-sided mastectomy as well as lymph node biopsy which is scheduled in 2 weeks from today.  For the time being, she will continue on Faslodex as well as Kisqali and Herceptin.  She may possibly need further chemotherapy in the future, but they are going to hold off for the time being.  She continues to follow very closely with Dr. Lua at [facility] now that Dr. Lindo has left the practice.       She had a followup echocardiogram today that shows preserved LV systolic function with an EF of 55%-60%.  She continues to show evidence of mild mitral regurgitation and trace to mild tricuspid regurgitation.  Her GLS is negative 20.4, which is within normal limits.  Again, this is overall stable to possibly slightly improved from her last echocardiogram.      CURRENT CARDIAC MEDICATIONS:   1.  Carvedilol 25 mg b.i.d.      The remainder of her medications, allergies and review of systems were reviewed and as are documented separately.      PHYSICAL EXAMINATION:   GENERAL:  The patient is a pleasant 64-year-old female who appears her stated age.  She is in no apparent distress.   VITAL SIGNS:  Blood pressure is 122/62.  Pulse is 66.  Weight is 157 pounds, which is stable.   PULMONARY:  Breathing is nonlabored.  Lungs are clear to auscultation.   CARDIAC:  Examination reveals a regular rate and rhythm, no murmur is appreciated.   NEUROLOGIC:  Alert and oriented.      ASSESSMENT AND PLAN:  Ms. Guzman is a very pleasant 64-year-old female with a history of recurrent metastatic right breast cancer.  Again, she previously underwent treatment with Adriamycin in 2006 and it was felt that she likely developed a mild chemo-induced cardiomyopathy after this.  Fortunately, with medical therapy, her EF has essentially normalized and stabilized.  She continues to get Herceptin, and we will continue every 3 month echos while she remains on this therapy.  She is scheduled for a mastectomy later this month, and I do not see any contraindication from a cardiac standpoint in having her move forward with the surgery as planned.      I will plan to see her back in late December, hopefully prior to my maternity leave, with a repeat echocardiogram prior to that office visit.  We will then plan her 3-month followup in the spring with Dr. Spencer.  Of course, I encouraged her to contact us sooner with any questions or concerns.      cc:    Zain Lua MD    [facility]    [address], [address]    [place]  [address]       Nino Castellano MD    [facility]    [address]    [place]  [address]         MELISA MCDONNELL PA-C             D: 10/10/2019   T: 10/10/2019   MT: LJ      Name:     KIMMIE PRICE   MRN:      [id number]        Account:      [id number]   :      1955           Service Date: 10/10/2019      Document: [id number]       Thank you for allowing me to participate in the care of your patient.      Sincerely,     Melisa Mcdonnell PA-C     [facility]    cc:   Melisa Mcdonnell PA-C  [facility]  [address] [place] [address]

## 2019-10-10 NOTE — PROGRESS NOTES
Please see separate dictation for HPI, PHYSICAL EXAM AND IMPRESSION/PLAN.    CURRENT MEDICATIONS:  Current Outpatient Medications   Medication Sig Dispense Refill     carvedilol (COREG) 25 MG tablet Take 1 tablet (25 mg) by mouth 2 times daily (with meals) 180 tablet 3     fulvestrant (FASLODEX) 250 MG/5ML injection Inject into the muscle every 30 days       levocetirizine (XYZAL) 5 MG tablet Take 2.5 mg by mouth every evening       ribociclib (KISQALI 600 DOSE) 200 MG tablet Take 600 mg by mouth daily Three weeks on, one week off - repeat       TRASTUZUMAB IV Chemo agent       Multiple Vitamins-Minerals (MULTIVITAMIN PO) Take by mouth daily         ALLERGIES:     Allergies   Allergen Reactions     Lisinopril Hives and Swelling     Venlafaxine Hives and Swelling     Possibly allergy       PAST MEDICAL HISTORY:  Past Medical History:   Diagnosis Date     Cancer (H)     breast     Cardiomyopathy (H)        PAST SURGICAL HISTORY:  Past Surgical History:   Procedure Laterality Date     BREAST SURGERY      March 2006       SOCIAL HISTORY:  Social History     Socioeconomic History     Marital status:      Spouse name: None     Number of children: None     Years of education: None     Highest education level: None   Occupational History     None   Social Needs     Financial resource strain: None     Food insecurity:     Worry: None     Inability: None     Transportation needs:     Medical: None     Non-medical: None   Tobacco Use     Smoking status: Never Smoker     Smokeless tobacco: Never Used   Substance and Sexual Activity     Alcohol use: No     Alcohol/week: 0.0 standard drinks     Drug use: No     Sexual activity: Yes     Partners: Male   Lifestyle     Physical activity:     Days per week: None     Minutes per session: None     Stress: None   Relationships     Social connections:     Talks on phone: None     Gets together: None     Attends Druze service: None     Active member of club or organization:  None     Attends meetings of clubs or organizations: None     Relationship status: None     Intimate partner violence:     Fear of current or ex partner: None     Emotionally abused: None     Physically abused: None     Forced sexual activity: None   Other Topics Concern     Parent/sibling w/ CABG, MI or angioplasty before 65F 55M? Not Asked   Social History Narrative     None       FAMILY HISTORY:  Family History   Problem Relation Age of Onset     Diabetes Mother      Breast Cancer Mother      Ovarian Cancer Mother      Hypertension Father      Breast Cancer Sister        Review of Systems:  Skin:  Negative       Eyes:  Positive for contacts    ENT:         Respiratory:  Negative       Cardiovascular:  Negative      Gastroenterology: Negative      Genitourinary:  Negative      Musculoskeletal:  Negative      Neurologic:  Positive for numbness or tingling of hands of the fingers and toes - due to chemo per pt  Psychiatric:  Negative      Heme/Lymph/Imm:  Negative      Endocrine:  Negative

## 2019-10-24 ENCOUNTER — HOSPITAL ENCOUNTER (OUTPATIENT)
Facility: CLINIC | Age: 64
Discharge: HOME | End: 2019-10-25
Attending: SURGERY | Admitting: SURGERY
Payer: MEDICARE

## 2019-10-24 ENCOUNTER — ANESTHESIA EVENT (OUTPATIENT)
Dept: SURGERY | Facility: CLINIC | Age: 64
End: 2019-10-24
Payer: MEDICARE

## 2019-10-24 ENCOUNTER — APPOINTMENT (OUTPATIENT)
Dept: SURGERY | Facility: PHYSICIAN GROUP | Age: 64
End: 2019-10-24
Payer: MEDICARE

## 2019-10-24 ENCOUNTER — ANESTHESIA (OUTPATIENT)
Dept: SURGERY | Facility: CLINIC | Age: 64
End: 2019-10-24
Payer: MEDICARE

## 2019-10-24 DIAGNOSIS — C50.911 MALIGNANT NEOPLASM OF RIGHT FEMALE BREAST, UNSPECIFIED ESTROGEN RECEPTOR STATUS, UNSPECIFIED SITE OF BREAST (H): ICD-10-CM

## 2019-10-24 DIAGNOSIS — C50.911 RIGHT BREAST CANCER WITH T3 TUMOR, >5 CM IN GREATEST DIMENSION (H): Primary | ICD-10-CM

## 2019-10-24 PROCEDURE — 00000158 ZZHCL STATISTIC H-FISH PROCESS B/S: Performed by: SURGERY

## 2019-10-24 PROCEDURE — 25800030 ZZH RX IP 258 OP 636: Performed by: SURGERY

## 2019-10-24 PROCEDURE — 25000132 ZZH RX MED GY IP 250 OP 250 PS 637: Mod: GY | Performed by: SURGERY

## 2019-10-24 PROCEDURE — 25000128 H RX IP 250 OP 636: Performed by: PHYSICIAN ASSISTANT

## 2019-10-24 PROCEDURE — 88360 TUMOR IMMUNOHISTOCHEM/MANUAL: CPT | Performed by: SURGERY

## 2019-10-24 PROCEDURE — 88360 TUMOR IMMUNOHISTOCHEM/MANUAL: CPT | Mod: 26 | Performed by: SURGERY

## 2019-10-24 PROCEDURE — 37000008 ZZH ANESTHESIA TECHNICAL FEE, 1ST 30 MIN: Performed by: SURGERY

## 2019-10-24 PROCEDURE — 19303 MAST SIMPLE COMPLETE: CPT | Mod: AS | Performed by: PHYSICIAN ASSISTANT

## 2019-10-24 PROCEDURE — 25800030 ZZH RX IP 258 OP 636: Performed by: NURSE ANESTHETIST, CERTIFIED REGISTERED

## 2019-10-24 PROCEDURE — 71000012 ZZH RECOVERY PHASE 1 LEVEL 1 FIRST HR: Performed by: SURGERY

## 2019-10-24 PROCEDURE — 88307 TISSUE EXAM BY PATHOLOGIST: CPT | Performed by: SURGERY

## 2019-10-24 PROCEDURE — 25000128 H RX IP 250 OP 636: Performed by: NURSE ANESTHETIST, CERTIFIED REGISTERED

## 2019-10-24 PROCEDURE — 25000128 H RX IP 250 OP 636: Performed by: SURGERY

## 2019-10-24 PROCEDURE — 00000159 ZZHCL STATISTIC H-SEND OUTS PREP: Performed by: SURGERY

## 2019-10-24 PROCEDURE — 36000058 ZZH SURGERY LEVEL 3 EA 15 ADDTL MIN: Performed by: SURGERY

## 2019-10-24 PROCEDURE — 37000009 ZZH ANESTHESIA TECHNICAL FEE, EACH ADDTL 15 MIN: Performed by: SURGERY

## 2019-10-24 PROCEDURE — 25800030 ZZH RX IP 258 OP 636: Performed by: ANESTHESIOLOGY

## 2019-10-24 PROCEDURE — 19303 MAST SIMPLE COMPLETE: CPT | Mod: RT | Performed by: SURGERY

## 2019-10-24 PROCEDURE — 40000306 ZZH STATISTIC PRE PROC ASSESS II: Performed by: SURGERY

## 2019-10-24 PROCEDURE — 27210794 ZZH OR GENERAL SUPPLY STERILE: Performed by: SURGERY

## 2019-10-24 PROCEDURE — 25000125 ZZHC RX 250: Performed by: NURSE ANESTHETIST, CERTIFIED REGISTERED

## 2019-10-24 PROCEDURE — 88307 TISSUE EXAM BY PATHOLOGIST: CPT | Mod: 26 | Performed by: SURGERY

## 2019-10-24 PROCEDURE — 88377 M/PHMTRC ALYS ISHQUANT/SEMIQ: CPT

## 2019-10-24 PROCEDURE — 36000056 ZZH SURGERY LEVEL 3 1ST 30 MIN: Performed by: SURGERY

## 2019-10-24 PROCEDURE — 25000125 ZZHC RX 250: Performed by: SURGERY

## 2019-10-24 RX ORDER — HYDROMORPHONE HYDROCHLORIDE 1 MG/ML
0.2 INJECTION, SOLUTION INTRAMUSCULAR; INTRAVENOUS; SUBCUTANEOUS
Status: DISCONTINUED | OUTPATIENT
Start: 2019-10-24 | End: 2019-10-25 | Stop reason: HOSPADM

## 2019-10-24 RX ORDER — FENTANYL CITRATE 50 UG/ML
INJECTION, SOLUTION INTRAMUSCULAR; INTRAVENOUS PRN
Status: DISCONTINUED | OUTPATIENT
Start: 2019-10-24 | End: 2019-10-24

## 2019-10-24 RX ORDER — ONDANSETRON 2 MG/ML
4 INJECTION INTRAMUSCULAR; INTRAVENOUS EVERY 30 MIN PRN
Status: DISCONTINUED | OUTPATIENT
Start: 2019-10-24 | End: 2019-10-24 | Stop reason: HOSPADM

## 2019-10-24 RX ORDER — CEFAZOLIN SODIUM 2 G/100ML
2 INJECTION, SOLUTION INTRAVENOUS
Status: COMPLETED | OUTPATIENT
Start: 2019-10-24 | End: 2019-10-24

## 2019-10-24 RX ORDER — HYDROMORPHONE HYDROCHLORIDE 1 MG/ML
.3-.5 INJECTION, SOLUTION INTRAMUSCULAR; INTRAVENOUS; SUBCUTANEOUS EVERY 10 MIN PRN
Status: DISCONTINUED | OUTPATIENT
Start: 2019-10-24 | End: 2019-10-24 | Stop reason: HOSPADM

## 2019-10-24 RX ORDER — NALOXONE HYDROCHLORIDE 0.4 MG/ML
.1-.4 INJECTION, SOLUTION INTRAMUSCULAR; INTRAVENOUS; SUBCUTANEOUS
Status: DISCONTINUED | OUTPATIENT
Start: 2019-10-24 | End: 2019-10-24 | Stop reason: HOSPADM

## 2019-10-24 RX ORDER — LIDOCAINE 40 MG/G
CREAM TOPICAL
Status: DISCONTINUED | OUTPATIENT
Start: 2019-10-24 | End: 2019-10-24 | Stop reason: HOSPADM

## 2019-10-24 RX ORDER — BUPIVACAINE HYDROCHLORIDE 2.5 MG/ML
INJECTION, SOLUTION INFILTRATION; PERINEURAL PRN
Status: DISCONTINUED | OUTPATIENT
Start: 2019-10-24 | End: 2019-10-24 | Stop reason: HOSPADM

## 2019-10-24 RX ORDER — ONDANSETRON 2 MG/ML
INJECTION INTRAMUSCULAR; INTRAVENOUS PRN
Status: DISCONTINUED | OUTPATIENT
Start: 2019-10-24 | End: 2019-10-24

## 2019-10-24 RX ORDER — ONDANSETRON 2 MG/ML
4 INJECTION INTRAMUSCULAR; INTRAVENOUS EVERY 6 HOURS PRN
Status: DISCONTINUED | OUTPATIENT
Start: 2019-10-24 | End: 2019-10-25 | Stop reason: HOSPADM

## 2019-10-24 RX ORDER — FENTANYL CITRATE 50 UG/ML
25-50 INJECTION, SOLUTION INTRAMUSCULAR; INTRAVENOUS
Status: DISCONTINUED | OUTPATIENT
Start: 2019-10-24 | End: 2019-10-24 | Stop reason: HOSPADM

## 2019-10-24 RX ORDER — MEPERIDINE HYDROCHLORIDE 50 MG/ML
12.5 INJECTION INTRAMUSCULAR; INTRAVENOUS; SUBCUTANEOUS
Status: DISCONTINUED | OUTPATIENT
Start: 2019-10-24 | End: 2019-10-24 | Stop reason: HOSPADM

## 2019-10-24 RX ORDER — CARVEDILOL 25 MG/1
25 TABLET ORAL 2 TIMES DAILY WITH MEALS
Status: DISCONTINUED | OUTPATIENT
Start: 2019-10-24 | End: 2019-10-25 | Stop reason: HOSPADM

## 2019-10-24 RX ORDER — MAGNESIUM HYDROXIDE 1200 MG/15ML
LIQUID ORAL PRN
Status: DISCONTINUED | OUTPATIENT
Start: 2019-10-24 | End: 2019-10-24 | Stop reason: HOSPADM

## 2019-10-24 RX ORDER — LIDOCAINE HYDROCHLORIDE 10 MG/ML
INJECTION, SOLUTION INFILTRATION; PERINEURAL PRN
Status: DISCONTINUED | OUTPATIENT
Start: 2019-10-24 | End: 2019-10-24

## 2019-10-24 RX ORDER — GLYCOPYRROLATE 0.2 MG/ML
INJECTION, SOLUTION INTRAMUSCULAR; INTRAVENOUS PRN
Status: DISCONTINUED | OUTPATIENT
Start: 2019-10-24 | End: 2019-10-24

## 2019-10-24 RX ORDER — SODIUM CHLORIDE, SODIUM LACTATE, POTASSIUM CHLORIDE, CALCIUM CHLORIDE 600; 310; 30; 20 MG/100ML; MG/100ML; MG/100ML; MG/100ML
INJECTION, SOLUTION INTRAVENOUS CONTINUOUS
Status: DISCONTINUED | OUTPATIENT
Start: 2019-10-24 | End: 2019-10-24 | Stop reason: HOSPADM

## 2019-10-24 RX ORDER — CEFAZOLIN SODIUM 1 G/50ML
1 INJECTION, SOLUTION INTRAVENOUS EVERY 8 HOURS
Status: COMPLETED | OUTPATIENT
Start: 2019-10-24 | End: 2019-10-25

## 2019-10-24 RX ORDER — DEXAMETHASONE SODIUM PHOSPHATE 4 MG/ML
INJECTION, SOLUTION INTRA-ARTICULAR; INTRALESIONAL; INTRAMUSCULAR; INTRAVENOUS; SOFT TISSUE PRN
Status: DISCONTINUED | OUTPATIENT
Start: 2019-10-24 | End: 2019-10-24

## 2019-10-24 RX ORDER — PROPOFOL 10 MG/ML
INJECTION, EMULSION INTRAVENOUS CONTINUOUS PRN
Status: DISCONTINUED | OUTPATIENT
Start: 2019-10-24 | End: 2019-10-24

## 2019-10-24 RX ORDER — ONDANSETRON 4 MG/1
4 TABLET, ORALLY DISINTEGRATING ORAL EVERY 30 MIN PRN
Status: DISCONTINUED | OUTPATIENT
Start: 2019-10-24 | End: 2019-10-24 | Stop reason: HOSPADM

## 2019-10-24 RX ORDER — CEFAZOLIN SODIUM 1 G/3ML
1 INJECTION, POWDER, FOR SOLUTION INTRAMUSCULAR; INTRAVENOUS SEE ADMIN INSTRUCTIONS
Status: DISCONTINUED | OUTPATIENT
Start: 2019-10-24 | End: 2019-10-24 | Stop reason: HOSPADM

## 2019-10-24 RX ORDER — PROPOFOL 10 MG/ML
INJECTION, EMULSION INTRAVENOUS PRN
Status: DISCONTINUED | OUTPATIENT
Start: 2019-10-24 | End: 2019-10-24

## 2019-10-24 RX ORDER — ONDANSETRON 4 MG/1
4 TABLET, ORALLY DISINTEGRATING ORAL EVERY 6 HOURS PRN
Status: DISCONTINUED | OUTPATIENT
Start: 2019-10-24 | End: 2019-10-25 | Stop reason: HOSPADM

## 2019-10-24 RX ORDER — SODIUM CHLORIDE 9 MG/ML
INJECTION, SOLUTION INTRAVENOUS CONTINUOUS
Status: DISCONTINUED | OUTPATIENT
Start: 2019-10-24 | End: 2019-10-25 | Stop reason: HOSPADM

## 2019-10-24 RX ORDER — NALOXONE HYDROCHLORIDE 0.4 MG/ML
.1-.4 INJECTION, SOLUTION INTRAMUSCULAR; INTRAVENOUS; SUBCUTANEOUS
Status: DISCONTINUED | OUTPATIENT
Start: 2019-10-24 | End: 2019-10-25 | Stop reason: HOSPADM

## 2019-10-24 RX ORDER — ACETAMINOPHEN 325 MG/1
650 TABLET ORAL EVERY 6 HOURS PRN
Status: DISCONTINUED | OUTPATIENT
Start: 2019-10-24 | End: 2019-10-25 | Stop reason: HOSPADM

## 2019-10-24 RX ORDER — OXYCODONE HYDROCHLORIDE 5 MG/1
5 TABLET ORAL EVERY 4 HOURS PRN
Status: DISCONTINUED | OUTPATIENT
Start: 2019-10-24 | End: 2019-10-25 | Stop reason: HOSPADM

## 2019-10-24 RX ORDER — LIDOCAINE 40 MG/G
CREAM TOPICAL
Status: DISCONTINUED | OUTPATIENT
Start: 2019-10-24 | End: 2019-10-25 | Stop reason: HOSPADM

## 2019-10-24 RX ADMIN — LIDOCAINE HYDROCHLORIDE 30 MG: 10 INJECTION, SOLUTION INFILTRATION; PERINEURAL at 10:27

## 2019-10-24 RX ADMIN — SODIUM CHLORIDE, POTASSIUM CHLORIDE, SODIUM LACTATE AND CALCIUM CHLORIDE: 600; 310; 30; 20 INJECTION, SOLUTION INTRAVENOUS at 09:40

## 2019-10-24 RX ADMIN — FENTANYL CITRATE 100 MCG: 50 INJECTION, SOLUTION INTRAMUSCULAR; INTRAVENOUS at 10:27

## 2019-10-24 RX ADMIN — CARVEDILOL 25 MG: 25 TABLET, FILM COATED ORAL at 18:10

## 2019-10-24 RX ADMIN — MIDAZOLAM 2 MG: 1 INJECTION INTRAMUSCULAR; INTRAVENOUS at 10:20

## 2019-10-24 RX ADMIN — HYDROMORPHONE HYDROCHLORIDE 0.5 MG: 1 INJECTION, SOLUTION INTRAMUSCULAR; INTRAVENOUS; SUBCUTANEOUS at 10:46

## 2019-10-24 RX ADMIN — HYDROMORPHONE HYDROCHLORIDE 0.2 MG: 1 INJECTION, SOLUTION INTRAMUSCULAR; INTRAVENOUS; SUBCUTANEOUS at 14:40

## 2019-10-24 RX ADMIN — PHENYLEPHRINE HYDROCHLORIDE 100 MCG: 10 INJECTION INTRAVENOUS at 10:36

## 2019-10-24 RX ADMIN — HYDROMORPHONE HYDROCHLORIDE 0.2 MG: 1 INJECTION, SOLUTION INTRAMUSCULAR; INTRAVENOUS; SUBCUTANEOUS at 18:19

## 2019-10-24 RX ADMIN — CEFAZOLIN SODIUM 2 G: 2 INJECTION, SOLUTION INTRAVENOUS at 10:33

## 2019-10-24 RX ADMIN — CEFAZOLIN SODIUM 1 G: 1 INJECTION, SOLUTION INTRAVENOUS at 18:11

## 2019-10-24 RX ADMIN — PROPOFOL 50 MCG/KG/MIN: 10 INJECTION, EMULSION INTRAVENOUS at 10:33

## 2019-10-24 RX ADMIN — DEXAMETHASONE SODIUM PHOSPHATE 4 MG: 4 INJECTION, SOLUTION INTRA-ARTICULAR; INTRALESIONAL; INTRAMUSCULAR; INTRAVENOUS; SOFT TISSUE at 10:27

## 2019-10-24 RX ADMIN — GLYCOPYRROLATE 0.2 MG: 0.2 INJECTION, SOLUTION INTRAMUSCULAR; INTRAVENOUS at 10:27

## 2019-10-24 RX ADMIN — SODIUM CHLORIDE: 9 INJECTION, SOLUTION INTRAVENOUS at 14:08

## 2019-10-24 RX ADMIN — HYDROMORPHONE HYDROCHLORIDE 0.5 MG: 1 INJECTION, SOLUTION INTRAMUSCULAR; INTRAVENOUS; SUBCUTANEOUS at 10:52

## 2019-10-24 RX ADMIN — ONDANSETRON HYDROCHLORIDE 4 MG: 2 INJECTION, SOLUTION INTRAVENOUS at 11:07

## 2019-10-24 RX ADMIN — PROPOFOL 160 MG: 10 INJECTION, EMULSION INTRAVENOUS at 10:27

## 2019-10-24 ASSESSMENT — MIFFLIN-ST. JEOR
SCORE: 1336.53
SCORE: 1350.14

## 2019-10-24 ASSESSMENT — ENCOUNTER SYMPTOMS: DYSRHYTHMIAS: 0

## 2019-10-24 NOTE — ANESTHESIA POSTPROCEDURE EVALUATION
Patient: Lorna KERI Thomas    Procedure(s):  RIGHT MASTECTOMY    Diagnosis:Malignant neoplasm of right female breast, unspecified estrogen receptor status, unspecified site of breast (H) [C50.911]  Diagnosis Additional Information: No value filed.    Anesthesia Type:  General, ETT    Note:  Anesthesia Post Evaluation    Patient location during evaluation: PACU  Patient participation: Able to fully participate in evaluation  Level of consciousness: awake  Pain management: adequate  Airway patency: patent  Cardiovascular status: acceptable  Respiratory status: acceptable  Hydration status: euvolemic  PONV: controlled     Anesthetic complications: None          Last vitals:  Vitals:    10/24/19 1310 10/24/19 1415 10/24/19 1426   BP: (!) 144/62  124/59   Pulse:   64   Resp: 18 18 13   Temp: 97.4  F (36.3  C)  98.1  F (36.7  C)   SpO2:   93%         Electronically Signed By: Michael Hendricks MD  October 24, 2019  2:46 PM

## 2019-10-24 NOTE — PHARMACY-ADMISSION MEDICATION HISTORY
Medication history and patient interview completed by pre-admitting RN.  Reviewed by pharmacist, including [employer] dispense records and chart review.       Yakov Schofield, Pharm.D.        Status Changed by Time of change   Nurse Marsha Treviño, ABRAM Fri Oct 18, 2019  2:21 PM     Prior to Admission medications    Medication Sig Last Dose Taking? Auth Provider   carvedilol (COREG) 25 MG tablet Take 1 tablet (25 mg) by mouth 2 times daily (with meals) 10/24/2019 at 0600 Yes Melisa Billingsley PA-C   fulvestrant (FASLODEX) 250 MG/5ML injection Inject into the muscle every 30 days 9/30/2019 Yes Reported, Patient   levocetirizine (XYZAL) 5 MG tablet Take 2.5 mg by mouth every evening  Yes Reported, Patient   ribociclib (KISQALI 600 DOSE) 200 MG tablet Take 600 mg by mouth daily Three weeks on, one week off - repeat 10/22/2019 Yes Reported, Patient   trastuzumab (HERCEPTIN) 150 MG SOLR injection Inject into the vein every 21 days Chemo agent / HERCEPTIN 10/10/2019 Yes Reported, Patient   Multiple Vitamins-Minerals (MULTIVITAMIN PO) Take by mouth daily Unknown at Unknown time  Reported, Patient

## 2019-10-24 NOTE — OP NOTE
General Surgery Operative Note      Pre-operative diagnosis: Right breast invasive ductal carcinoma   Post-operative diagnosis: Same   Procedure: Right total mastectomy with advancement flap closure   Surgeon: Nino Castellano MD   Assistant(s): Katharine Rodriguez PA-C  The Physician Assistant was medically necessary for their expertise in prepping, camera management, suctioning, suturing and retraction.   Anesthesia: General    Estimated blood loss:   20 cc     Specimens: ID Type Source Tests Collected by Time Destination   A : Right Breast Mastectomy.  Stitch at 12:00 Tissue Breast, Right SURGICAL PATHOLOGY EXAM Nino Castellano MD 10/24/2019 10:54 AM           DESCRIPTION OF PROCEDURE: The patient was taken to the operating room and placed on the table in supine position. The right chest, breast, and axilla were prepped and draped in standard sterile fashion. A transversely oriented incision was drawn with rotational flap component medially.  This incision was fashioned to include removal of the involved ulcerated skin and skin fixed to the tumor. The incision was carried down into the subcutaneous tissue. Flaps were raised superiorly, medially, inferiorly and laterally down to the chest wall, thereby completely excising the breast tissue. Hemostasis was maintained with electrocautery.The breast was then lifted from the pectoralis muscle including the pectoralis fascia using Bovie electrocautery. The breast was marked with a suture at 12 o'clock. The breast was passed off the field as specimen. We inspected the field carefully for hemostasis and irrigated with sterile saline. A 15 fr round JACOB was placed under the flaps and brought out through a lateral stab incision.  To achieve closure without tension, the inferior flap was further mobilized well below the inframammary crease, extending mobilization almost to the costal margin.  This allowed opposition of the superior and inferior flap without significant tension.   The superior flap had already been mobilized up to the clavicle.  Once we were satisfied, we closed the deep dermis with interrupted 3-0 vicryl suture and the skin with a running 4-0 Vicryl subcuticular suture.  The flaps appeared to have excellent viability.  Steri-strips were applied. The patient tolerated the procedure well. Sponge and instrument counts were correct.   Nino Castellano MD

## 2019-10-24 NOTE — ANESTHESIA PREPROCEDURE EVALUATION
Anesthesia Pre-Procedure Evaluation    Patient: Kimmie Price   MRN: [id number] : 1955          Preoperative Diagnosis: Malignant neoplasm of right female breast, unspecified estrogen receptor status, unspecified site of breast (H) [C50.911]    Procedure(s):  RIGHT MASTECTOMY  SEED LOCALIZED RIGHT AXILLARY NODE BIOPSY    Past Medical History:   Diagnosis Date     Cancer (H)     breast     Cardiomyopathy (H)      Past Surgical History:   Procedure Laterality Date     BREAST SURGERY      2006     Anesthesia Evaluation     .             ROS/MED HX    ENT/Pulmonary:      (-) asthma, sleep apnea and Other pulmonary disease   Neurologic:      (-) TIA, Other neuro hx and Delerium   Cardiovascular: Comment: [facility]  Echocardiography Laboratory  [address] [place] [address]        Name: KIMMIE PRICE  MRN: [id number]  : 1955  Study Date: 10/10/2019 08:34 AM  Age: 64 yrs  Gender: Female  Patient Location: [facility]  Reason For Study: Secondary cardiomyopathy (H)  Ordering Physician: ANGEL MCDONNELL  Referring Physician: Zain Lua MD  Performed By: Reta Brewer RDCS     BSA: 1.9 m2  Height: 69 in  Weight: 157 lb  HR: 69  BP: 114/70 mmHg  _____________________________________________________________________________  __        Procedure  Limited Echo Adult.  _____________________________________________________________________________  __        Interpretation Summary     There is mild (1+) mitral regurgitation.  There is trace to mild tricuspid regurgitation.  The visual ejection fraction is estimated at 55-60%.  There is mild biatrial enlargement.  Compared to prior study, changes are noted.    (+) ----. : . CHF etiology: Chemo induced CM. Lowest EF 47%. Stable on Correg . . :. .      (-) hypertension, CAD, GRAHAM, arrhythmias, pulmonary hypertension and dyslipidemia   METS/Exercise Tolerance:     Hematologic:         Musculoskeletal:         GI/Hepatic:         (-) GERD and hepatitis   Renal/Genitourinary:      (-) renal disease   Endo:      (-) Type I DM, Type II DM, thyroid disease, chronic steroid usage, other endocrine disorder and obesity   Psychiatric:        (-) psychiatric history   Infectious Disease:  - neg infectious disease ROS       Malignancy:   (+) Malignancy History of Breast  Breast CA Active status post Chemo.         Other:    - neg other ROS                      Physical Exam      Airway   Mallampati: II  TM distance: >3 FB  Neck ROM: full    Dental     Cardiovascular   Rhythm and rate: regular and normal  (-) no murmur    Pulmonary    breath sounds clear to auscultation    Other findings: Lab Test        06/15/17     04/13/17     02/07/17                                                           1130          WBC          4.3          5.3           --           HGB          12.8         11.5          --           MCV          97            --           --           PLT          140          227          214           INR           --           --          0.90           Lab Test        06/15/17     04/14/17     04/13/17     02/27/17                                                                             0812          NA           143          141          141          139           POTASSIUM    4.7          4.7          4.7          4.4           CHLORIDE     106          107          107          107           CO2          25           27           27           22            BUN          12           16  16      16           15            CR           0.88         0.81         0.81         0.71          ANIONGAP      --           --           --          10            CAROLYN          9.8          9.3          9.3          8.4*          GLC          100          98           98           106*                Lab Results   Component Value Date    WBC 4.3 06/15/2017    HGB 12.8 06/15/2017    HCT 39.9 06/15/2017     06/15/2017      "06/15/2017    POTASSIUM 4.7 06/15/2017    CHLORIDE 106 06/15/2017    CO2 25 06/15/2017    BUN 12 06/15/2017    CR 0.88 06/15/2017     06/15/2017    CAROLYN 9.8 06/15/2017    ALBUMIN 4.1 06/15/2017    PROTTOTAL 6.7 06/15/2017    ALT 15 06/15/2017    AST 15 06/15/2017    ALKPHOS 28 06/15/2017    BILITOTAL 0.5 06/15/2017    LIPASE 94 06/03/2011    INR 0.90 02/07/2017       Preop Vitals  BP Readings from Last 3 Encounters:   10/24/19 127/70   10/10/19 122/62   10/07/19 122/82    Pulse Readings from Last 3 Encounters:   10/24/19 67   10/10/19 66   10/07/19 60      Resp Readings from Last 3 Encounters:   10/24/19 18   10/07/19 16   02/07/17 18    SpO2 Readings from Last 3 Encounters:   10/24/19 98%   10/10/19 97%   10/07/19 99%      Temp Readings from Last 1 Encounters:   10/24/19 97.7  F (36.5  C) (Temporal)    Ht Readings from Last 1 Encounters:   10/24/19 1.753 m (5' 9\")      Wt Readings from Last 1 Encounters:   10/24/19 71.2 kg (157 lb)    Estimated body mass index is 23.18 kg/m  as calculated from the following:    Height as of this encounter: 1.753 m (5' 9\").    Weight as of this encounter: 71.2 kg (157 lb).       Anesthesia Plan      History & Physical Review  History and physical reviewed and following examination; no interval change.    ASA Status:  3 .    NPO Status:  > 8 hours    Plan for General and ETT with Propofol induction. Maintenance will be Balanced.    PONV prophylaxis:  Ondansetron (or other 5HT-3)       Postoperative Care  Postoperative pain management:  IV analgesics and Oral pain medications.      Consents  Anesthetic plan, risks, benefits and alternatives discussed with:  Patient.  Use of blood products discussed: Yes.   Use of blood products discussed with Patient.  Consented to blood products.  .                 Michael Hendricks MD                    .  "

## 2019-10-24 NOTE — ANESTHESIA CARE TRANSFER NOTE
Patient: Lorna A Thomas    Procedure(s):  RIGHT MASTECTOMY    Diagnosis: Malignant neoplasm of right female breast, unspecified estrogen receptor status, unspecified site of breast (H) [C50.911]  Diagnosis Additional Information: No value filed.    Anesthesia Type:   General, ETT     Note:  Airway :Face Mask  Patient transferred to:PACU  Comments: Patient with spontaneous respirations and adequate tidal volumes. Patient awake and responsive. LMA removed in OR to 8 L face tent. To PACU ventilating well. VSS. Report given.Handoff Report: Identifed the Patient, Identified the Reponsible Provider, Reviewed the pertinent medical history, Discussed the surgical course, Reviewed Intra-OP anesthesia mangement and issues during anesthesia, Set expectations for post-procedure period and Allowed opportunity for questions and acknowledgement of understanding      Vitals: (Last set prior to Anesthesia Care Transfer)    CRNA VITALS  10/24/2019 1108 - 10/24/2019 1145      10/24/2019             NIBP:  110/66    NIBP Mean:  81                Electronically Signed By: TIFFANI Hein CRNA  October 24, 2019  11:45 AM

## 2019-10-24 NOTE — PROGRESS NOTES
PRIMARY DIAGNOSIS: ACUTE PAIN  OUTPATIENT/OBSERVATION GOALS TO BE MET BEFORE DISCHARGE:  1. Pain Status: 4/10, IV pain medication given    2. Return to near baseline physical activity: Yes    3. Cleared for discharge by consultants (if involved): No    Discharge Planner Nurse   Safe discharge environment identified: Yes  Barriers to discharge: Yes       Entered by: Emily Stafford 10/24/2019 2:52 PM     Please review provider order for any additional goals.   Nurse to notify provider when observation goals have been met and patient is ready for discharge.    Patient arrived to floor from PACU at 1330. Alert and oriented x4. Up SBA. Pain 4/10, IV pain medication given x1, decrease in pain reported. Capno in place, on RA. R breast dressing CDI, JACOB in place, bright red blood output. Voiding good amounts on floor. Tolerating clear liquid diet. IV fluids running. Family at bedside and updated with patients POC.

## 2019-10-25 VITALS
HEIGHT: 69 IN | HEART RATE: 72 BPM | RESPIRATION RATE: 16 BRPM | WEIGHT: 160 LBS | SYSTOLIC BLOOD PRESSURE: 124 MMHG | DIASTOLIC BLOOD PRESSURE: 48 MMHG | OXYGEN SATURATION: 95 % | TEMPERATURE: 98.6 F | BODY MASS INDEX: 23.7 KG/M2

## 2019-10-25 LAB — GLUCOSE BLDC GLUCOMTR-MCNC: 116 MG/DL (ref 70–99)

## 2019-10-25 PROCEDURE — 25000132 ZZH RX MED GY IP 250 OP 250 PS 637: Mod: GY | Performed by: SURGERY

## 2019-10-25 PROCEDURE — 82962 GLUCOSE BLOOD TEST: CPT

## 2019-10-25 PROCEDURE — 25000128 H RX IP 250 OP 636: Performed by: SURGERY

## 2019-10-25 RX ORDER — HYDROCODONE BITARTRATE AND ACETAMINOPHEN 5; 325 MG/1; MG/1
1 TABLET ORAL EVERY 6 HOURS PRN
Qty: 20 TABLET | Refills: 0 | Status: SHIPPED | OUTPATIENT
Start: 2019-10-25 | End: 2020-10-06

## 2019-10-25 RX ORDER — HYDROCODONE BITARTRATE AND ACETAMINOPHEN 5; 325 MG/1; MG/1
1 TABLET ORAL EVERY 6 HOURS PRN
Status: DISCONTINUED | OUTPATIENT
Start: 2019-10-25 | End: 2019-10-25 | Stop reason: HOSPADM

## 2019-10-25 RX ADMIN — CARVEDILOL 25 MG: 25 TABLET, FILM COATED ORAL at 09:47

## 2019-10-25 RX ADMIN — CEFAZOLIN SODIUM 1 G: 1 INJECTION, SOLUTION INTRAVENOUS at 01:32

## 2019-10-25 RX ADMIN — ACETAMINOPHEN 650 MG: 325 TABLET, FILM COATED ORAL at 09:52

## 2019-10-25 RX ADMIN — HYDROMORPHONE HYDROCHLORIDE 0.2 MG: 1 INJECTION, SOLUTION INTRAMUSCULAR; INTRAVENOUS; SUBCUTANEOUS at 01:32

## 2019-10-25 RX ADMIN — CEFAZOLIN SODIUM 1 G: 1 INJECTION, SOLUTION INTRAVENOUS at 09:50

## 2019-10-25 NOTE — PROGRESS NOTES
"[facility]  General Surgery Progress Note         Assessment and Plan:   Assessment:   POD#1 s/p Procedure(s):  RIGHT MASTECTOMY  Afebrile      Plan:   -OK to transition to oral pain control, patient is requesting [place]  -OK to DC today.   -DC Rx: Norco.    -OTC bowel program prn.    -RTC for drain removal when output is less than 30ml/24 hours and again in 2-3 weeks for postop appt.    -Discharge instructions were reviewed with the patient in detail.  All her questions/concerns were addressed.  She is aware that a printed copy of instructions will be given to her upon discharge.         Interval History:   Comfortable in bed, family at bedside. Reports pain is well controlled, will transition to PO pain control this morning and if tolerates, would like to discharge home. Tolerating diet, up walking, voiding independently.        Physical Exam:   Blood pressure 139/67, pulse 72, temperature 97.8  F (36.6  C), temperature source Oral, resp. rate 16, height 1.753 m (5' 9\"), weight 72.6 kg (160 lb), SpO2 96 %.    I/O last 3 completed shifts:  In: 3261.4 [P.O.:1570; I.V.:1691.4]  Out: 1520 [Urine:1450; Drains:50; Blood:20]    Chest - flat, no seroma/hematoma.  Incisions - clean, dry, steristrips intact.  No erythema.  Drains - [place]on[place] - serosanguinous, minima., no clots.  No leakage around sites.          Data:     Recent Labs   Lab Test 06/15/17 04/13/17   HGB 12.8 11.5       Telma Stafford PA-C     "

## 2019-10-25 NOTE — PLAN OF CARE
Up walking in woo and sitting in chair with assist of stand by. Tolerating clears, advanced to fulls for am. IV saline locked. Complains of right chest pain. Dilaudid for comfort. Education started for JACOB care at home, see previous progress note.

## 2019-10-25 NOTE — PLAN OF CARE
POD 1 for right mastectomy  Dressing CDI, JACOB in place  Vital signs stable, on RA, capnography in place  PIV saline locked, continues Ancef   IV Dilaudid x 1  Up with SBA, voiding without difficulty   Possible discharge home today

## 2019-10-25 NOTE — PROGRESS NOTES
Pt given printout from [facility] for your JACOB drain after discharge. Procedure demonstrated for patient as well as the stripping of drain. Discussed need to record day, time and volume emptied from JACOB when at home and to bring this journal to follow up appointment. Report given to next shift to continue education and have pt demonstrate care of JACOB.

## 2019-10-25 NOTE — PLAN OF CARE
Patient's After Visit Summary was reviewed with patient and/or spouse and daughter.   Patient verbalized understanding of After Visit Summary, recommended follow up and was given an opportunity to ask questions.   Discharge medications sent home with patient/family: YES   Discharged with spouse and daughter      VSS. A and Ox4. Pain managed with tylenol. Dressing CDI. Pt. Completed JACOB drain cares independently, no questions or concerns.

## 2019-10-25 NOTE — DISCHARGE INSTRUCTIONS
HOME CARE FOLLOWING MASTECTOMY  OCTAVIO Null, DEMARCUS Francois R. O Donnell, J. Shaheen     Special instructions for Lorna Guzman:  --Call for appointment for drain removal when output is less than 30mL/24 hours consistently  [phone]         APPOINTMENT WITH YOUR SURGEON:  All patients are to schedule a post-op appointment with their general surgeon.  Once you are home, call the office to schedule the appointment for 2-3 weeks from the date of your surgery.  You may need to be seen sooner if you have a drain in place, especially if you are not being seen by plastic surgery.      Appointments to see your general surgeon at any of our locations can be made by calling:  #[phone].      If you have not yet received your final pathology report from your surgery, you will receive a phone call from your surgeon with these results.  You will be able to ask questions and receive more in-depth explanation at your post-op appointment.  This appointment will also be when you discuss further evaluation, treatment, and referral to oncology and/or radiation oncology if this is necessary.    DRAINS:  If you have a drain in place, you will need a separate appointment with a nurse in the office to have this removed.  You will have a form to keep track of the output of your drain.  When the output reaches a point where the total for a 24 hour period is less than 30cc s, you are ready to have the drain removed.  At that point you can call the office and talk to the nurse to arrange coming into the office to have this done.  If you have more than one drain in place, they may not all be removed at the same time, as the outputs can be very different from each.  The nurse will help you to manage this and help decide when the remaining drains will be taken out.    INCISIONAL CARE:  The incision:  The stitches at your incision dissolve over time.  The only stitches that need to be removed are those  holding the drain in place.  This will be removed at the same time as the drain.  You may have steri-strips (thin strips of tape) or dermabond (a type of surgical glue that appears as a shiny substance) on your incision.  Steri-strips may eventually start to peel up at the ends and fall off; otherwise they may be removed by you or your surgeon 10-14 days after your surgery.  Dermabond may eventually become flaky and wears off in the 2-4 weeks following surgery.      Drainage and bandages:  It is normal to have some drainage from the incisions initially after surgery (unless you have dermabond in place).  This amount should gradually decrease over time.  Incisions and drain sites should be covered with a bandage if there is any drainage present.  If none is present, a bandage is not required, but may still be applied if it is more comfortable to have one in place (example: clothing rubbing on incision).  After a drain has been removed, you may have drainage from the site.  It is recommended to keep a bandage over this site until all drainage has stopped.     BATHING:  You are allowed to bath (shallow water in a tub only) or shower 24-48 hours after your surgery.  Mild soap is OK to use near these sites.  When bathing, do not allow the incision or drain site to become submersed in water as this may increase the risk of infection.    DIET:  No restrictions.  Increased fluid intake is recommended. While taking pain medications, increase dietary fiber or add a fiber supplementation like Metamucil or Citrucel to help prevent constipation - a possible side effect of pain medications.    DISCOMFORT:  Begin taking pain pills before discomfort is severe.  When possible, take pain medication with food to minimize side effects.  Intermittent use of ice packs may help during the first 48 hours.  Expect gradual improvement.    ACTIVITY:  You may feel like resting more after surgery.  Rest when you feel it is needed.  You may  climb stairs and do light activities or housework, but be careful not to overdo.  Minimize reaching overhead initially after surgery.  Pace yourself to allow a gradual increase in activity.  Driving is NOT recommended until you feel comfortable and safe driving AND are off all narcotic pain medication.  You may resume sexual activity as soon as you feel comfortable doing so, but prevent any pressure on the incisional area.      If you had a  sentinel node biopsy  at the time of your surgery:  You have no restrictions in addition to those noted above.    If you had an  axillary node dissection  at the time of your surgery:  You are recommended to restrict the activity of the arm on the side the dissection was done on.  This means:  no reaching overhead until cleared to do so by your surgeon, no carrying weight on that side greater than a couple pounds, no injections/vaccinations/ blood samples from that side, and no blood pressure measurements on that side.  It is also recommended to elevate the arm on pillows several times a day to decrease/minimize swelling, and avoid sleeping on that arm.    PROSTHESIS/RECONSTRUCTION:  Do not feel pressured to wear a prosthesis or undergo reconstruction if you would prefer not to.  It is definitely your choice and we will assist you in any way we can.    If you are unable to have reconstruction, or have chosen not to have reconstruction at this time, you may have a prosthesis:  You may get a special camisole in the hospital that also comes with filled inserts to use as a temporary prosthesis.  The inserts are adjustable in size (simply remove stuffing to size) and are completely optional for your use.  They are used in this setting as they do not put extra pressure on your incision.  The camisole has a Velcro pocket on each side that the insert would fit into.  This pocket is also a good spot to place your drain bulb and tubing, thereby minimizing the chance of the tubing   catching  on clothing, etc, as you move about.  After you are further along in the healing process, you will be given appropriate prescriptions for a prosthesis that will be professionally fitted for you.    If you have had reconstructive surgery at the time of your mastectomy:  The above also applies regarding the camisole and temporary prosthesis.  See above.  The main difference for you is that your plastic surgeon will be following up with you regarding your incisions, removal of drains, and any post-op questions you have about healing.  Your general surgeon will still contact you about your final pathology report and see you back for a post-op appointment (see above), but this can wait until after all of your drains have been removed by your plastic surgeon.           CONTACT US IF THE FOLLOWING DEVELOPS:   1. A fever that is above 101     2. If there is a large amount of drainage, bleeding, or swelling.   3. Severe pain that is not relieved by your prescription.   4. Drainage that is thick, cloudy, yellow, green or white.   5. Any other questions not answered by  Frequently Asked Questions  sheet.        FREQUENTLY ASKED QUESTIONS:    Q:  How should my incision look?    A:  Normally your incision will appear slightly swollen with light redness directly along the incision itself as it heals.  It may feel like a bump or ridge as the healing/scarring happens, and over time (3-4 months) this bump or ridge feeling should slowly go away.  In general, clear or pink watery drainage can be normal at first as your incision heals, but should decrease over time.    Q:  How do I know if my incision is infected?  A:  Look at your incision for signs of infection, like redness around the incision spreading to surrounding skin, or drainage of cloudy or foul-smelling drainage.  If you feel warm, check your temperature to see if you are running a fever.    **If any of these things occur, please notify the nurse at our office.   We may need you to come into the office for an incision check.      Q:  How do I take care of my incision?  A:  If you have a dressing in place - Starting the day after surgery, replace the dressing 1-2 times a day until there is no further drainage from the incision.  At that time, a dressing is no longer needed.  Try to minimize tape on the skin if irritation is occurring at the tape sites.  If you have significant irritation from tape on the skin, please call the office to discuss other method of dressing your incision.    Small pieces of tape called  steri-strips  may be present directly overlying your incision; these may be removed 10 days after surgery unless otherwise specified by your surgeon.  If these tapes start to loosen at the ends, you may trim them back until they fall off or are removed.    A:  If you had  Dermabond  tissue glue used as a dressing (this causes your incision to look shiny with a clear covering over it) - This type of dressing wears off with time and does not require more dressings over the top unless it is draining around the glue as it wears off.  Do not apply ointments or lotions over the incisions until the glue has completely worn off.    Q:  There is a piece of tape or a sticky  lead  still on my skin.  Can I remove this?  A:  Sometimes the sticky  leads  used for monitoring during surgery or for evaluation in the emergency department are not all removed while you are in the hospital.  These sometimes have a tab or metal dot on them.  You can easily remove these on your own, like taking off a band-aid.  If there is a gel substance under the  lead , simply wipe/clean it off with a washcloth or paper towel.      Q:  What can I do to minimize constipation (very hard stools, or lack of stools)?  A:  Stay well hydrated.  Increase your dietary fiber intake or take a fiber supplement -with plenty of water.  Walk around frequently.  You may consider an over-the-counter stool-softener.   Your Pharmacist can assist you with choosing one that is stocked at your pharmacy.  Constipation is also one of the most common side effects of pain medication.  If you are using pain medication, be pro-active and try to PREVENT problems with constipation by taking the steps above BEFORE constipation becomes a problem.    Q:  What do I do if I need more pain medications?  A:  Call the office to receive refills.  Be aware that certain pain meds cannot be called into a pharmacy and actually require a paper prescription.  A change may be made in your pain med as you progress thru your recovery period or if you have side effects to certain meds.    --Pain meds are NOT refilled after 5pm on weekdays, and NOT AT ALL on the weekends, so please look ahead to prevent problems.      Q:  Why am I having a hard time sleeping now that I am at home?  A:  Many medications you receive while you are in the hospital can impact your sleep for a number of days after your surgery/hospitalization.  Decreased level of activity and naps during the day may also make sleeping at night difficult.  Try to minimize day-time naps, and get up frequently during the day to walk around your home during your recovery time.  Sleep aides may be of some help, but are not recommended for long-term use.      Q:  I am having some back discomfort.  What should I do?  A:  This may be related to certain positioning that was required for your surgery, extended periods of time in bed, or other changes in your overall activity level.  You may try ice, heat, acetaminophen, or ibuprofen to treat this temporarily.  Note that many pain medications have acetaminophen in them and would state this on the prescription bottle.  Be sure not to exceed the maximum of 4000mg per day of acetaminophen.     **If the pain you are having does not resolve, is severe, or is a flare of back pain you have had on other occasions prior to surgery, please contact your primary physician  for further recommendations or for an appointment to be examined at their office.    Q:  Why am I having headaches?  A:  Headaches can be caused by many things:  caffeine withdrawal, use of pain meds, dehydration, high blood pressure, lack of sleep, over-activity/exhaustion, flare-up of usual migraine headaches.  If you feel this is related to muscle tension (a band-like feeling around the head, or a pressure at the low-back of the head) you may try ice or heat to this area.  You may need to drink more fluids (try electrolyte drink like Gatorade), rest, or take your usual migraine medications.   **If your headaches do not resolve, worsen, are accompanied by other symptoms, or if your blood pressure is high, please call your primary physician for recommendation and/or examination.    Q:  I am unable to urinate.  What do I do?  A:  A small percentage of people can have difficulty urinating initially after surgery.  This includes being able to urinate only a very small amount at a time and feeling discomfort or pressure in the very low abdomen.  This is called  urinary retention , and is actually an urgent situation.  Proceed to your nearest Emergency department for evaluation (not an Urgent Care Center).  Sometimes the bladder does not work correctly after certain medications you receive during surgery, or related to certain procedures.  You may need to have a catheter placed until your bladder recovers.  When planning to go to an Emergency department, it may help to call the ER to let them know you are coming in for this problem after a surgery.  This may help you get in quicker to be evaluated.  **If you have symptoms of a urinary tract infection, please contact your primary physician for the proper evaluation and treatment.          If you have other questions, please call the office Monday thru Friday between 8am and 5pm to discuss with the nurse or physician assistant.  #[phone]    There is a surgeon ON  CALL on weekday evenings and over the weekend in case of urgent need only, and may be contacted at the same number.    If you are having an emergency, call 911 or proceed to your nearest emergency department.

## 2019-10-28 ENCOUNTER — HEALTH MAINTENANCE LETTER (OUTPATIENT)
Age: 64
End: 2019-10-28

## 2019-10-29 ENCOUNTER — DOCUMENTATION ONLY (OUTPATIENT)
Dept: OTHER | Facility: CLINIC | Age: 64
End: 2019-10-29

## 2019-10-30 ENCOUNTER — OFFICE VISIT (OUTPATIENT)
Dept: SURGERY | Facility: CLINIC | Age: 64
End: 2019-10-30
Payer: MEDICARE

## 2019-10-30 ENCOUNTER — TELEPHONE (OUTPATIENT)
Dept: SURGERY | Facility: CLINIC | Age: 64
End: 2019-10-30

## 2019-10-30 VITALS — RESPIRATION RATE: 16 BRPM | DIASTOLIC BLOOD PRESSURE: 74 MMHG | SYSTOLIC BLOOD PRESSURE: 114 MMHG | HEART RATE: 70 BPM

## 2019-10-30 DIAGNOSIS — Z09 SURGICAL FOLLOWUP VISIT: Primary | ICD-10-CM

## 2019-10-30 PROCEDURE — 99024 POSTOP FOLLOW-UP VISIT: CPT

## 2019-10-30 NOTE — TELEPHONE ENCOUNTER
S/p R total mastectomy with advancement flap closure. 10/24/19.  Surgeon:  Dr. Castellano    Patient reports that 24 hr output from JACOB drain has been under 30 ml since she went home from the hospital.  She has been stripping and emptying the drain twice a day.  For the last two days she states her output has been under 20ml  (less than 10ml each time she empties drain).  Output color is now a orange in color.  This morning she had only a scant amount out from the drain.      She is scheduled for a nurse appointment this morning at 10:30 for a drain pull. Patient will bring drain output record with her to appointment.

## 2019-10-30 NOTE — TELEPHONE ENCOUNTER
Name of caller: Patient    Reason for Call:  Would like JACOB drain pulled    Surgeon:  Dr. Castellano    Recent Surgery:  Yes.    If yes, when & what type:  10/24/19 Right total mastectomy with advancement flap closure      Best phone number to reach pt at is: [phone]  Ok to leave a message with medical info? Yes.    Pharmacy preferred (if calling for a refill): [facility]

## 2019-10-30 NOTE — PATIENT INSTRUCTIONS
Change dressing over drain site to keep clean and dry.  Return to clinic in one week for seroma check, can coordinate with post-op appointment with Dr. Castellano.

## 2019-10-31 NOTE — RESULT ENCOUNTER NOTE
Patient was notified of these results.  Margins are technically negative but very close.  Deep margin involved resection of fascia.  Cannot resect more tissue now without skin grafting.  Could consider further resection later after the skin has had a chance to heal and stretch and relax.  Discussed these options with the patient.  For now, she will continue to take care of her incision and allowed to heal.  She has a follow-up arranged in our office in 1 week.  Nino Castellano MD  10/31/2019 2:38 PM

## 2019-11-07 ENCOUNTER — OFFICE VISIT (OUTPATIENT)
Dept: SURGERY | Facility: CLINIC | Age: 64
End: 2019-11-07
Payer: MEDICARE

## 2019-11-07 VITALS
DIASTOLIC BLOOD PRESSURE: 74 MMHG | HEIGHT: 69 IN | OXYGEN SATURATION: 98 % | HEART RATE: 74 BPM | WEIGHT: 160 LBS | RESPIRATION RATE: 16 BRPM | BODY MASS INDEX: 23.7 KG/M2 | SYSTOLIC BLOOD PRESSURE: 102 MMHG

## 2019-11-07 DIAGNOSIS — C50.011 MALIGNANT NEOPLASM INVOLVING BOTH NIPPLE AND AREOLA OF RIGHT BREAST IN FEMALE, UNSPECIFIED ESTROGEN RECEPTOR STATUS (H): ICD-10-CM

## 2019-11-07 DIAGNOSIS — Z09 SURGICAL FOLLOWUP VISIT: Primary | ICD-10-CM

## 2019-11-07 PROCEDURE — 99024 POSTOP FOLLOW-UP VISIT: CPT | Performed by: SURGERY

## 2019-11-07 ASSESSMENT — MIFFLIN-ST. JEOR: SCORE: 1350.14

## 2019-11-07 NOTE — LETTER
2019    RE: Lorna Guzman, : 1955      Returns in follow-up after mastectomy for progressive ulcerated right breast cancer.  Margins show the anterior/inferior margin is less than 1 mm but technically negative.  At this point additional tissue cannot be resected from the site without either skin grafting or allowing the tissue to relax and re-excising at a later date.  I did discuss this with the patient and for now, she is content to have her ulcerated lesion controlled.  She does have follow-up with her oncologist in about 2 weeks.     Exam: Incision is healing nicely without any sign of complication or problem.     Impression: Satisfactory postop recovery, very close anterior/inferior margin     Plan: Follow-up with oncology, additional resection as/if necessary     Nino Castellano MD

## 2019-11-07 NOTE — PROGRESS NOTES
Returns in follow-up after mastectomy for progressive ulcerated right breast cancer.  Margins show the anterior/inferior margin is less than 1 mm but technically negative.  At this point additional tissue cannot be resected from the site without either skin grafting or allowing the tissue to relax and re-excising at a later date.  I did discuss this with the patient and for now, she is content to have her ulcerated lesion controlled.  She does have follow-up with her oncologist in about 2 weeks.    Exam: Incision is healing nicely without any sign of complication or problem.    Impression: Satisfactory postop recovery, very close anterior/inferior margin    Plan: Follow-up with oncology, additional resection as/if necessary    Nino Castellano MD  11/7/2019 1:29 PM    Please route or send letter to:  Primary Care Provider (PCP), Referring Provider and Include Progress Note

## 2019-11-25 ENCOUNTER — TRANSFERRED RECORDS (OUTPATIENT)
Dept: HEALTH INFORMATION MANAGEMENT | Facility: CLINIC | Age: 64
End: 2019-11-25

## 2019-12-23 ENCOUNTER — OFFICE VISIT (OUTPATIENT)
Dept: CARDIOLOGY | Facility: CLINIC | Age: 64
End: 2019-12-23
Attending: PHYSICIAN ASSISTANT
Payer: MEDICARE

## 2019-12-23 ENCOUNTER — HOSPITAL ENCOUNTER (OUTPATIENT)
Dept: CARDIOLOGY | Facility: CLINIC | Age: 64
Discharge: HOME | End: 2019-12-23
Attending: PHYSICIAN ASSISTANT | Admitting: PHYSICIAN ASSISTANT
Payer: MEDICARE

## 2019-12-23 VITALS
HEART RATE: 88 BPM | DIASTOLIC BLOOD PRESSURE: 70 MMHG | HEIGHT: 69 IN | WEIGHT: 159.8 LBS | BODY MASS INDEX: 23.67 KG/M2 | SYSTOLIC BLOOD PRESSURE: 120 MMHG

## 2019-12-23 DIAGNOSIS — I42.9 SECONDARY CARDIOMYOPATHY (H): ICD-10-CM

## 2019-12-23 DIAGNOSIS — I42.9 SECONDARY CARDIOMYOPATHY (H): Primary | ICD-10-CM

## 2019-12-23 PROCEDURE — 93321 DOPPLER ECHO F-UP/LMTD STD: CPT | Mod: 26 | Performed by: INTERNAL MEDICINE

## 2019-12-23 PROCEDURE — 99214 OFFICE O/P EST MOD 30 MIN: CPT | Mod: 25 | Performed by: PHYSICIAN ASSISTANT

## 2019-12-23 PROCEDURE — 93308 TTE F-UP OR LMTD: CPT | Mod: 26 | Performed by: INTERNAL MEDICINE

## 2019-12-23 PROCEDURE — 93325 DOPPLER ECHO COLOR FLOW MAPG: CPT | Mod: 26 | Performed by: INTERNAL MEDICINE

## 2019-12-23 PROCEDURE — 93308 TTE F-UP OR LMTD: CPT

## 2019-12-23 ASSESSMENT — MIFFLIN-ST. JEOR: SCORE: 1349.23

## 2019-12-23 NOTE — LETTER
12/23/2019    Zain Lua MD  [facility] [address]  [facility] [address]    RE: Lorna A Thomas       Dear Colleague,    I had the pleasure of seeing Lorna Guzman in the [facility].    Please see separate dictation for HPI, PHYSICAL EXAM AND IMPRESSION/PLAN.    CURRENT MEDICATIONS:  Current Outpatient Medications   Medication Sig Dispense Refill     carvedilol (COREG) 25 MG tablet Take 1 tablet (25 mg) by mouth 2 times daily (with meals) 180 tablet 3     fulvestrant (FASLODEX) 250 MG/5ML injection Inject into the muscle every 30 days       levocetirizine (XYZAL) 5 MG tablet Take 2.5 mg by mouth every evening       ribociclib (KISQALI 600 DOSE) 200 MG tablet Take 600 mg by mouth daily Three weeks on, one week off - repeat       trastuzumab (HERCEPTIN) 150 MG SOLR injection Inject into the vein every 21 days Chemo agent / HERCEPTIN       HYDROcodone-acetaminophen (NORCO) 5-325 MG tablet Take 1 tablet by mouth every 6 hours as needed for moderate to severe pain (Patient not taking: Reported on 12/23/2019) 20 tablet 0     Multiple Vitamins-Minerals (MULTIVITAMIN PO) Take by mouth daily         ALLERGIES:     Allergies   Allergen Reactions     Lisinopril Hives and Swelling     Venlafaxine Hives and Swelling     Possibly allergy       PAST MEDICAL HISTORY:  Past Medical History:   Diagnosis Date     Cancer (H)     breast     Cardiomyopathy (H)        PAST SURGICAL HISTORY:  Past Surgical History:   Procedure Laterality Date     BREAST SURGERY      March 2006     MASTECTOMY SIMPLE Right 10/24/2019    Procedure: RIGHT MASTECTOMY;  Surgeon: Nino Castellano MD;  Location:  OR       SOCIAL HISTORY:  Social History     Socioeconomic History     Marital status:      Spouse name: None     Number of children: None     Years of education: None     Highest education level: None   Occupational History     None   Social Needs     Financial resource  strain: None     Food insecurity:     Worry: None     Inability: None     Transportation needs:     Medical: None     Non-medical: None   Tobacco Use     Smoking status: Never Smoker     Smokeless tobacco: Never Used   Substance and Sexual Activity     Alcohol use: No     Alcohol/week: 0.0 standard drinks     Drug use: No     Sexual activity: Yes     Partners: Male   Lifestyle     Physical activity:     Days per week: None     Minutes per session: None     Stress: None   Relationships     Social connections:     Talks on phone: None     Gets together: None     Attends [employer] service: None     Active member of club or organization: None     Attends meetings of clubs or organizations: None     Relationship status: None     Intimate partner violence:     Fear of current or ex partner: None     Emotionally abused: None     Physically abused: None     Forced sexual activity: None   Other Topics Concern     Parent/sibling w/ CABG, MI or angioplasty before 65F 55M? Not Asked   Social History Narrative     None       FAMILY HISTORY:  Family History   Problem Relation Age of Onset     Diabetes Mother      Breast Cancer Mother      Ovarian Cancer Mother      Hypertension Father      Breast Cancer Sister        Review of Systems:  Skin:  Negative       Eyes:  Positive for contacts    ENT:  Negative      Respiratory:  Negative       Cardiovascular:  Negative      Gastroenterology: Negative      Genitourinary:  Negative      Musculoskeletal:  Negative      Neurologic:  Positive for numbness or tingling of hands;numbness or tingling of feet    Psychiatric:  Negative      Heme/Lymph/Imm:  Negative      Endocrine:  Negative         Reviewed. Remainder of the note dictated.    Melisa Billingsley PA-C      Service Date: 12/23/2019      HISTORY OF PRESENT ILLNESS:  Ms. Guzman is a very pleasant 64-year-old female who presents to the office today for followup after a recent echocardiogram.      Again, she is followed in our  clinic from a Cardio-Oncology perspective due to a history of a chemo-induced cardiomyopathy and metastatic breast carcinoma with ongoing Herceptin use.  She was initially diagnosed with breast cancer in 2006, at which time she underwent a lumpectomy, AC chemotherapy and adjuvant radiation.  She was started on tamoxifen and after 2 years was transitioned to Aromasin.  She completed this treatment in 07/2013.  Unfortunately, in late 2017 she was again diagnosed with an invasive ductal carcinoma of the right breast.  She was also found to have evidence of metastatic disease (in the liver and a lymph node) at that time.  As part of her workup, she underwent an echocardiogram which showed mildly reduced LV systolic function with an EF of approximately 47%.  It was at that time that she was referred to Cardiology.  She had a stress cardiac MRI which showed normal LV size with mild to moderately reduced function and an LVEF of 43%.  She was also noted to have mitral valve prolapse and mild mitral regurgitation.  Stress imaging did not show any evidence of ischemia.  She was started on carvedilol and lisinopril at that time.  Her cancer was treated with 4 cycles of TC chemotherapy, and she was started on Herceptin on an every 3 week basis, which is likely to continue lifelong.  Fortunately, from a cardiac standpoint, followup cardiac MRIs and echocardiography have shown slight improvement/stability of her LVEF with ongoing therapy.  In the late summer/early fall of 2018 she did develop severe facial swelling and it was felt that this was related to angioedema secondary to her lisinopril, which was discontinued.  At that time, we decided to continue with carvedilol alone unless we saw a decline in her LV systolic function.      From a cardiac standpoint, she has been doing well and denies any cardiovascular symptoms.  She has been following closely with Oncology, and unfortunately her right breast tumor was causing some  "cutaneous changes. She did have a right-sided mastectomy in October and is doing well.  For the time being, she will continue on Faslodex as well as Kisqali and Herceptin.  She may possibly need further chemotherapy in the future, but they are going to hold off for the time being.  She continues to follow very closely with Dr. Lua at [facility].      She had a followup echocardiogram today:  1. The left ventricle is normal in structure, function and size. The visual ejection fraction is estimated at 53%. Global peak LV longitudinal strain is averaged at -18%. This is within reported normal limits (normal <-18%).  2. The right ventricle is normal in structure, function and size.  3. No valve disease.     No changes from echo 10-10-19.      CURRENT CARDIAC MEDICATIONS:   1.  Carvedilol 25 mg b.i.d.      The remainder of her medications, allergies and review of systems were reviewed and as are documented separately.      PHYSICAL EXAMINATION:   GENERAL:  The patient is a pleasant 64-year-old female who appears her stated age.  She is in no apparent distress.   VITAL SIGNS:  /70 (BP Location: Right arm, Patient Position: Sitting, Cuff Size: Adult Regular)   Pulse 88   Ht 1.753 m (5' 9\")   Wt 72.5 kg (159 lb 12.8 oz)   LMP  (LMP Unknown)   Breastfeeding No   BMI 23.60 kg/m       PULMONARY:  Breathing is nonlabored.  Lungs are clear to auscultation.   CARDIAC:  Examination reveals a regular rate and rhythm, no murmur is appreciated.   NEUROLOGIC:  Alert and oriented.      ASSESSMENT AND PLAN:  Ms. Guzman is a very pleasant 64-year-old female with a history of recurrent/metastatic right breast cancer.  Again, she previously underwent treatment with Adriamycin in 2006 and it was felt that she likely developed a mild chemo-induced cardiomyopathy after this.  Fortunately, with medical therapy, her EF has essentially normalized and stabilized.  She continues to get Herceptin, and we will continue every " 3 month echos while she remains on this therapy.       I recommend a 3-month followup in the spring with Dr. Spencer with an echo prior.  Of course, I encouraged her to contact us sooner with any questions or concerns.      cc:   Zain Lua MD    [facility]    [address], [address]    [place]  [address]         ANGEL MCDONNELL PA-C              Thank you for allowing me to participate in the care of your patient.      Sincerely,     Angel Mcdonnell PA-C     [facility]    cc:   Angel Mcdonnell PA-C  [facility]  [address] [place] [address]

## 2019-12-23 NOTE — LETTER
12/23/2019    Zain Lua MD  [facility] [address]  [facility] [address]    RE: Lorna A Thomas       Dear Colleague,    I had the pleasure of seeing Lorna Guzman in the [facility].    Please see separate dictation for HPI, PHYSICAL EXAM AND IMPRESSION/PLAN.    CURRENT MEDICATIONS:  Current Outpatient Medications   Medication Sig Dispense Refill     carvedilol (COREG) 25 MG tablet Take 1 tablet (25 mg) by mouth 2 times daily (with meals) 180 tablet 3     fulvestrant (FASLODEX) 250 MG/5ML injection Inject into the muscle every 30 days       levocetirizine (XYZAL) 5 MG tablet Take 2.5 mg by mouth every evening       ribociclib (KISQALI 600 DOSE) 200 MG tablet Take 600 mg by mouth daily Three weeks on, one week off - repeat       trastuzumab (HERCEPTIN) 150 MG SOLR injection Inject into the vein every 21 days Chemo agent / HERCEPTIN       HYDROcodone-acetaminophen (NORCO) 5-325 MG tablet Take 1 tablet by mouth every 6 hours as needed for moderate to severe pain (Patient not taking: Reported on 12/23/2019) 20 tablet 0     Multiple Vitamins-Minerals (MULTIVITAMIN PO) Take by mouth daily         ALLERGIES:     Allergies   Allergen Reactions     Lisinopril Hives and Swelling     Venlafaxine Hives and Swelling     Possibly allergy       PAST MEDICAL HISTORY:  Past Medical History:   Diagnosis Date     Cancer (H)     breast     Cardiomyopathy (H)        PAST SURGICAL HISTORY:  Past Surgical History:   Procedure Laterality Date     BREAST SURGERY      March 2006     MASTECTOMY SIMPLE Right 10/24/2019    Procedure: RIGHT MASTECTOMY;  Surgeon: Nino Castellano MD;  Location:  OR       SOCIAL HISTORY:  Social History     Socioeconomic History     Marital status:      Spouse name: None     Number of children: None     Years of education: None     Highest education level: None   Occupational History     None   Social Needs     Financial resource  strain: None     Food insecurity:     Worry: None     Inability: None     Transportation needs:     Medical: None     Non-medical: None   Tobacco Use     Smoking status: Never Smoker     Smokeless tobacco: Never Used   Substance and Sexual Activity     Alcohol use: No     Alcohol/week: 0.0 standard drinks     Drug use: No     Sexual activity: Yes     Partners: Male   Lifestyle     Physical activity:     Days per week: None     Minutes per session: None     Stress: None   Relationships     Social connections:     Talks on phone: None     Gets together: None     Attends [employer] service: None     Active member of club or organization: None     Attends meetings of clubs or organizations: None     Relationship status: None     Intimate partner violence:     Fear of current or ex partner: None     Emotionally abused: None     Physically abused: None     Forced sexual activity: None   Other Topics Concern     Parent/sibling w/ CABG, MI or angioplasty before 65F 55M? Not Asked   Social History Narrative     None       FAMILY HISTORY:  Family History   Problem Relation Age of Onset     Diabetes Mother      Breast Cancer Mother      Ovarian Cancer Mother      Hypertension Father      Breast Cancer Sister        Review of Systems:  Skin:  Negative       Eyes:  Positive for contacts    ENT:  Negative      Respiratory:  Negative       Cardiovascular:  Negative      Gastroenterology: Negative      Genitourinary:  Negative      Musculoskeletal:  Negative      Neurologic:  Positive for numbness or tingling of hands;numbness or tingling of feet    Psychiatric:  Negative      Heme/Lymph/Imm:  Negative      Endocrine:  Negative         Reviewed. Remainder of the note dictated.    Melisa Billingsley PA-C      Service Date: 12/23/2019      HISTORY OF PRESENT ILLNESS:  Ms. Guzman is a very pleasant 64-year-old female who presents to the office today for followup after a recent echocardiogram.      Again, she is followed in our  clinic from a Cardio-Oncology perspective due to a history of a chemo-induced cardiomyopathy and metastatic breast carcinoma with ongoing Herceptin use.  She was initially diagnosed with breast cancer in 2006, at which time she underwent a lumpectomy, AC chemotherapy and adjuvant radiation.  She was started on tamoxifen and after 2 years was transitioned to Aromasin.  She completed this treatment in 07/2013.  Unfortunately, in late 2017 she was again diagnosed with an invasive ductal carcinoma of the right breast.  She was also found to have evidence of metastatic disease (in the liver and a lymph node) at that time.  As part of her workup, she underwent an echocardiogram which showed mildly reduced LV systolic function with an EF of approximately 47%.  It was at that time that she was referred to Cardiology.  She had a stress cardiac MRI which showed normal LV size with mild to moderately reduced function and an LVEF of 43%.  She was also noted to have mitral valve prolapse and mild mitral regurgitation.  Stress imaging did not show any evidence of ischemia.  She was started on carvedilol and lisinopril at that time.  Her cancer was treated with 4 cycles of TC chemotherapy, and she was started on Herceptin on an every 3 week basis, which is likely to continue lifelong.  Fortunately, from a cardiac standpoint, followup cardiac MRIs and echocardiography have shown slight improvement/stability of her LVEF with ongoing therapy.  In the late summer/early fall of 2018 she did develop severe facial swelling and it was felt that this was related to angioedema secondary to her lisinopril, which was discontinued.  At that time, we decided to continue with carvedilol alone unless we saw a decline in her LV systolic function.      From a cardiac standpoint, she has been doing well and denies any cardiovascular symptoms.  She has been following closely with Oncology, and unfortunately her right breast tumor was causing some  "cutaneous changes. She did have a right-sided mastectomy in October and is doing well.  For the time being, she will continue on Faslodex as well as Kisqali and Herceptin.  She may possibly need further chemotherapy in the future, but they are going to hold off for the time being.  She continues to follow very closely with Dr. Lua at [facility].      She had a followup echocardiogram today:  1. The left ventricle is normal in structure, function and size. The visual ejection fraction is estimated at 53%. Global peak LV longitudinal strain is averaged at -18%. This is within reported normal limits (normal <-18%).  2. The right ventricle is normal in structure, function and size.  3. No valve disease.     No changes from echo 10-10-19.      CURRENT CARDIAC MEDICATIONS:   1.  Carvedilol 25 mg b.i.d.      The remainder of her medications, allergies and review of systems were reviewed and as are documented separately.      PHYSICAL EXAMINATION:   GENERAL:  The patient is a pleasant 64-year-old female who appears her stated age.  She is in no apparent distress.   VITAL SIGNS:  /70 (BP Location: Right arm, Patient Position: Sitting, Cuff Size: Adult Regular)   Pulse 88   Ht 1.753 m (5' 9\")   Wt 72.5 kg (159 lb 12.8 oz)   LMP  (LMP Unknown)   Breastfeeding No   BMI 23.60 kg/m       PULMONARY:  Breathing is nonlabored.  Lungs are clear to auscultation.   CARDIAC:  Examination reveals a regular rate and rhythm, no murmur is appreciated.   NEUROLOGIC:  Alert and oriented.      ASSESSMENT AND PLAN:  Ms. Guzman is a very pleasant 64-year-old female with a history of recurrent/metastatic right breast cancer.  Again, she previously underwent treatment with Adriamycin in 2006 and it was felt that she likely developed a mild chemo-induced cardiomyopathy after this.  Fortunately, with medical therapy, her EF has essentially normalized and stabilized.  She continues to get Herceptin, and we will continue every " 3 month echos while she remains on this therapy.       I recommend a 3-month followup in the spring with Dr. Spencer with an echo prior.  Of course, I encouraged her to contact us sooner with any questions or concerns.      cc:   Zain Lua MD    [facility]    [address], [address]    [place]  [address]         Thank you for allowing me to participate in the care of your patient.    Sincerely,     Melisa Billingsley PA-C     [facility]

## 2019-12-23 NOTE — PROGRESS NOTES
Service Date: 12/23/2019      HISTORY OF PRESENT ILLNESS:  Ms. Guzman is a very pleasant 64-year-old female who presents to the office today for followup after a recent echocardiogram.      Again, she is followed in our clinic from a Cardio-Oncology perspective due to a history of a chemo-induced cardiomyopathy and metastatic breast carcinoma with ongoing Herceptin use.  She was initially diagnosed with breast cancer in 2006, at which time she underwent a lumpectomy, AC chemotherapy and adjuvant radiation.  She was started on tamoxifen and after 2 years was transitioned to Aromasin.  She completed this treatment in 07/2013.  Unfortunately, in late 2017 she was again diagnosed with an invasive ductal carcinoma of the right breast.  She was also found to have evidence of metastatic disease (in the liver and a lymph node) at that time.  As part of her workup, she underwent an echocardiogram which showed mildly reduced LV systolic function with an EF of approximately 47%.  It was at that time that she was referred to Cardiology.  She had a stress cardiac MRI which showed normal LV size with mild to moderately reduced function and an LVEF of 43%.  She was also noted to have mitral valve prolapse and mild mitral regurgitation.  Stress imaging did not show any evidence of ischemia.  She was started on carvedilol and lisinopril at that time.  Her cancer was treated with 4 cycles of TC chemotherapy, and she was started on Herceptin on an every 3 week basis, which is likely to continue lifelong.  Fortunately, from a cardiac standpoint, followup cardiac MRIs and echocardiography have shown slight improvement/stability of her LVEF with ongoing therapy.  In the late summer/early fall of 2018 she did develop severe facial swelling and it was felt that this was related to angioedema secondary to her lisinopril, which was discontinued.  At that time, we decided to continue with carvedilol alone unless we saw a decline in her LV  "systolic function.      From a cardiac standpoint, she has been doing well and denies any cardiovascular symptoms.  She has been following closely with Oncology, and unfortunately her right breast tumor was causing some cutaneous changes. She did have a right-sided mastectomy in October and is doing well.  For the time being, she will continue on Faslodex as well as Kisqali and Herceptin.  She may possibly need further chemotherapy in the future, but they are going to hold off for the time being.  She continues to follow very closely with Dr. Lua at [facility].      She had a followup echocardiogram today:  1. The left ventricle is normal in structure, function and size. The visual ejection fraction is estimated at 53%. Global peak LV longitudinal strain is averaged at -18%. This is within reported normal limits (normal <-18%).  2. The right ventricle is normal in structure, function and size.  3. No valve disease.     No changes from echo 10-10-19.      CURRENT CARDIAC MEDICATIONS:   1.  Carvedilol 25 mg b.i.d.      The remainder of her medications, allergies and review of systems were reviewed and as are documented separately.      PHYSICAL EXAMINATION:   GENERAL:  The patient is a pleasant 64-year-old female who appears her stated age.  She is in no apparent distress.   VITAL SIGNS:  /70 (BP Location: Right arm, Patient Position: Sitting, Cuff Size: Adult Regular)   Pulse 88   Ht 1.753 m (5' 9\")   Wt 72.5 kg (159 lb 12.8 oz)   LMP  (LMP Unknown)   Breastfeeding No   BMI 23.60 kg/m      PULMONARY:  Breathing is nonlabored.  Lungs are clear to auscultation.   CARDIAC:  Examination reveals a regular rate and rhythm, no murmur is appreciated.   NEUROLOGIC:  Alert and oriented.      ASSESSMENT AND PLAN:  Ms. Guzman is a very pleasant 64-year-old female with a history of recurrent/metastatic right breast cancer.  Again, she previously underwent treatment with Adriamycin in 2006 and it was felt " that she likely developed a mild chemo-induced cardiomyopathy after this.  Fortunately, with medical therapy, her EF has essentially normalized and stabilized.  She continues to get Herceptin, and we will continue every 3 month echos while she remains on this therapy.       I recommend a 3-month followup in the spring with Dr. Spencer with an echo prior.  Of course, I encouraged her to contact us sooner with any questions or concerns.      cc:   Zain Lua MD    [facility]    [address], [address]    [place]  [address]         ANGEL MCDONNELL PA-C

## 2019-12-23 NOTE — PATIENT INSTRUCTIONS
Thank you for your [facility] visit today. Your provider has recommended the following:  Medication Changes:  No changes  Recommendations:  Echo in 3 months  Follow-up:  See Dr Spencer for cardiology follow up in 3 months.    Reminder:  Please bring in your current medication list or your medication, over the counter supplements and vitamin bottles as we will review these at each office visit.

## 2019-12-23 NOTE — PROGRESS NOTES
Please see separate dictation for HPI, PHYSICAL EXAM AND IMPRESSION/PLAN.    CURRENT MEDICATIONS:  Current Outpatient Medications   Medication Sig Dispense Refill     carvedilol (COREG) 25 MG tablet Take 1 tablet (25 mg) by mouth 2 times daily (with meals) 180 tablet 3     fulvestrant (FASLODEX) 250 MG/5ML injection Inject into the muscle every 30 days       levocetirizine (XYZAL) 5 MG tablet Take 2.5 mg by mouth every evening       ribociclib (KISQALI 600 DOSE) 200 MG tablet Take 600 mg by mouth daily Three weeks on, one week off - repeat       trastuzumab (HERCEPTIN) 150 MG SOLR injection Inject into the vein every 21 days Chemo agent / HERCEPTIN       HYDROcodone-acetaminophen (NORCO) 5-325 MG tablet Take 1 tablet by mouth every 6 hours as needed for moderate to severe pain (Patient not taking: Reported on 12/23/2019) 20 tablet 0     Multiple Vitamins-Minerals (MULTIVITAMIN PO) Take by mouth daily         ALLERGIES:     Allergies   Allergen Reactions     Lisinopril Hives and Swelling     Venlafaxine Hives and Swelling     Possibly allergy       PAST MEDICAL HISTORY:  Past Medical History:   Diagnosis Date     Cancer (H)     breast     Cardiomyopathy (H)        PAST SURGICAL HISTORY:  Past Surgical History:   Procedure Laterality Date     BREAST SURGERY      March 2006     MASTECTOMY SIMPLE Right 10/24/2019    Procedure: RIGHT MASTECTOMY;  Surgeon: Nino Castellano MD;  Location:  OR       SOCIAL HISTORY:  Social History     Socioeconomic History     Marital status:      Spouse name: None     Number of children: None     Years of education: None     Highest education level: None   Occupational History     None   Social Needs     Financial resource strain: None     Food insecurity:     Worry: None     Inability: None     Transportation needs:     Medical: None     Non-medical: None   Tobacco Use     Smoking status: Never Smoker     Smokeless tobacco: Never Used   Substance and Sexual Activity     Alcohol  use: No     Alcohol/week: 0.0 standard drinks     Drug use: No     Sexual activity: Yes     Partners: Male   Lifestyle     Physical activity:     Days per week: None     Minutes per session: None     Stress: None   Relationships     Social connections:     Talks on phone: None     Gets together: None     Attends Mandaen service: None     Active member of club or organization: None     Attends meetings of clubs or organizations: None     Relationship status: None     Intimate partner violence:     Fear of current or ex partner: None     Emotionally abused: None     Physically abused: None     Forced sexual activity: None   Other Topics Concern     Parent/sibling w/ CABG, MI or angioplasty before 65F 55M? Not Asked   Social History Narrative     None       FAMILY HISTORY:  Family History   Problem Relation Age of Onset     Diabetes Mother      Breast Cancer Mother      Ovarian Cancer Mother      Hypertension Father      Breast Cancer Sister        Review of Systems:  Skin:  Negative       Eyes:  Positive for contacts    ENT:  Negative      Respiratory:  Negative       Cardiovascular:  Negative      Gastroenterology: Negative      Genitourinary:  Negative      Musculoskeletal:  Negative      Neurologic:  Positive for numbness or tingling of hands;numbness or tingling of feet    Psychiatric:  Negative      Heme/Lymph/Imm:  Negative      Endocrine:  Negative         Reviewed. Remainder of the note dictated.    Melisa Billingsley PA-C

## 2020-03-05 ENCOUNTER — TRANSFERRED RECORDS (OUTPATIENT)
Dept: HEALTH INFORMATION MANAGEMENT | Facility: CLINIC | Age: 65
End: 2020-03-05

## 2020-03-09 ENCOUNTER — DOCUMENTATION ONLY (OUTPATIENT)
Dept: CARDIOLOGY | Facility: CLINIC | Age: 65
End: 2020-03-09

## 2020-03-09 NOTE — PROGRESS NOTES
[facility] note 3-5-2020 f/up for breast cancer metastatic. CBC and CMP done  Notes sent to scan.

## 2020-03-15 ENCOUNTER — HEALTH MAINTENANCE LETTER (OUTPATIENT)
Age: 65
End: 2020-03-15

## 2020-03-19 PROBLEM — I34.1 MVP (MITRAL VALVE PROLAPSE): Status: ACTIVE | Noted: 2020-03-19

## 2020-03-19 PROBLEM — I34.0 MITRAL REGURGITATION: Status: ACTIVE | Noted: 2020-03-19

## 2020-03-31 ENCOUNTER — PRE VISIT (OUTPATIENT)
Dept: CARDIOLOGY | Facility: CLINIC | Age: 65
End: 2020-03-31

## 2020-04-03 ENCOUNTER — HOSPITAL ENCOUNTER (OUTPATIENT)
Dept: CARDIOLOGY | Facility: CLINIC | Age: 65
Discharge: HOME | End: 2020-04-03
Attending: PHYSICIAN ASSISTANT | Admitting: PHYSICIAN ASSISTANT
Payer: MEDICARE

## 2020-04-03 DIAGNOSIS — I42.9 SECONDARY CARDIOMYOPATHY (H): ICD-10-CM

## 2020-04-03 PROCEDURE — 93321 DOPPLER ECHO F-UP/LMTD STD: CPT | Mod: 26 | Performed by: INTERNAL MEDICINE

## 2020-04-03 PROCEDURE — 93308 TTE F-UP OR LMTD: CPT | Mod: 26 | Performed by: INTERNAL MEDICINE

## 2020-04-03 PROCEDURE — 93325 DOPPLER ECHO COLOR FLOW MAPG: CPT | Mod: 26 | Performed by: INTERNAL MEDICINE

## 2020-04-03 PROCEDURE — 93325 DOPPLER ECHO COLOR FLOW MAPG: CPT

## 2020-04-07 ENCOUNTER — VIRTUAL VISIT (OUTPATIENT)
Dept: CARDIOLOGY | Facility: CLINIC | Age: 65
End: 2020-04-07
Payer: MEDICARE

## 2020-04-07 VITALS
DIASTOLIC BLOOD PRESSURE: 62 MMHG | BODY MASS INDEX: 22.03 KG/M2 | WEIGHT: 149.2 LBS | HEART RATE: 64 BPM | SYSTOLIC BLOOD PRESSURE: 127 MMHG

## 2020-04-07 DIAGNOSIS — I42.9 SECONDARY CARDIOMYOPATHY (H): ICD-10-CM

## 2020-04-07 PROCEDURE — 99213 OFFICE O/P EST LOW 20 MIN: CPT | Mod: GT | Performed by: INTERNAL MEDICINE

## 2020-04-07 RX ORDER — CARVEDILOL 25 MG/1
25 TABLET ORAL 2 TIMES DAILY WITH MEALS
Qty: 180 TABLET | Refills: 3 | Status: ON HOLD | OUTPATIENT
Start: 2020-04-07 | End: 2021-02-10

## 2020-04-07 NOTE — PROGRESS NOTES
"Lorna Guzman is a 64 year old female who is being evaluated via a billable video visit.      The patient has been notified of following:     \"This video visit will be conducted via a call between you and your physician/provider. We have found that certain health care needs can be provided without the need for an in-person physical exam.  This service lets us provide the care you need with a video conversation.  If a prescription is necessary we can send it directly to your pharmacy.  If lab work is needed we can place an order for that and you can then stop by our lab to have the test done at a later time.    Video visits are billed at different rates depending on your insurance coverage.  Please reach out to your insurance provider with any questions.    If during the course of the call the physician/provider feels a video visit is not appropriate, you will not be charged for this service.\"    Patient has given verbal consent for Video visit?  YES    Patient would like the video invitation sent by: [phone]    4/07/20  - Write spoke w/pt via phone.  She is expecting a video visit via phone    YAAKOV Crouch    Video Start Time: 3:15 PM      I have reviewed and updated the patient's Past Medical History, Social History, Family History and Medication List.    ALLERGIES  Lisinopril and Venlafaxine        Video-Visit Details    Type of service:  Video Visit    Video End Time (time video stopped):3:25    Originating Location (pt. Location): Home    Distant Location (provider location):  [facility]     Mode of Communication:  Video Conference via [facility]      Elias Spencer MD      I had the pleasure of doing a tele[phone follow up with Ms. Guzman today.  She is a very pleasant 63-year-old female who I last saw in 11/2017.  She has a history of recurrent carcinoma of the right breast with mildly reduced left ventricular systolic function in   the absence of symptoms.  " She underwent stress perfusion imaging which was negative for ischemia and we have been following her with serial cardiac MRIs that have been stable despite regular Herceptin therapy.  She has, as stated above, always been asymptomatic from a cardiovascular standpoint.        Since I last saw her she underwent right-sided mastectomy for increasing size of her right-sided malignancy.  She also received radiation therapy which she is completed.  From a medical therapy standpoint, she remains on Herceptin and was also started on ribociclib and faslodex.  She is undergone periodic EKG monitoring for the QT interval, most recently in June 2019 at which point it was within normal limits.    Today she presents continuing to feel well.  She specifically denies any chest pain, dyspnea on exertion, PND, orthopnea or lower extremity edema.  She denies any syncope or presyncope.  She has been gardening regularly without any limitations and also works out at the gym.      An echocardiogram on 4/3/2020 demonstrated low normal left ventricular systolic function with normal peak left ventricular longitudinal strain.  Mild to moderate mitral regurgitation was noted.  There were no changes compared to the prior study.    Impression:     1.  Recurrent breast carcinoma as described above.  2.  Mild left ventricular dysfunction which has been asymptomatic and stable on serial monitoring.  Low normal left ventricular systolic function on her most recent echocardiogram.  This is most likely related to her prior chemotherapy.     The patient is stable overall from a cardiovascular standpoint.  There is no evidence of angina or congestive heart failure.  Her echocardiographic findings are reassuring.  Assuming her clinical status remains stable, I will follow-up in approximately 6 months.

## 2020-05-07 ENCOUNTER — TRANSFERRED RECORDS (OUTPATIENT)
Dept: HEALTH INFORMATION MANAGEMENT | Facility: CLINIC | Age: 65
End: 2020-05-07

## 2020-06-02 ENCOUNTER — TELEPHONE (OUTPATIENT)
Dept: CARDIOLOGY | Facility: CLINIC | Age: 65
End: 2020-06-02

## 2020-06-02 DIAGNOSIS — I42.9 CARDIOMYOPATHY, IDIOPATHIC (H): Primary | ICD-10-CM

## 2020-06-02 NOTE — TELEPHONE ENCOUNTER
Order placed for echo, EKG and [facility] visit in July. Called patient with update and she will contact [facility] to set these up.

## 2020-06-02 NOTE — TELEPHONE ENCOUNTER
Call from patient, she is still using herceptin therapy for her breast cancer and needs a 3-month echo and annual EKG ordered. She usually sees BALDOMERO Melisa Billingsley in [place] for follow up. Patient is requesting orders for those 3 appointments that will be due in July.    Will message Dr. Spencer to review

## 2020-07-02 ENCOUNTER — HOSPITAL ENCOUNTER (OUTPATIENT)
Dept: CARDIOLOGY | Facility: CLINIC | Age: 65
End: 2020-07-02
Attending: INTERNAL MEDICINE
Payer: MEDICARE

## 2020-07-02 ENCOUNTER — HOSPITAL ENCOUNTER (OUTPATIENT)
Dept: CARDIOLOGY | Facility: CLINIC | Age: 65
Discharge: HOME | End: 2020-07-02
Attending: INTERNAL MEDICINE | Admitting: INTERNAL MEDICINE
Payer: MEDICARE

## 2020-07-02 VITALS
SYSTOLIC BLOOD PRESSURE: 130 MMHG | HEIGHT: 69 IN | BODY MASS INDEX: 22.66 KG/M2 | HEART RATE: 79 BPM | DIASTOLIC BLOOD PRESSURE: 80 MMHG | WEIGHT: 153 LBS

## 2020-07-02 DIAGNOSIS — I42.9 CARDIOMYOPATHY, IDIOPATHIC (H): ICD-10-CM

## 2020-07-02 DIAGNOSIS — I42.9 SECONDARY CARDIOMYOPATHY (H): Primary | ICD-10-CM

## 2020-07-02 PROCEDURE — 99214 OFFICE O/P EST MOD 30 MIN: CPT | Mod: 25 | Performed by: PHYSICIAN ASSISTANT

## 2020-07-02 PROCEDURE — 93321 DOPPLER ECHO F-UP/LMTD STD: CPT | Mod: 26 | Performed by: INTERNAL MEDICINE

## 2020-07-02 PROCEDURE — 93308 TTE F-UP OR LMTD: CPT | Mod: 26 | Performed by: INTERNAL MEDICINE

## 2020-07-02 PROCEDURE — 93325 DOPPLER ECHO COLOR FLOW MAPG: CPT | Mod: 26 | Performed by: INTERNAL MEDICINE

## 2020-07-02 PROCEDURE — 93325 DOPPLER ECHO COLOR FLOW MAPG: CPT

## 2020-07-02 PROCEDURE — 93000 ELECTROCARDIOGRAM COMPLETE: CPT | Performed by: PHYSICIAN ASSISTANT

## 2020-07-02 ASSESSMENT — MIFFLIN-ST. JEOR: SCORE: 1313.38

## 2020-07-02 NOTE — LETTER
7/2/2020    Zain Lua MD  [facility] [address]  [facility] [address]    RE: Lorna Guzman       Dear Colleague,    I had the pleasure of seeing Lorna Guzman in the [facility].    Service Date: 7/2/2020     Lorna Guzman is a 65 year old female who is being evaluated via a billable video visit.       HISTORY OF PRESENTING COMPLAINT:  oLrna Guzman is a pleasant 65 year old female who is being evaluated via a billable video visit in order to minimize the possibility of exposure due to the current COVID-19 pandemic. This is a 3 month follow up.     Again, she is followed in our clinic from a Cardio-Oncology perspective due to a history of a chemo-induced cardiomyopathy and metastatic breast carcinoma with ongoing Herceptin use.  She was initially diagnosed with breast cancer in 2006, at which time she underwent a lumpectomy, AC chemotherapy and adjuvant radiation.  She was started on tamoxifen and after 2 years was transitioned to Aromasin.  She completed this treatment in 07/2013.  Unfortunately, in late 2017 she was again diagnosed with an invasive ductal carcinoma of the right breast.  She was also found to have evidence of metastatic disease (in the liver and a lymph node) at that time.  As part of her workup, she underwent an echocardiogram which showed mildly reduced LV systolic function with an EF of approximately 47%.  It was at that time that she was referred to Cardiology.  She had a stress cardiac MRI which showed normal LV size with mild to moderately reduced function and an LVEF of 43%.  She was also noted to have mitral valve prolapse and mild mitral regurgitation.  Stress imaging did not show any evidence of ischemia.  She was started on carvedilol and lisinopril at that time.  Her cancer was treated with 4 cycles of TC chemotherapy, and she was started on Herceptin on an every 3 week basis, which is likely to  continue lifelong.  Fortunately, from a cardiac standpoint, followup cardiac MRIs and echocardiography have shown slight improvement/stability of her LVEF with ongoing therapy.  In the late summer/early fall of 2018 she did develop severe facial swelling and it was felt that this was related to angioedema secondary to her lisinopril, which was discontinued.  At that time, we decided to continue with carvedilol alone unless we saw a decline in her LV systolic function.      From a cardiac standpoint, she has been doing well and denies any cardiovascular symptoms. She did complete radiation for her cancer earlier this year, her next scan will be later this month and she will follow up with Dr Stoney vasquez.       CURRENT CARDIAC MEDICATIONS:   Current Outpatient Medications   Medication Sig Dispense Refill     carvedilol (COREG) 25 MG tablet Take 1 tablet (25 mg) by mouth 2 times daily (with meals) 180 tablet 3     fulvestrant (FASLODEX) 250 MG/5ML injection Inject into the muscle every 30 days       ribociclib (KISQALI 600 DOSE) 200 MG tablet Take 600 mg by mouth daily Three weeks on, one week off - repeat       trastuzumab (HERCEPTIN) 150 MG SOLR injection Inject into the vein every 21 days Chemo agent / HERCEPTIN       HYDROcodone-acetaminophen (NORCO) 5-325 MG tablet Take 1 tablet by mouth every 6 hours as needed for moderate to severe pain (Patient not taking: Reported on 12/23/2019) 20 tablet 0     levocetirizine (XYZAL) 5 MG tablet Take 2.5 mg by mouth every evening       Multiple Vitamins-Minerals (MULTIVITAMIN PO) Take by mouth daily       The remainder of the medications, allergies and review of systems were reviewed and as are documented.      PHYSICAL EXAMINATION:   GENERAL: Healthy, alert and no distress  EYES: Eyes grossly normal to inspection.  No discharge or erythema, or obvious scleral/conjunctival abnormalities.  RESP: No audible wheeze, cough, or visible cyanosis.  No visible retractions or increased  work of breathing.    SKIN: Visible skin clear. No significant rash, abnormal pigmentation or lesions.  PSYCH: Mentation appears normal, affect normal/bright, judgement and insight intact, normal speech and appearance well-groomed.     Echo today:  The left ventricle is normal in structure, function and size.  Left ventricular systolic function is low normal.  The visual ejection fraction is estimated at 50-55%.  The right ventricle is normal in structure, function and size.  There is mild (1+) mitral regurgitation.     The calculated LVEF by modified Daniel's technique is 56% and the GLS=-19.4%.  Both values are within normal limits and unchanged since the last study  4/3/2020.    EKG: SR, no significant change, QTc 389ms    ASSESSMENT AND PLAN:  Lorna Guzman is a pleasant 65 year old female with a history of recurrent/metastatic right breast cancer.  Again, she previously underwent treatment with Adriamycin in 2006 and it was felt that she likely developed a mild chemo-induced cardiomyopathy after this.  Fortunately, with medical therapy, her EF has essentially normalized and stabilized.  She continues to take Herceptin, and we will continue every 3 month echos while she remains on this therapy.       I recommend a 3-month followup with an echo prior.  Of course, I encouraged her to contact us sooner with any questions or concerns.     An AVS will be mailed to the patient.      Thank you for allowing me to participate in the care of this pleasant patient.  Please do not hesitate to contact us with further questions or concerns.     Sincerely,     Melisa Billingsley PA-C     [facility]

## 2020-07-02 NOTE — PROGRESS NOTES
"Service Date: 7/2/2020     Lorna Guzman is a 65 year old female who is being evaluated via a billable video visit.      The patient has been notified of following:     \"This video visit will be conducted via a call between you and your physician/provider. We have found that certain health care needs can be provided without the need for an in-person physical exam.  This service lets us provide the care you need with a video conversation.  If a prescription is necessary we can send it directly to your pharmacy.  If lab work is needed we can place an order for that and you can then stop by our lab to have the test done at a later time.    Video visits are billed at different rates depending on your insurance coverage.  Please reach out to your insurance provider with any questions.    If during the course of the call the physician/provider feels a video visit is not appropriate, you will not be charged for this service.\"    Patient has given verbal consent for Video visit? Yes  How would you like to obtain your AVS? Mail a copy  Patient would like the video invitation sent by: Text to cell phone: [phone]  Will anyone else be joining your video visit? No     Patient reported vitals:  BP:130/80  Heart rate:79  Weight:153    Review Of Systems  Skin: negative  Eyes: negative  Ears/Nose/Throat: negative  Respiratory: No shortness of breath, dyspnea on exertion, cough, or hemoptysis  Cardiovascular: negative  Gastrointestinal: negative  Genitourinary: negative  Musculoskeletal: negative  Neurologic: numbness or tingling of feet  Psychiatric: negative  Hematologic/Lymphatic/Immunologic: negative  Endocrine: negative    SHowley CMA      Video-Visit Details    Type of service:  Video Visit    Video Start Time: 3:49 PM  Video End Time: 3:59 PM    Originating Location (pt. Location): Home    Distant Location (provider location):  [facility]     Platform used for Video Visit: " Doximity       HISTORY OF PRESENTING COMPLAINT:  Lorna Guzman is a pleasant 65 year old female who is being evaluated via a billable video visit in order to minimize the possibility of exposure due to the current COVID-19 pandemic. This is a 3 month follow up.     Again, she is followed in our clinic from a Cardio-Oncology perspective due to a history of a chemo-induced cardiomyopathy and metastatic breast carcinoma with ongoing Herceptin use.  She was initially diagnosed with breast cancer in 2006, at which time she underwent a lumpectomy, AC chemotherapy and adjuvant radiation.  She was started on tamoxifen and after 2 years was transitioned to Aromasin.  She completed this treatment in 07/2013.  Unfortunately, in late 2017 she was again diagnosed with an invasive ductal carcinoma of the right breast.  She was also found to have evidence of metastatic disease (in the liver and a lymph node) at that time.  As part of her workup, she underwent an echocardiogram which showed mildly reduced LV systolic function with an EF of approximately 47%.  It was at that time that she was referred to Cardiology.  She had a stress cardiac MRI which showed normal LV size with mild to moderately reduced function and an LVEF of 43%.  She was also noted to have mitral valve prolapse and mild mitral regurgitation.  Stress imaging did not show any evidence of ischemia.  She was started on carvedilol and lisinopril at that time.  Her cancer was treated with 4 cycles of TC chemotherapy, and she was started on Herceptin on an every 3 week basis, which is likely to continue lifelong.  Fortunately, from a cardiac standpoint, followup cardiac MRIs and echocardiography have shown slight improvement/stability of her LVEF with ongoing therapy.  In the late summer/early fall of 2018 she did develop severe facial swelling and it was felt that this was related to angioedema secondary to her lisinopril, which was discontinued.  At that time,  we decided to continue with carvedilol alone unless we saw a decline in her LV systolic function.      From a cardiac standpoint, she has been doing well and denies any cardiovascular symptoms. She did complete radiation for her cancer earlier this year, her next scan will be later this month and she will follow up with Dr Stoney vasquez.       CURRENT CARDIAC MEDICATIONS:   Current Outpatient Medications   Medication Sig Dispense Refill     carvedilol (COREG) 25 MG tablet Take 1 tablet (25 mg) by mouth 2 times daily (with meals) 180 tablet 3     fulvestrant (FASLODEX) 250 MG/5ML injection Inject into the muscle every 30 days       ribociclib (KISQALI 600 DOSE) 200 MG tablet Take 600 mg by mouth daily Three weeks on, one week off - repeat       trastuzumab (HERCEPTIN) 150 MG SOLR injection Inject into the vein every 21 days Chemo agent / HERCEPTIN       HYDROcodone-acetaminophen (NORCO) 5-325 MG tablet Take 1 tablet by mouth every 6 hours as needed for moderate to severe pain (Patient not taking: Reported on 12/23/2019) 20 tablet 0     levocetirizine (XYZAL) 5 MG tablet Take 2.5 mg by mouth every evening       Multiple Vitamins-Minerals (MULTIVITAMIN PO) Take by mouth daily       The remainder of the medications, allergies and review of systems were reviewed and as are documented.      PHYSICAL EXAMINATION:   GENERAL: Healthy, alert and no distress  EYES: Eyes grossly normal to inspection.  No discharge or erythema, or obvious scleral/conjunctival abnormalities.  RESP: No audible wheeze, cough, or visible cyanosis.  No visible retractions or increased work of breathing.    SKIN: Visible skin clear. No significant rash, abnormal pigmentation or lesions.  PSYCH: Mentation appears normal, affect normal/bright, judgement and insight intact, normal speech and appearance well-groomed.     Echo today:  The left ventricle is normal in structure, function and size.  Left ventricular systolic function is low normal.  The visual  ejection fraction is estimated at 50-55%.  The right ventricle is normal in structure, function and size.  There is mild (1+) mitral regurgitation.     The calculated LVEF by modified Daniel's technique is 56% and the GLS=-19.4%.  Both values are within normal limits and unchanged since the last study  4/3/2020.    EKG: SR, no significant change, QTc 389ms    ASSESSMENT AND PLAN:  Lorna Guzman is a pleasant 65 year old female with a history of recurrent/metastatic right breast cancer.  Again, she previously underwent treatment with Adriamycin in 2006 and it was felt that she likely developed a mild chemo-induced cardiomyopathy after this.  Fortunately, with medical therapy, her EF has essentially normalized and stabilized.  She continues to take Herceptin, and we will continue every 3 month echos while she remains on this therapy.       I recommend a 3-month followup with an echo prior.  Of course, I encouraged her to contact us sooner with any questions or concerns.     An AVS will be mailed to the patient.      Thank you for allowing me to participate in the care of this pleasant patient.  Please do not hesitate to contact us with further questions or concerns.         ANGEL MCDONNELL PA-C

## 2020-07-21 ENCOUNTER — TRANSFERRED RECORDS (OUTPATIENT)
Dept: HEALTH INFORMATION MANAGEMENT | Facility: CLINIC | Age: 65
End: 2020-07-21

## 2020-07-24 ENCOUNTER — DOCUMENTATION ONLY (OUTPATIENT)
Dept: CARDIOLOGY | Facility: CLINIC | Age: 65
End: 2020-07-24

## 2020-08-03 LAB
COPATH REPORT: NORMAL
COPATH REPORT: NORMAL

## 2020-10-02 ENCOUNTER — HOSPITAL ENCOUNTER (OUTPATIENT)
Dept: CARDIOLOGY | Facility: CLINIC | Age: 65
Discharge: HOME | End: 2020-10-02
Attending: PHYSICIAN ASSISTANT | Admitting: PHYSICIAN ASSISTANT
Payer: MEDICARE

## 2020-10-02 DIAGNOSIS — I42.9 SECONDARY CARDIOMYOPATHY (H): ICD-10-CM

## 2020-10-02 PROCEDURE — 93308 TTE F-UP OR LMTD: CPT | Mod: 26 | Performed by: INTERNAL MEDICINE

## 2020-10-02 PROCEDURE — 93325 DOPPLER ECHO COLOR FLOW MAPG: CPT | Mod: 26 | Performed by: INTERNAL MEDICINE

## 2020-10-02 PROCEDURE — 93321 DOPPLER ECHO F-UP/LMTD STD: CPT | Mod: 26 | Performed by: INTERNAL MEDICINE

## 2020-10-02 PROCEDURE — 93321 DOPPLER ECHO F-UP/LMTD STD: CPT

## 2020-10-06 ENCOUNTER — VIRTUAL VISIT (OUTPATIENT)
Dept: CARDIOLOGY | Facility: CLINIC | Age: 65
End: 2020-10-06
Attending: PHYSICIAN ASSISTANT
Payer: MEDICARE

## 2020-10-06 DIAGNOSIS — C50.911 MALIGNANT NEOPLASM OF RIGHT FEMALE BREAST, UNSPECIFIED ESTROGEN RECEPTOR STATUS, UNSPECIFIED SITE OF BREAST (H): ICD-10-CM

## 2020-10-06 DIAGNOSIS — I42.9 SECONDARY CARDIOMYOPATHY (H): Primary | ICD-10-CM

## 2020-10-06 PROCEDURE — 99213 OFFICE O/P EST LOW 20 MIN: CPT | Mod: 95 | Performed by: PHYSICIAN ASSISTANT

## 2020-10-06 NOTE — LETTER
"10/6/2020    Zain Lua MD  [facility] [address]  [facility] [address]    RE: Lorna Guzman       Dear Colleague,    I had the pleasure of seeing Lorna Guzman in the [facility].    Service Date: 10/6/2020     Lorna Guzman is a 65 year old female who is being evaluated via a billable video visit.      The patient has been notified of following:     \"This video visit will be conducted via a call between you and your physician/provider. We have found that certain health care needs can be provided without the need for an in-person physical exam.  This service lets us provide the care you need with a video conversation.  If a prescription is necessary we can send it directly to your pharmacy.  If lab work is needed we can place an order for that and you can then stop by our lab to have the test done at a later time.    Video visits are billed at different rates depending on your insurance coverage.  Please reach out to your insurance provider with any questions.    If during the course of the call the physician/provider feels a video visit is not appropriate, you will not be charged for this service.\"    Patient has given verbal consent for Video visit? Yes  How would you like to obtain your AVS? Mail a copy  If you are dropped from the video visit, the video invite should be resent to: Text to cell phone: [phone]  Will anyone else be joining your video visit? No       Review Of Systems  Skin: negative  Eyes: negative  Ears/Nose/Throat: negative  Respiratory: Dyspnea on exertion- Climbing caused some sob  Cardiovascular: negative  Gastrointestinal: negative  Genitourinary: negative  Musculoskeletal: negative  Neurologic: numbness or tingling of hands and numbness or tingling of feet  Psychiatric: negative  Hematologic/Lymphatic/Immunologic: negative  Endocrine: negative    Patient reported vitals:  BP: 113/65  Heart rate: 69  Weight: " 153lb    [facility]        Video-Visit Details    Type of service:  Video Visit    Video Start Time: 10:03 AM  Video End Time: 10:14 AM    Originating Location (pt. Location): Home    Distant Location (provider location):  [facility]     Platform used for Video Visit: [facility]       HISTORY OF PRESENTING COMPLAINT:  Lorna Guzman is a pleasant 65 year old female who is being evaluated via a billable video visit in order to minimize the possibility of exposure due to the current COVID-19 pandemic. This is a 3 month follow up.     Lorna is followed in our clinic from a Cardio-Oncology perspective due to a history of a chemo-induced cardiomyopathy and metastatic breast carcinoma with ongoing Herceptin use.  She was initially diagnosed with breast cancer in 2006, at which time she underwent a lumpectomy, AC chemotherapy and adjuvant radiation.  She was started on tamoxifen and after 2 years was transitioned to Aromasin.  She completed this treatment in 07/2013.  Unfortunately, in late 2017 she was again diagnosed with an invasive ductal carcinoma of the right breast.  She was also found to have evidence of metastatic disease (in the liver and a lymph node) at that time.  As part of her workup, she underwent an echocardiogram which showed mildly reduced LV systolic function with an EF of approximately 47%.  It was at that time that she was referred to Cardiology.  She had a stress cardiac MRI which showed normal LV size with mild to moderately reduced function and an LVEF of 43%.  She was also noted to have mitral valve prolapse and mild mitral regurgitation.  Stress imaging did not show any evidence of ischemia.  She was started on carvedilol and lisinopril at that time.  Her cancer was treated with 4 cycles of TC chemotherapy, and she was started on Herceptin on an every 3 week basis, which is likely to continue lifelong.  Fortunately, from a cardiac standpoint,  followup cardiac MRIs and echocardiography have shown slight improvement/stability of her LVEF with ongoing therapy.  In the late summer/early fall of 2018 she did develop severe facial swelling and it was felt that this was related to angioedema secondary to her lisinopril, which was discontinued.  At that time, we decided to continue with carvedilol alone unless we saw a decline in her LV systolic function. Spring 2019 she was noted to have a growing R breast mass, she was started on fulverstrant and ribociclib. She had a R mastectomy 10/2019 and had radiation in March 2020.      From a cardiac standpoint, she has been doing well and denies any cardiovascular symptoms.  She did note some GRAHAM when hiking [place] hill last week on the [place], but no other significant GRAHAM noted. Her next scan will be later this month and she will follow up with Dr Stoney vasquez.        CURRENT CARDIAC MEDICATIONS:   Current Outpatient Medications   Medication Sig Dispense Refill     carvedilol (COREG) 25 MG tablet Take 1 tablet (25 mg) by mouth 2 times daily (with meals) 180 tablet 3     fulvestrant (FASLODEX) 250 MG/5ML injection Inject into the muscle every 30 days       ribociclib (KISQALI 600 DOSE) 200 MG tablet Take 600 mg by mouth daily Three weeks on, one week off - repeat       trastuzumab (HERCEPTIN) 150 MG SOLR injection Inject into the vein every 21 days Chemo agent / HERCEPTIN       The remainder of the medications, allergies and review of systems were reviewed and as are documented.      PHYSICAL EXAMINATION:   GENERAL: Healthy, alert and no distress  EYES: Eyes grossly normal to inspection.  No discharge or erythema, or obvious scleral/conjunctival abnormalities.  RESP: No audible wheeze, cough, or visible cyanosis.  No visible retractions or increased work of breathing.    SKIN: Visible skin clear. No significant rash, abnormal pigmentation or lesions.  NEURO: Cranial nerves grossly intact.  Mentation and speech  appropriate for age.  PSYCH: Mentation appears normal, affect normal/bright, judgement and insight intact, normal speech and appearance well-groomed.     TTE 10/2/2020:  Left ventricular systolic function is low normal.  LVEF 52% based on biplane 2D tracing.  Global peak LV longitudinal strain is averaged at -18%. This is within  reported normal limits (normal <-18%).  There is mild to moderate (1-2+) mitral regurgitation.  Compared to prior study from 7/20, there is no significant change. The study  was technically adequate.    ASSESSMENT AND PLAN:  Lorna Guzman is a pleasant 65 year old female with a history of recurrent/metastatic right breast cancer.  Again, she previously underwent treatment with Adriamycin in 2006 and it was felt that she likely developed a mild chemo-induced cardiomyopathy after this.  Fortunately, with medical therapy, her EF has essentially normalized and stabilized.  She continues to take Herceptin, and we will continue every 3 month echos while she remains on this therapy.       I recommend a 3-month followup with an echo prior.  Of course, I encouraged her to contact us sooner with any questions or concerns. An AVS will be mailed to the patient.      Thank you for allowing me to participate in the care of this pleasant patient.  Please do not hesitate to contact us with further questions or concerns.     Sincerely,     Melisa Billingsley PA-C     [facility]

## 2020-10-06 NOTE — PROGRESS NOTES
"Service Date: 10/6/2020     Lorna Guzman is a 65 year old female who is being evaluated via a billable video visit.      The patient has been notified of following:     \"This video visit will be conducted via a call between you and your physician/provider. We have found that certain health care needs can be provided without the need for an in-person physical exam.  This service lets us provide the care you need with a video conversation.  If a prescription is necessary we can send it directly to your pharmacy.  If lab work is needed we can place an order for that and you can then stop by our lab to have the test done at a later time.    Video visits are billed at different rates depending on your insurance coverage.  Please reach out to your insurance provider with any questions.    If during the course of the call the physician/provider feels a video visit is not appropriate, you will not be charged for this service.\"    Patient has given verbal consent for Video visit? Yes  How would you like to obtain your AVS? Mail a copy  If you are dropped from the video visit, the video invite should be resent to: Text to cell phone: [phone]  Will anyone else be joining your video visit? No       Review Of Systems  Skin: negative  Eyes: negative  Ears/Nose/Throat: negative  Respiratory: Dyspnea on exertion- Climbing caused some sob  Cardiovascular: negative  Gastrointestinal: negative  Genitourinary: negative  Musculoskeletal: negative  Neurologic: numbness or tingling of hands and numbness or tingling of feet  Psychiatric: negative  Hematologic/Lymphatic/Immunologic: negative  Endocrine: negative    Patient reported vitals:  BP: 113/65  Heart rate: 69  Weight: 153lb    [facility]        Video-Visit Details    Type of service:  Video Visit    Video Start Time: 10:03 AM  Video End Time: 10:14 AM    Originating Location (pt. Location): Home    Distant Location (provider location):  [facility]     Platform used for Video Visit: [employer]       HISTORY OF PRESENTING COMPLAINT:  Lorna Guzman is a pleasant 65 year old female who is being evaluated via a billable video visit in order to minimize the possibility of exposure due to the current COVID-19 pandemic. This is a 3 month follow up.     Lorna is followed in our clinic from a Cardio-Oncology perspective due to a history of a chemo-induced cardiomyopathy and metastatic breast carcinoma with ongoing Herceptin use.  She was initially diagnosed with breast cancer in 2006, at which time she underwent a lumpectomy, AC chemotherapy and adjuvant radiation.  She was started on tamoxifen and after 2 years was transitioned to Aromasin.  She completed this treatment in 07/2013.  Unfortunately, in late 2017 she was again diagnosed with an invasive ductal carcinoma of the right breast.  She was also found to have evidence of metastatic disease (in the liver and a lymph node) at that time.  As part of her workup, she underwent an echocardiogram which showed mildly reduced LV systolic function with an EF of approximately 47%.  It was at that time that she was referred to Cardiology.  She had a stress cardiac MRI which showed normal LV size with mild to moderately reduced function and an LVEF of 43%.  She was also noted to have mitral valve prolapse and mild mitral regurgitation.  Stress imaging did not show any evidence of ischemia.  She was started on carvedilol and lisinopril at that time.  Her cancer was treated with 4 cycles of TC chemotherapy, and she was started on Herceptin on an every 3 week basis, which is likely to continue lifelong.  Fortunately, from a cardiac standpoint, followup cardiac MRIs and echocardiography have shown slight improvement/stability of her LVEF with ongoing therapy.  In the late summer/early fall of 2018 she did develop severe facial swelling and it was felt that this was related to angioedema  secondary to her lisinopril, which was discontinued.  At that time, we decided to continue with carvedilol alone unless we saw a decline in her LV systolic function. Spring 2019 she was noted to have a growing R breast mass, she was started on fulverstrant and ribociclib. She had a R mastectomy 10/2019 and had radiation in March 2020.      From a cardiac standpoint, she has been doing well and denies any cardiovascular symptoms.  She did note some GRAHAM when hiking [place] hill last week on the [place], but no other significant GRAHAM noted. Her next scan will be later this month and she will follow up with Dr Stoney vasquez.        CURRENT CARDIAC MEDICATIONS:   Current Outpatient Medications   Medication Sig Dispense Refill     carvedilol (COREG) 25 MG tablet Take 1 tablet (25 mg) by mouth 2 times daily (with meals) 180 tablet 3     fulvestrant (FASLODEX) 250 MG/5ML injection Inject into the muscle every 30 days       ribociclib (KISQALI 600 DOSE) 200 MG tablet Take 600 mg by mouth daily Three weeks on, one week off - repeat       trastuzumab (HERCEPTIN) 150 MG SOLR injection Inject into the vein every 21 days Chemo agent / HERCEPTIN       The remainder of the medications, allergies and review of systems were reviewed and as are documented.      PHYSICAL EXAMINATION:   GENERAL: Healthy, alert and no distress  EYES: Eyes grossly normal to inspection.  No discharge or erythema, or obvious scleral/conjunctival abnormalities.  RESP: No audible wheeze, cough, or visible cyanosis.  No visible retractions or increased work of breathing.    SKIN: Visible skin clear. No significant rash, abnormal pigmentation or lesions.  NEURO: Cranial nerves grossly intact.  Mentation and speech appropriate for age.  PSYCH: Mentation appears normal, affect normal/bright, judgement and insight intact, normal speech and appearance well-groomed.     TTE 10/2/2020:  Left ventricular systolic function is low normal.  LVEF 52% based on biplane 2D  tracing.  Global peak LV longitudinal strain is averaged at -18%. This is within  reported normal limits (normal <-18%).  There is mild to moderate (1-2+) mitral regurgitation.  Compared to prior study from 7/20, there is no significant change. The study  was technically adequate.    ASSESSMENT AND PLAN:  Lorna Guzman is a pleasant 65 year old female with a history of recurrent/metastatic right breast cancer.  Again, she previously underwent treatment with Adriamycin in 2006 and it was felt that she likely developed a mild chemo-induced cardiomyopathy after this.  Fortunately, with medical therapy, her EF has essentially normalized and stabilized.  She continues to take Herceptin, and we will continue every 3 month echos while she remains on this therapy.       I recommend a 3-month followup with an echo prior.  Of course, I encouraged her to contact us sooner with any questions or concerns. An AVS will be mailed to the patient.      Thank you for allowing me to participate in the care of this pleasant patient.  Please do not hesitate to contact us with further questions or concerns.         ANGEL MCDONNELL PA-C

## 2020-10-26 ENCOUNTER — TRANSFERRED RECORDS (OUTPATIENT)
Dept: HEALTH INFORMATION MANAGEMENT | Facility: CLINIC | Age: 65
End: 2020-10-26

## 2020-10-29 ENCOUNTER — DOCUMENTATION ONLY (OUTPATIENT)
Dept: CARDIOLOGY | Facility: CLINIC | Age: 65
End: 2020-10-29

## 2020-10-30 ENCOUNTER — TRANSFERRED RECORDS (OUTPATIENT)
Dept: HEALTH INFORMATION MANAGEMENT | Facility: CLINIC | Age: 65
End: 2020-10-30

## 2020-11-02 ENCOUNTER — TRANSFERRED RECORDS (OUTPATIENT)
Dept: HEALTH INFORMATION MANAGEMENT | Facility: CLINIC | Age: 65
End: 2020-11-02

## 2020-11-03 ENCOUNTER — DOCUMENTATION ONLY (OUTPATIENT)
Dept: CARDIOLOGY | Facility: CLINIC | Age: 65
End: 2020-11-03

## 2020-11-03 NOTE — PROGRESS NOTES
Cataract: Not visually significant to proceed with surgery at this time. I have discussed continuing with current spectacles vs updating. I have offered the patient a new spectacle prescription to fill if desires. Return to follow up as schedled or sooner if symptoms arise. Office note 10/30/2020 from Dr. Lua, [facility], sent to scan

## 2020-11-05 ENCOUNTER — DOCUMENTATION ONLY (OUTPATIENT)
Dept: CARDIOLOGY | Facility: CLINIC | Age: 65
End: 2020-11-05

## 2020-11-25 ENCOUNTER — TRANSFERRED RECORDS (OUTPATIENT)
Dept: HEALTH INFORMATION MANAGEMENT | Facility: CLINIC | Age: 65
End: 2020-11-25

## 2021-01-05 ENCOUNTER — TRANSFERRED RECORDS (OUTPATIENT)
Dept: HEALTH INFORMATION MANAGEMENT | Facility: CLINIC | Age: 66
End: 2021-01-05

## 2021-01-08 ENCOUNTER — DOCUMENTATION ONLY (OUTPATIENT)
Dept: CARDIOLOGY | Facility: CLINIC | Age: 66
End: 2021-01-08

## 2021-01-14 ENCOUNTER — HEALTH MAINTENANCE LETTER (OUTPATIENT)
Age: 66
End: 2021-01-14

## 2021-01-22 ENCOUNTER — OFFICE VISIT (OUTPATIENT)
Dept: FAMILY MEDICINE | Facility: CLINIC | Age: 66
End: 2021-01-22

## 2021-01-22 VITALS
OXYGEN SATURATION: 96 % | TEMPERATURE: 98.2 F | DIASTOLIC BLOOD PRESSURE: 60 MMHG | HEIGHT: 68 IN | SYSTOLIC BLOOD PRESSURE: 110 MMHG | WEIGHT: 137.2 LBS | BODY MASS INDEX: 20.79 KG/M2 | HEART RATE: 120 BPM

## 2021-01-22 DIAGNOSIS — Z76.89 HEALTH CARE HOME: ICD-10-CM

## 2021-01-22 DIAGNOSIS — R00.0 TACHYCARDIA: ICD-10-CM

## 2021-01-22 DIAGNOSIS — C50.911 MALIGNANT NEOPLASM OF RIGHT FEMALE BREAST, UNSPECIFIED ESTROGEN RECEPTOR STATUS, UNSPECIFIED SITE OF BREAST (H): ICD-10-CM

## 2021-01-22 DIAGNOSIS — I42.9 CARDIOMYOPATHY, IDIOPATHIC (H): ICD-10-CM

## 2021-01-22 DIAGNOSIS — R73.9 HYPERGLYCEMIA: Primary | ICD-10-CM

## 2021-01-22 LAB
% GRANULOCYTES: 77.5 %
GLUCOSE SERPL-MCNC: 141 MG/DL (ref 60–99)
HBA1C MFR BLD: 5.9 % (ref 4–7)
HCT VFR BLD AUTO: 29.9 % (ref 35–47)
HEMOGLOBIN: 9.8 G/DL (ref 11.7–15.7)
LYMPHOCYTES NFR BLD AUTO: 10.6 %
MCH RBC QN AUTO: 32.7 PG (ref 26–33)
MCHC RBC AUTO-ENTMCNC: 32.8 G/DL (ref 31–36)
MCV RBC AUTO: 99.7 FL (ref 78–100)
MONOCYTES NFR BLD AUTO: 11.9 %
PLATELET COUNT - QUEST: 293 10^9/L (ref 150–375)
RBC # BLD AUTO: 3 10*12/L (ref 3.8–5.2)
WBC # BLD AUTO: 7.7 10*9/L (ref 4–11)

## 2021-01-22 PROCEDURE — 36415 COLL VENOUS BLD VENIPUNCTURE: CPT | Performed by: FAMILY MEDICINE

## 2021-01-22 PROCEDURE — 93000 ELECTROCARDIOGRAM COMPLETE: CPT | Performed by: FAMILY MEDICINE

## 2021-01-22 PROCEDURE — 82947 ASSAY GLUCOSE BLOOD QUANT: CPT | Performed by: FAMILY MEDICINE

## 2021-01-22 PROCEDURE — 83036 HEMOGLOBIN GLYCOSYLATED A1C: CPT | Performed by: FAMILY MEDICINE

## 2021-01-22 PROCEDURE — 99214 OFFICE O/P EST MOD 30 MIN: CPT | Performed by: FAMILY MEDICINE

## 2021-01-22 PROCEDURE — 85025 COMPLETE CBC W/AUTO DIFF WBC: CPT | Performed by: FAMILY MEDICINE

## 2021-01-22 RX ORDER — DEXAMETHASONE 4 MG/1
TABLET ORAL
COMMUNITY
Start: 2020-12-31 | End: 2021-01-22

## 2021-01-22 ASSESSMENT — MIFFLIN-ST. JEOR: SCORE: 1217.9

## 2021-01-22 NOTE — LETTER
[facility]  [address]  [address]  [facility] [address]  [phone]      January 22, 2021      Lorna A Thomas  [address]  [facility] [address]      EMERGENCY CARE PLAN  Presenting Problem Treatment Plan   Questions or concerns during clinic hours I will call the clinic directly:    [facility]  [address], [address]  [place] [address]  [phone]   Questions or concerns outside clinic hours  I will call the 24 hour line at [phone]   Patient needs to schedule an appointment  I will call the  scheduling line at [phone]   Same day treatment   I will call the clinic first, then  urgent care and/or  express care if needed   Clinic Care Coordinators Rox Dwyer RN:  [phone]  [facility] Clinical Support Staff: [phone]    Crisis Services:  Behavioral or Mental Health P (Behavioral Health Providers)   [phone]   Emergency treatment--Immediately CALL [phone]

## 2021-01-22 NOTE — PROGRESS NOTES
"SUBJECTIVE:  65 yr old hx of metastatic breast cancer  Was in oncology yesterday bloood sugar was 430  Has been weaker more SOB and some mild increase in thirst  No polyuria or dysuria  Under chemo therapy and on steroids recently    Patient Active Problem List   Diagnosis     Breast cancer (H)     Cardiomyopathy, idiopathic (H)     Malignant neoplasm of right female breast, unspecified estrogen receptor status, unspecified site of breast (H)     Right breast cancer with T3 tumor, >5 cm in greatest dimension (H)     MVP (mitral valve prolapse)     Mitral regurgitation     Hyperlipidemia     ACP (advance care planning)     Health Care Home     Current Outpatient Medications   Medication Instructions     carvedilol (COREG) 25 mg, Oral, 2 TIMES DAILY WITH MEALS     PACLITAXEL IV Intravenous     PERTUZUMAB IV Intravenous     trastuzumab (HERCEPTIN) 150 MG SOLR injection Intravenous, EVERY 21 DAYS, Chemo agent / HERCEPTIN      ROS: 10 point ROS neg other than the symptoms noted above in the HPI.  /60 (BP Location: Left arm, Patient Position: Sitting, Cuff Size: Adult Large)   Pulse 120   Temp 98.2  F (36.8  C) (Oral)   Ht 1.715 m (5' 7.5\")   Wt 62.2 kg (137 lb 3.2 oz)   LMP  (LMP Unknown)   SpO2 96%   BMI 21.17 kg/m    GENERAL: no apparent distress  EYES: Conjunctiva are not injected, no discharge.  EARS: Left TM -no erythema, no effusion,  not bulged.               Right TM -no erythema, no effusion,  not bulged.  NOSE: no discharge, no sinus tenderness  THROAT: no erythema, no exudate, no lesions  NECK: supple, no adenopathy.  CARDIAC: regular rhythm, tachy, no murmur  RESP: clear, no wheezing, no rales, no rhonchi  ABD: soft, no distension, no tenderness  SKIN: No rashes    Recent Results (from the past 24 hour(s))   Glucose Fasting (BFP)    Collection Time: 01/22/21  2:27 PM   Result Value Ref Range    Glucose 141 (A) 60 - 99 mg/dL   HEMOGRAM PLATELET DIFF (BFP)    Collection Time: 01/22/21  2:27 PM "   Result Value Ref Range    WBC 7.7 4.0 - 11 10*9/L    RBC Count 3.00 (A) 3.8 - 5.2 10*12/L    Hemoglobin 9.8 (A) 11.7 - 15.7 g/dL    Hematocrit 29.9 (A) 35.0 - 47.0 %    MCV 99.7 78 - 100 fL    MCH 32.7 26 - 33 pg    MCHC 32.8 31 - 36 g/dL    Platelet Count 293 150 - 375 10^9/L    % Granulocytes 77.5 %    % Lymphocytes 10.6 %    % Monocytes 11.9 %   Hemoglobin A1c (BFP)    Collection Time: 01/22/21  2:29 PM   Result Value Ref Range    Hemoglobin A1C 5.9 4.0 - 7.0 %     (R73.9) Hyperglycemia  (primary encounter diagnosis)  Comment: marked improvement in blood sugar with near nml non fasting blood sugar  Plan: VENOUS COLLECTION, Glucose Fasting (BFP),         Hemoglobin A1c (BFP), CANCELED: Hemoglobin A1c        Increase fluids    (Z76.89) Health Care Home  Comment:   Plan: VENOUS COLLECTION            (R00.0) Tachycardia  Comment: increase fluids  Plan: VENOUS COLLECTION, HEMOGRAM PLATELET DIFF         (BFP), EKG 12-lead complete w/read - Clinics            (C50.911) Malignant neoplasm of right female breast, unspecified estrogen receptor status, unspecified site of breast (H)  Comment:   Plan:     (I42.8) Cardiomyopathy, idiopathic (H)  Comment: has echo on Monday  Plan:

## 2021-01-22 NOTE — NURSING NOTE
Lorna is here to establish care and discuss elevated glucose.     Pre-Visit Screening:  Immunizations:UTD  Colonoscopy:NA  Mammogram:NA  Asthma Action Test/Plan:NA  PHQ9:NA  GAD7:NA  Questioned patient about current smoking habits Pt.never smoked  OK to leave a detailed message on voice mail for today's visit yes, phone # [phone]

## 2021-01-25 ENCOUNTER — HOSPITAL ENCOUNTER (OUTPATIENT)
Dept: CARDIOLOGY | Facility: CLINIC | Age: 66
Discharge: HOME | End: 2021-01-25
Attending: PHYSICIAN ASSISTANT | Admitting: PHYSICIAN ASSISTANT
Payer: MEDICARE

## 2021-01-25 DIAGNOSIS — I42.9 SECONDARY CARDIOMYOPATHY (H): ICD-10-CM

## 2021-01-25 DIAGNOSIS — C50.911 MALIGNANT NEOPLASM OF RIGHT FEMALE BREAST, UNSPECIFIED ESTROGEN RECEPTOR STATUS, UNSPECIFIED SITE OF BREAST (H): ICD-10-CM

## 2021-01-25 PROCEDURE — 93325 DOPPLER ECHO COLOR FLOW MAPG: CPT | Mod: 26 | Performed by: INTERNAL MEDICINE

## 2021-01-25 PROCEDURE — 93308 TTE F-UP OR LMTD: CPT | Mod: 26 | Performed by: INTERNAL MEDICINE

## 2021-01-25 PROCEDURE — 93325 DOPPLER ECHO COLOR FLOW MAPG: CPT

## 2021-01-25 PROCEDURE — 93321 DOPPLER ECHO F-UP/LMTD STD: CPT | Mod: 26 | Performed by: INTERNAL MEDICINE

## 2021-01-29 ENCOUNTER — VIRTUAL VISIT (OUTPATIENT)
Dept: CARDIOLOGY | Facility: CLINIC | Age: 66
End: 2021-01-29
Attending: PHYSICIAN ASSISTANT
Payer: MEDICARE

## 2021-01-29 DIAGNOSIS — I42.9 SECONDARY CARDIOMYOPATHY (H): ICD-10-CM

## 2021-01-29 DIAGNOSIS — C50.911 MALIGNANT NEOPLASM OF RIGHT FEMALE BREAST, UNSPECIFIED ESTROGEN RECEPTOR STATUS, UNSPECIFIED SITE OF BREAST (H): ICD-10-CM

## 2021-01-29 PROCEDURE — 99214 OFFICE O/P EST MOD 30 MIN: CPT | Mod: 95 | Performed by: PHYSICIAN ASSISTANT

## 2021-01-29 NOTE — LETTER
1/29/2021    Max Moreno MD  [address]  [facility] [address]    RE: Lorna Guzman       Dear Colleague,    I had the pleasure of seeing Lorna Guzman in the [facility].    Lorna is a 65 year old who is being evaluated via a billable video visit.      How would you like to obtain your AVS? MyChart  If the video visit is dropped, the invitation should be resent by: Text to cell phone: [phone]  Will anyone else be joining your video visit? No     Review Of Systems  Skin: NEGATIVE  Eyes:Ears/Nose/Throat: NEGATIVE  Respiratory: NEGATIVE  Cardiovascular: fatigued  Gastrointestinal: NEGATIVE  Genitourinary:NEGATIVE   Musculoskeletal: NEGATIVE  Neurologic: NEGATIVE  Psychiatric: NEGATIVE  Hematologic/Lymphatic/Immunologic: NEGATIVE  Endocrine:  NEGATIVE    Vitals - Patient Reported  Systolic (Patient Reported): 121  Diastolic (Patient Reported): 77  Weight (Patient Reported): 63 kg (139 lb)  Pulse (Patient Reported): 102      Video-Visit Details    Type of service:  Video Visit    Video Start Time: 11:10 AM   Video End Time:11:18 AM    Originating Location (pt. Location): Home  Distant Location (provider location):  [facility]     Platform used for Video Visit: [employer]    Service Date: 1/29/2021      HISTORY OF PRESENTING COMPLAINT:  Lorna Guzman is a pleasant 65 year old female who is being evaluated via a billable video visit in order to minimize the possibility of exposure due to the current COVID-19 pandemic. This is a 3 month follow up.     Lorna is followed in our clinic from a Cardio-Oncology perspective due to a history of a chemo-induced cardiomyopathy and metastatic breast carcinoma with ongoing Herceptin use.  She was initially diagnosed with breast cancer in 2006, at which time she underwent a lumpectomy, AC chemotherapy and adjuvant radiation.  She was started on tamoxifen and after 2 years was transitioned  to Aromasin.  She completed this treatment in 07/2013.  Unfortunately, in late 2017 she was again diagnosed with an invasive ductal carcinoma of the right breast.  She was also found to have evidence of metastatic disease (in the liver and a lymph node) at that time.  As part of her workup, she underwent an echocardiogram which showed mildly reduced LV systolic function with an EF of approximately 47%.  It was at that time that she was referred to Cardiology.  She had a stress cardiac MRI which showed normal LV size with mild to moderately reduced function and an LVEF of 43%.  She was also noted to have mitral valve prolapse and mild mitral regurgitation.  Stress imaging did not show any evidence of ischemia.  She was started on carvedilol and lisinopril at that time.  Her cancer was treated with 4 cycles of TC chemotherapy, and she was started on Herceptin on an every 3 week basis, which is likely to continue lifelong.  Fortunately, from a cardiac standpoint, followup cardiac MRIs and echocardiography have shown slight improvement/stability of her LVEF with ongoing therapy.  In the late summer/early fall of 2018 she did develop severe facial swelling and it was felt that this was related to angioedema secondary to her lisinopril, which was discontinued.  At that time, we decided to continue with carvedilol alone unless we saw a decline in her LV systolic function. Spring 2019 she was noted to have a growing R breast mass, she was started on fulverstrant and ribociclib. She had a R mastectomy 10/2019 and had radiation in March 2020.      Unfortunately since our last visit she was found to have more metastatic disease in her chest/lungs. Fulverstrant and ribociclib stopped and she has started chemo with paclitaxel, pertuzumab and trastuzumab, she has completed 9/12 cycles. She is feeling GRAHAM/SOB which is felt to be from her pulmonary findings. No LE edema, orthopnea or PND.    CURRENT CARDIAC MEDICATIONS:   Current  Outpatient Medications   Medication Sig Dispense Refill     carvedilol (COREG) 25 MG tablet Take 1 tablet (25 mg) by mouth 2 times daily (with meals) 180 tablet 3     PACLITAXEL IV        PERTUZUMAB IV        trastuzumab (HERCEPTIN) 150 MG SOLR injection Inject into the vein every 21 days Chemo agent / HERCEPTIN       The remainder of the medications, allergies and review of systems were reviewed and as are documented.      PHYSICAL EXAMINATION:   GENERAL: Healthy, alert and no distress  EYES: Eyes grossly normal to inspection.  No discharge or erythema, or obvious scleral/conjunctival abnormalities.  RESP: No audible wheeze, cough, or visible cyanosis.  No visible retractions or increased work of breathing.    SKIN: Visible skin clear. No significant rash, abnormal pigmentation or lesions.  NEURO: Cranial nerves grossly intact.  Mentation and speech appropriate for age.  PSYCH: Mentation appears normal, affect normal/bright, judgement and insight intact, normal speech and appearance well-groomed.     TTE 1/25/2021:  The left ventricle is normal in size. Left ventricular systolic function is low normal. The visual ejection fraction is estimated at 50-55%. Global peak LV longitudinal strain is averaged at -18.4%. This is within reported normal limits (normal <-18%).  The right ventricle is normal size. The right ventricular systolic function is normal.  There is mild to moderate (1-2+) mitral regurgitation.  No pericardial effusion.  In comparison to the previous report dated 10/02/2020, the findings are similar.    ASSESSMENT AND PLAN:  Lorna Guzman is a pleasant 65 year old female with a history of recurrent/metastatic right breast cancer.  Again, she previously underwent treatment with Adriamycin in 2006 and it was felt that she likely developed a mild chemo-induced cardiomyopathy after this.  Fortunately, with medical therapy, her EF has essentially normalized and stabilized.  She continues to take  Herceptin (Trastuzumab), and we will continue every 3 month echos while she remains on this therapy.       I recommend a 3-month followup with an echo prior.  Of course, I encouraged her to contact us sooner with any questions or concerns. An AVS will be mailed to the patient.      Thank you for allowing me to participate in the care of this pleasant patient.  Please do not hesitate to contact us with further questions or concerns.      30 minutes spent on the date of the encounter doing chart review, review of outside records, interpretation of tests, patient visit and documentation       Thank you for allowing me to participate in the care of your patient.    Sincerely,     Melisa Billingsley PA-C     [facility]

## 2021-01-29 NOTE — PROGRESS NOTES
Lorna is a 65 year old who is being evaluated via a billable video visit.      How would you like to obtain your AVS? MyChart  If the video visit is dropped, the invitation should be resent by: Text to cell phone: [phone]  Will anyone else be joining your video visit? No     Review Of Systems  Skin: NEGATIVE  Eyes:Ears/Nose/Throat: NEGATIVE  Respiratory: NEGATIVE  Cardiovascular: fatigued  Gastrointestinal: NEGATIVE  Genitourinary:NEGATIVE   Musculoskeletal: NEGATIVE  Neurologic: NEGATIVE  Psychiatric: NEGATIVE  Hematologic/Lymphatic/Immunologic: NEGATIVE  Endocrine:  NEGATIVE    Vitals - Patient Reported  Systolic (Patient Reported): 121  Diastolic (Patient Reported): 77  Weight (Patient Reported): 63 kg (139 lb)  Pulse (Patient Reported): 102      Video-Visit Details    Type of service:  Video Visit    Video Start Time: 11:10 AM   Video End Time:11:18 AM    Originating Location (pt. Location): Home  Distant Location (provider location):  [facility]     Platform used for Video Visit: [employer]    Service Date: 1/29/2021      HISTORY OF PRESENTING COMPLAINT:  Lorna Guzman is a pleasant 65 year old female who is being evaluated via a billable video visit in order to minimize the possibility of exposure due to the current COVID-19 pandemic. This is a 3 month follow up.     Lorna is followed in our clinic from a Cardio-Oncology perspective due to a history of a chemo-induced cardiomyopathy and metastatic breast carcinoma with ongoing Herceptin use.  She was initially diagnosed with breast cancer in 2006, at which time she underwent a lumpectomy, AC chemotherapy and adjuvant radiation.  She was started on tamoxifen and after 2 years was transitioned to Aromasin.  She completed this treatment in 07/2013.  Unfortunately, in late 2017 she was again diagnosed with an invasive ductal carcinoma of the right breast.  She was also found to have evidence of metastatic disease (in the liver  and a lymph node) at that time.  As part of her workup, she underwent an echocardiogram which showed mildly reduced LV systolic function with an EF of approximately 47%.  It was at that time that she was referred to Cardiology.  She had a stress cardiac MRI which showed normal LV size with mild to moderately reduced function and an LVEF of 43%.  She was also noted to have mitral valve prolapse and mild mitral regurgitation.  Stress imaging did not show any evidence of ischemia.  She was started on carvedilol and lisinopril at that time.  Her cancer was treated with 4 cycles of TC chemotherapy, and she was started on Herceptin on an every 3 week basis, which is likely to continue lifelong.  Fortunately, from a cardiac standpoint, followup cardiac MRIs and echocardiography have shown slight improvement/stability of her LVEF with ongoing therapy.  In the late summer/early fall of 2018 she did develop severe facial swelling and it was felt that this was related to angioedema secondary to her lisinopril, which was discontinued.  At that time, we decided to continue with carvedilol alone unless we saw a decline in her LV systolic function. Spring 2019 she was noted to have a growing R breast mass, she was started on fulverstrant and ribociclib. She had a R mastectomy 10/2019 and had radiation in March 2020.      Unfortunately since our last visit she was found to have more metastatic disease in her chest/lungs. Fulverstrant and ribociclib stopped and she has started chemo with paclitaxel, pertuzumab and trastuzumab, she has completed 9/12 cycles. She is feeling GRAHAM/SOB which is felt to be from her pulmonary findings. No LE edema, orthopnea or PND.    CURRENT CARDIAC MEDICATIONS:   Current Outpatient Medications   Medication Sig Dispense Refill     carvedilol (COREG) 25 MG tablet Take 1 tablet (25 mg) by mouth 2 times daily (with meals) 180 tablet 3     PACLITAXEL IV        PERTUZUMAB IV        trastuzumab (HERCEPTIN) 150  MG SOLR injection Inject into the vein every 21 days Chemo agent / HERCEPTIN       The remainder of the medications, allergies and review of systems were reviewed and as are documented.      PHYSICAL EXAMINATION:   GENERAL: Healthy, alert and no distress  EYES: Eyes grossly normal to inspection.  No discharge or erythema, or obvious scleral/conjunctival abnormalities.  RESP: No audible wheeze, cough, or visible cyanosis.  No visible retractions or increased work of breathing.    SKIN: Visible skin clear. No significant rash, abnormal pigmentation or lesions.  NEURO: Cranial nerves grossly intact.  Mentation and speech appropriate for age.  PSYCH: Mentation appears normal, affect normal/bright, judgement and insight intact, normal speech and appearance well-groomed.     TTE 1/25/2021:  The left ventricle is normal in size. Left ventricular systolic function is low normal. The visual ejection fraction is estimated at 50-55%. Global peak LV longitudinal strain is averaged at -18.4%. This is within reported normal limits (normal <-18%).  The right ventricle is normal size. The right ventricular systolic function is normal.  There is mild to moderate (1-2+) mitral regurgitation.  No pericardial effusion.  In comparison to the previous report dated 10/02/2020, the findings are similar.    ASSESSMENT AND PLAN:  Lorna Guzman is a pleasant 65 year old female with a history of recurrent/metastatic right breast cancer.  Again, she previously underwent treatment with Adriamycin in 2006 and it was felt that she likely developed a mild chemo-induced cardiomyopathy after this.  Fortunately, with medical therapy, her EF has essentially normalized and stabilized.  She continues to take Herceptin (Trastuzumab), and we will continue every 3 month echos while she remains on this therapy.       I recommend a 3-month followup with an echo prior.  Of course, I encouraged her to contact us sooner with any questions or concerns. An  AVS will be mailed to the patient.      Thank you for allowing me to participate in the care of this pleasant patient.  Please do not hesitate to contact us with further questions or concerns.      30 minutes spent on the date of the encounter doing chart review, review of outside records, interpretation of tests, patient visit and documentation      ANGEL MCDONNELL PA-C

## 2021-02-06 ENCOUNTER — APPOINTMENT (OUTPATIENT)
Dept: ULTRASOUND IMAGING | Facility: CLINIC | Age: 66
DRG: 175 | End: 2021-02-06
Attending: HOSPITALIST
Payer: MEDICARE

## 2021-02-06 ENCOUNTER — APPOINTMENT (OUTPATIENT)
Dept: CT IMAGING | Facility: CLINIC | Age: 66
DRG: 175 | End: 2021-02-06
Attending: EMERGENCY MEDICINE
Payer: MEDICARE

## 2021-02-06 ENCOUNTER — HOSPITAL ENCOUNTER (INPATIENT)
Facility: CLINIC | Age: 66
LOS: 4 days | Discharge: HOME | DRG: 175 | End: 2021-02-10
Attending: EMERGENCY MEDICINE | Admitting: HOSPITALIST
Payer: MEDICARE

## 2021-02-06 DIAGNOSIS — E87.20 LACTIC ACIDOSIS: ICD-10-CM

## 2021-02-06 DIAGNOSIS — J18.9 COMMUNITY ACQUIRED PNEUMONIA, UNSPECIFIED LATERALITY: Primary | ICD-10-CM

## 2021-02-06 DIAGNOSIS — J96.01 ACUTE RESPIRATORY FAILURE WITH HYPOXIA (H): ICD-10-CM

## 2021-02-06 DIAGNOSIS — I21.4 NSTEMI (NON-ST ELEVATED MYOCARDIAL INFARCTION) (H): ICD-10-CM

## 2021-02-06 DIAGNOSIS — I42.9 SECONDARY CARDIOMYOPATHY (H): ICD-10-CM

## 2021-02-06 DIAGNOSIS — I26.99 OTHER ACUTE PULMONARY EMBOLISM, UNSPECIFIED WHETHER ACUTE COR PULMONALE PRESENT (H): ICD-10-CM

## 2021-02-06 LAB
ANION GAP SERPL CALCULATED.3IONS-SCNC: 8 MMOL/L (ref 3–14)
APTT PPP: 43 SEC (ref 22–37)
BASE DEFICIT BLDV-SCNC: 2.2 MMOL/L
BASE EXCESS BLDV CALC-SCNC: 0.3 MMOL/L
BASOPHILS # BLD AUTO: 0 10E9/L (ref 0–0.2)
BASOPHILS NFR BLD AUTO: 0.2 %
BUN SERPL-MCNC: 10 MG/DL (ref 7–30)
CALCIUM SERPL-MCNC: 8.8 MG/DL (ref 8.5–10.1)
CHLORIDE SERPL-SCNC: 100 MMOL/L (ref 94–109)
CO2 SERPL-SCNC: 24 MMOL/L (ref 20–32)
CREAT BLD-MCNC: 0.8 MG/DL (ref 0.52–1.04)
CREAT SERPL-MCNC: 0.77 MG/DL (ref 0.52–1.04)
DIFFERENTIAL METHOD BLD: ABNORMAL
EOSINOPHIL # BLD AUTO: 0.1 10E9/L (ref 0–0.7)
EOSINOPHIL NFR BLD AUTO: 0.8 %
ERYTHROCYTE [DISTWIDTH] IN BLOOD BY AUTOMATED COUNT: 17.3 % (ref 10–15)
ERYTHROCYTE [DISTWIDTH] IN BLOOD BY AUTOMATED COUNT: 17.6 % (ref 10–15)
GFR SERPL CREATININE-BSD FRML MDRD: 72 ML/MIN/{1.73_M2}
GFR SERPL CREATININE-BSD FRML MDRD: 81 ML/MIN/{1.73_M2}
GLUCOSE BLDC GLUCOMTR-MCNC: 129 MG/DL (ref 70–99)
GLUCOSE BLDC GLUCOMTR-MCNC: 147 MG/DL (ref 70–99)
GLUCOSE SERPL-MCNC: 214 MG/DL (ref 70–99)
HBA1C MFR BLD: 5.5 % (ref 0–5.6)
HCO3 BLDV-SCNC: 25 MMOL/L (ref 21–28)
HCO3 BLDV-SCNC: 26 MMOL/L (ref 21–28)
HCT VFR BLD AUTO: 28.7 % (ref 35–47)
HCT VFR BLD AUTO: 31.9 % (ref 35–47)
HGB BLD-MCNC: 8.9 G/DL (ref 11.7–15.7)
HGB BLD-MCNC: 9.6 G/DL (ref 11.7–15.7)
IMM GRANULOCYTES # BLD: 0.1 10E9/L (ref 0–0.4)
IMM GRANULOCYTES NFR BLD: 1.2 %
INR PPP: 1.07 (ref 0.86–1.14)
LABORATORY COMMENT REPORT: NORMAL
LACTATE BLD-SCNC: 1.8 MMOL/L (ref 0.7–2)
LACTATE BLD-SCNC: 5.1 MMOL/L (ref 0.7–2)
LYMPHOCYTES # BLD AUTO: 1 10E9/L (ref 0.8–5.3)
LYMPHOCYTES NFR BLD AUTO: 9.8 %
MCH RBC QN AUTO: 31.1 PG (ref 26.5–33)
MCH RBC QN AUTO: 31.3 PG (ref 26.5–33)
MCHC RBC AUTO-ENTMCNC: 30.1 G/DL (ref 31.5–36.5)
MCHC RBC AUTO-ENTMCNC: 31 G/DL (ref 31.5–36.5)
MCV RBC AUTO: 100 FL (ref 78–100)
MCV RBC AUTO: 104 FL (ref 78–100)
MONOCYTES # BLD AUTO: 0.3 10E9/L (ref 0–1.3)
MONOCYTES NFR BLD AUTO: 3 %
NEUTROPHILS # BLD AUTO: 8.6 10E9/L (ref 1.6–8.3)
NEUTROPHILS NFR BLD AUTO: 85 %
NRBC # BLD AUTO: 0 10*3/UL
NRBC BLD AUTO-RTO: 0 /100
NT-PROBNP SERPL-MCNC: 84 PG/ML (ref 0–900)
O2/TOTAL GAS SETTING VFR VENT: ABNORMAL %
O2/TOTAL GAS SETTING VFR VENT: NORMAL %
OXYHGB MFR BLDV: 11 %
OXYHGB MFR BLDV: 43 %
PCO2 BLDV: 44 MM HG (ref 40–50)
PCO2 BLDV: 52 MM HG (ref 40–50)
PH BLDV: 7.29 PH (ref 7.32–7.43)
PH BLDV: 7.38 PH (ref 7.32–7.43)
PLATELET # BLD AUTO: 220 10E9/L (ref 150–450)
PLATELET # BLD AUTO: 260 10E9/L (ref 150–450)
PO2 BLDV: 12 MM HG (ref 25–47)
PO2 BLDV: 27 MM HG (ref 25–47)
POTASSIUM SERPL-SCNC: 4.6 MMOL/L (ref 3.4–5.3)
RBC # BLD AUTO: 2.86 10E12/L (ref 3.8–5.2)
RBC # BLD AUTO: 3.07 10E12/L (ref 3.8–5.2)
SARS-COV-2 RNA RESP QL NAA+PROBE: NEGATIVE
SODIUM SERPL-SCNC: 132 MMOL/L (ref 133–144)
SPECIMEN SOURCE: NORMAL
TROPONIN I SERPL-MCNC: 0.14 UG/L (ref 0–0.04)
TROPONIN I SERPL-MCNC: 0.59 UG/L (ref 0–0.04)
WBC # BLD AUTO: 10.1 10E9/L (ref 4–11)
WBC # BLD AUTO: 9.5 10E9/L (ref 4–11)

## 2021-02-06 PROCEDURE — 85730 THROMBOPLASTIN TIME PARTIAL: CPT | Performed by: HOSPITALIST

## 2021-02-06 PROCEDURE — 250N000009 HC RX 250: Performed by: EMERGENCY MEDICINE

## 2021-02-06 PROCEDURE — 85027 COMPLETE CBC AUTOMATED: CPT | Performed by: HOSPITALIST

## 2021-02-06 PROCEDURE — 84484 ASSAY OF TROPONIN QUANT: CPT | Performed by: EMERGENCY MEDICINE

## 2021-02-06 PROCEDURE — 36415 COLL VENOUS BLD VENIPUNCTURE: CPT | Performed by: HOSPITALIST

## 2021-02-06 PROCEDURE — 250N000011 HC RX IP 250 OP 636: Performed by: EMERGENCY MEDICINE

## 2021-02-06 PROCEDURE — 83605 ASSAY OF LACTIC ACID: CPT | Performed by: EMERGENCY MEDICINE

## 2021-02-06 PROCEDURE — 999N001017 HC STATISTIC GLUCOSE BY METER IP

## 2021-02-06 PROCEDURE — 250N000013 HC RX MED GY IP 250 OP 250 PS 637: Performed by: EMERGENCY MEDICINE

## 2021-02-06 PROCEDURE — 96372 THER/PROPH/DIAG INJ SC/IM: CPT | Performed by: EMERGENCY MEDICINE

## 2021-02-06 PROCEDURE — 84484 ASSAY OF TROPONIN QUANT: CPT | Performed by: HOSPITALIST

## 2021-02-06 PROCEDURE — 82565 ASSAY OF CREATININE: CPT

## 2021-02-06 PROCEDURE — 82805 BLOOD GASES W/O2 SATURATION: CPT | Performed by: EMERGENCY MEDICINE

## 2021-02-06 PROCEDURE — 96360 HYDRATION IV INFUSION INIT: CPT

## 2021-02-06 PROCEDURE — 258N000003 HC RX IP 258 OP 636: Performed by: EMERGENCY MEDICINE

## 2021-02-06 PROCEDURE — 87635 SARS-COV-2 COVID-19 AMP PRB: CPT | Performed by: EMERGENCY MEDICINE

## 2021-02-06 PROCEDURE — 99223 1ST HOSP IP/OBS HIGH 75: CPT | Mod: AI | Performed by: HOSPITALIST

## 2021-02-06 PROCEDURE — 85025 COMPLETE CBC W/AUTO DIFF WBC: CPT | Performed by: EMERGENCY MEDICINE

## 2021-02-06 PROCEDURE — 80048 BASIC METABOLIC PNL TOTAL CA: CPT | Performed by: EMERGENCY MEDICINE

## 2021-02-06 PROCEDURE — 83880 ASSAY OF NATRIURETIC PEPTIDE: CPT | Performed by: EMERGENCY MEDICINE

## 2021-02-06 PROCEDURE — 93005 ELECTROCARDIOGRAM TRACING: CPT

## 2021-02-06 PROCEDURE — 83036 HEMOGLOBIN GLYCOSYLATED A1C: CPT | Performed by: HOSPITALIST

## 2021-02-06 PROCEDURE — 93970 EXTREMITY STUDY: CPT

## 2021-02-06 PROCEDURE — 258N000003 HC RX IP 258 OP 636: Performed by: HOSPITALIST

## 2021-02-06 PROCEDURE — 99291 CRITICAL CARE FIRST HOUR: CPT

## 2021-02-06 PROCEDURE — 200N000001 HC R&B ICU

## 2021-02-06 PROCEDURE — 99207 PR CDG-CODE CATEGORY CHANGED: CPT | Performed by: HOSPITALIST

## 2021-02-06 PROCEDURE — 71275 CT ANGIOGRAPHY CHEST: CPT | Mod: ME

## 2021-02-06 PROCEDURE — 85610 PROTHROMBIN TIME: CPT | Performed by: EMERGENCY MEDICINE

## 2021-02-06 RX ORDER — NICOTINE POLACRILEX 4 MG
15-30 LOZENGE BUCCAL
Status: DISCONTINUED | OUTPATIENT
Start: 2021-02-06 | End: 2021-02-10 | Stop reason: HOSPADM

## 2021-02-06 RX ORDER — DEXTROSE MONOHYDRATE 25 G/50ML
25-50 INJECTION, SOLUTION INTRAVENOUS
Status: DISCONTINUED | OUTPATIENT
Start: 2021-02-06 | End: 2021-02-06

## 2021-02-06 RX ORDER — HEPARIN SODIUM 10000 [USP'U]/100ML
0-5000 INJECTION, SOLUTION INTRAVENOUS CONTINUOUS
Status: CANCELLED | OUTPATIENT
Start: 2021-02-07

## 2021-02-06 RX ORDER — PROCHLORPERAZINE MALEATE 5 MG
10 TABLET ORAL EVERY 6 HOURS PRN
Status: DISCONTINUED | OUTPATIENT
Start: 2021-02-06 | End: 2021-02-10 | Stop reason: HOSPADM

## 2021-02-06 RX ORDER — IOPAMIDOL 755 MG/ML
500 INJECTION, SOLUTION INTRAVASCULAR ONCE
Status: COMPLETED | OUTPATIENT
Start: 2021-02-06 | End: 2021-02-06

## 2021-02-06 RX ORDER — PROCHLORPERAZINE 25 MG
25 SUPPOSITORY, RECTAL RECTAL EVERY 12 HOURS PRN
Status: DISCONTINUED | OUTPATIENT
Start: 2021-02-06 | End: 2021-02-10 | Stop reason: HOSPADM

## 2021-02-06 RX ORDER — ONDANSETRON 4 MG/1
4 TABLET, ORALLY DISINTEGRATING ORAL EVERY 6 HOURS PRN
Status: DISCONTINUED | OUTPATIENT
Start: 2021-02-06 | End: 2021-02-10 | Stop reason: HOSPADM

## 2021-02-06 RX ORDER — ASPIRIN 81 MG/1
324 TABLET, CHEWABLE ORAL ONCE
Status: COMPLETED | OUTPATIENT
Start: 2021-02-06 | End: 2021-02-06

## 2021-02-06 RX ORDER — DEXAMETHASONE 4 MG/1
4 TABLET ORAL
COMMUNITY
End: 2021-06-23

## 2021-02-06 RX ORDER — HEPARIN SODIUM 10000 [USP'U]/100ML
0-5000 INJECTION, SOLUTION INTRAVENOUS CONTINUOUS
Status: DISCONTINUED | OUTPATIENT
Start: 2021-02-07 | End: 2021-02-08

## 2021-02-06 RX ORDER — NICOTINE POLACRILEX 4 MG
15-30 LOZENGE BUCCAL
Status: DISCONTINUED | OUTPATIENT
Start: 2021-02-06 | End: 2021-02-06

## 2021-02-06 RX ORDER — ONDANSETRON 2 MG/ML
4 INJECTION INTRAMUSCULAR; INTRAVENOUS EVERY 6 HOURS PRN
Status: DISCONTINUED | OUTPATIENT
Start: 2021-02-06 | End: 2021-02-10 | Stop reason: HOSPADM

## 2021-02-06 RX ORDER — SODIUM CHLORIDE 9 MG/ML
INJECTION, SOLUTION INTRAVENOUS CONTINUOUS
Status: DISCONTINUED | OUTPATIENT
Start: 2021-02-06 | End: 2021-02-08

## 2021-02-06 RX ORDER — DEXTROSE MONOHYDRATE 25 G/50ML
25-50 INJECTION, SOLUTION INTRAVENOUS
Status: DISCONTINUED | OUTPATIENT
Start: 2021-02-06 | End: 2021-02-10 | Stop reason: HOSPADM

## 2021-02-06 RX ADMIN — ASPIRIN 81 MG CHEWABLE TABLET 324 MG: 81 TABLET CHEWABLE at 18:08

## 2021-02-06 RX ADMIN — IOPAMIDOL 62 ML: 755 INJECTION, SOLUTION INTRAVENOUS at 17:40

## 2021-02-06 RX ADMIN — SODIUM CHLORIDE: 9 INJECTION, SOLUTION INTRAVENOUS at 22:23

## 2021-02-06 RX ADMIN — SODIUM CHLORIDE 500 ML: 9 INJECTION, SOLUTION INTRAVENOUS at 18:20

## 2021-02-06 RX ADMIN — ENOXAPARIN SODIUM 60 MG: 60 INJECTION SUBCUTANEOUS at 18:23

## 2021-02-06 RX ADMIN — SODIUM CHLORIDE 84 ML: 9 INJECTION, SOLUTION INTRAVENOUS at 17:40

## 2021-02-06 ASSESSMENT — ENCOUNTER SYMPTOMS
COUGH: 0
NAUSEA: 0
FEVER: 0
SHORTNESS OF BREATH: 1
VOMITING: 0
ABDOMINAL PAIN: 0

## 2021-02-06 ASSESSMENT — MIFFLIN-ST. JEOR: SCORE: 1225.84

## 2021-02-06 NOTE — ED TRIAGE NOTES
Pt SOB started this afternoon, called clinic ([facility]) and was prompted to go to the ER. Arrived via private car. Upon arrival O2 sat on room air was 77%. Alert and orientated, ABCs intact.

## 2021-02-06 NOTE — ED PROVIDER NOTES
History   Chief Complaint:  Shortness of Breath     HPI   Lorna Guzman is a 65 year old female with history of hyperlipidemia, metastatic breast cancer, mitral valve prolapse, and mitral regurgitation who presents with sudden onset shortness of breath that began this afternoon, with reported hypoxia down to the 70s on arrival. She denies any associated fevers, chest pain, or cough. She is not on blood thinners and does not have a history of blood clots. She denies sick contacts    Review of Systems   Constitutional: Negative for fever.   Respiratory: Positive for shortness of breath. Negative for cough.    Cardiovascular: Negative for chest pain.   Gastrointestinal: Negative for abdominal pain, nausea and vomiting.   All other systems reviewed and are negative.      Allergies:  Lisinopril  Venlafaxine    Medications:  Carvedilol  Trastuzumab  Paclitaxel  Pertuzumab  Ribociclib    Past Medical History:    Cardiomyopathy  Mitral regurgitation  Mitral valve prolapse  Right breast cancer  Hyperlipidemia     Past Surgical History:    Right mastectomy  Breast lumpectomy      Family History:    Mother - diabetes, breast cancer, ovarian cancer  Father - hypertension  Sister - breast cancer     Social History:  Denies smoking  Denies alcohol    Physical Exam     Patient Vitals for the past 24 hrs:   BP Temp Temp src Pulse Resp SpO2 Weight   02/06/21 1840 -- -- -- 125 25 98 % --   02/06/21 1830 97/63 -- -- 126 -- 98 % --   02/06/21 1820 98/63 -- -- 128 26 98 % --   02/06/21 1815 96/70 -- -- 128 30 97 % --   02/06/21 1810 96/70 -- -- 128 -- 97 % --   02/06/21 1805 94/59 -- -- -- -- 97 % --   02/06/21 1802 -- -- -- -- -- 96 % --   02/06/21 1800 91/65 -- -- 126 24 97 % --   02/06/21 1750 93/64 -- -- 124 (!) 36 95 % --   02/06/21 1747 -- 97.4  F (36.3  C) Oral -- -- 96 % --   02/06/21 1745 93/64 -- -- 122 (!) 40 -- --   02/06/21 1743 -- -- -- -- -- -- 63.3 kg (139 lb 8.8 oz)   02/06/21 3810 -- -- -- -- -- 94 % --    02/06/21 1717 -- -- -- -- -- -- 63 kg (139 lb)   02/06/21 1715 129/67 -- -- 122 -- 95 % --   02/06/21 1710 129/67 -- -- -- 25 (!) 72 % --       Physical Exam  Nursing note and vitals reviewed.  Constitutional: Thin appearing.  Eyes: Conjunctiva normal.  Pupils are equal, round, and reactive to light.   ENT: Nose normal. Mucous membranes pink and moist.    Neck: Normal range of motion.  CVS: Sinus tachycardia.  Normal heart sounds.    Pulmonary: Lungs clear to auscultation bilaterally. No wheezes/rales/rhonchi.  GI: Abdomen soft. Nontender, nondistended. No rigidity or guarding.    MSK: No calf tenderness or swelling.  Neuro: Alert. Follows simple commands.  Skin: Skin is warm and dry. Generalized pallor  Psychiatric: Normal affect.       Emergency Department Course   ECG  ECG taken at 1715, ECG read at 1715  Sinus tachycardia with occasional premature ventricular complexes  Otherwise normal ECG  Rate 122 bpm. DC interval 134 ms. QRS duration 88 ms. QT/QTc 314/447 ms. P-R-T axes 71 76 48.     Imaging:  CT chest PE w contrast:  IMPRESSION:  1.  Moderate burden of acute bilateral pulmonary emboli. Findings discussed with Dr. Locke at 1820 hours.  2.  There is evidence for right heart strain with RV/LV ratio greater than 1.  3.  Consolidation of majority of left lower lobe unchanged.  4.  New airspace disease involving predominantly left upper lobe may relate to pneumonia or possible developing infarct.  Report per radiology     Laboratory:  Creatinine POCT (Collected 1721): creatinine 0.8, GFR estimate 72    VBG (Collected 1720): pH: 7.29 (L), PCO2: 52 (H), PO2: 12 (L) o/w WNL  VBG: pH: 7.38, PCO2: 44, PO2: 27, Bicarbonate: 26 o/w WNL    CBC: HGB 9.6 (L) o/w WNL (WBC 10.1, )  BMP: Na 132 (L), glucose 214 (H) o/w WNL (Creatinine 0.77)    Lactic acid (Collected 1720): 5.1 (H)   Lactic acid (Collected 1759): 1.8    Troponin (Collected 1720): 0.140 (H)  BNP: 84  INR: 1.07    Asymptomatic COVID-19 virus by PCR:  negative    Emergency Department Course:    Reviewed:  1708 I reviewed nursing notes, vitals, past medical history and care everywhere    Assessments:  1710 I obtained history and examined the patient as noted above.   1716 The patients oxygen saturations at 96% on non rebreather face mask.  1718 I called the patient's , but he did not respond.   1815 I rechecked the patient and explained findings.     Consults:   1820 I consulted with radiology.  1828 I consulted with Dr. Sigala of hospitalist service.   1900 I consulted with [facility] intensivist who feels the patient is safe to stay here. Given the patient's marked improvement, they agree TPA is not emergently needed    Interventions:  1808 Aspirin 324 mg PO  1820  mL IV Bolus  1823 Lovenox 60 mg subcutaneous    Disposition:  The patient was admitted to the hospital under the care of Dr. Sigala      Impression & Plan   CMS Diagnoses: The Lactic acid level is elevated due to hypoxia, at this time there is no sign of severe sepsis or septic shock.  PESI Score for estimating PE Associated 30-Day Mortality (calculator)  Background  Estimates the 30-day mortality risk associated with pulmonary embolism based on 11 criteria including age, gender, cancer history, CHF, COPD, heart rate >110, SBP <100, RR >30, Tm <96.8 F, O2 Sat <90, and altered mental status.   Data  65 year old  has Breast cancer (H); Cardiomyopathy, idiopathic (H); Malignant neoplasm of right female breast, unspecified estrogen receptor status, unspecified site of breast (H); Right breast cancer with T3 tumor, >5 cm in greatest dimension (H); MVP (mitral valve prolapse); Mitral regurgitation; Hyperlipidemia; ACP (advance care planning); and Health Care Home on their problem list.  Temp: 97.4  F (36.3  C)  BP: 94/59  Pulse: 126  Resp: 24  SpO2: 97 %  Criteria  Score 1/y: Age  Score 30: Cancer - active or past history  Score 20: Heart rate >110 bpm  Score 20: Oxygen Saturation  <90%  Interpretation  PESI Score: 133  Score >126: Class 5 - Very high 30-day mortality risk (10.0 to 24.5%)      Medical Decision Making:  Lorna Guzman is a 65 year old female presents to ED for evaluation of acute dyspnea, noted to be hypoxic on arrival.  Differential includes dysrhythmia, ACS, pericarditis, pleural effusion, pneumonia, pneumothorax, and pulmonary embolism.  Bedside US confirmed RV dilation, concerns for PE on arrival.  Patient SpO2 improved with nonrebreather.  CT demonstrates moderate acute bilateral PE with RV strain pulmonary emboli.  She has a PESI score of 133  Patient given dose of IV lovenox given history of malignancy after risks/benefits of anticoagulation reviewed with patient and her .  Her initial lactate was elevated though improved after oxygen support, likely driven by hypoxia.  Repeat VBG improving.  Mild troponin elevation.  I considered TPA given patient with labile BP on arrival though given her marked clinical improvement I do not feel this is emergently warranted.  [facility] intensivist in agreement that patient appears stable for management at [facility] ICU and to hold off on TPA at this time.  Accepted by hospitalist for admission.  She remains on 10L, though no indication for further advanced airway at this time.  No indication for pressor support.    Patient is a FULL CODE.    Critical Care Time for this patient, exclusive of procedures, is 35 minutes.     Covid-19  Lorna Guzman was evaluated during a global COVID-19 pandemic, which necessitated consideration that the patient might be at risk for infection with the SARS-CoV-2 virus that causes COVID-19.   Applicable protocols for evaluation were followed during the patient's care.   COVID-19 was considered as part of the patient's evaluation. The plan for testing is:  a test was obtained during this visit.    Diagnosis:    ICD-10-CM    1. Other acute pulmonary embolism, unspecified whether acute cor  pulmonale present (H)  I26.99 Asymptomatic SARS-CoV-2 COVID-19 Virus (Coronavirus) by PCR     Lactic acid whole blood     Blood gas venous and oxyhgb   2. Acute respiratory failure with hypoxia (H)  J96.01    3. Lactic acidosis  E87.2    4. NSTEMI (non-ST elevated myocardial infarction) (H)  I21.4        Scribe Disclosure:  I, Opal Wells, am serving as a scribe at 5:06 PM on 2/6/2021 to document services personally performed by Geri Locke DO based on my observations and the provider's statements to me.            Geri Locke DO  02/06/21 1918

## 2021-02-07 ENCOUNTER — APPOINTMENT (OUTPATIENT)
Dept: CARDIOLOGY | Facility: CLINIC | Age: 66
DRG: 175 | End: 2021-02-07
Attending: HOSPITALIST
Payer: MEDICARE

## 2021-02-07 LAB
GLUCOSE BLDC GLUCOMTR-MCNC: 105 MG/DL (ref 70–99)
GLUCOSE BLDC GLUCOMTR-MCNC: 115 MG/DL (ref 70–99)
GLUCOSE BLDC GLUCOMTR-MCNC: 119 MG/DL (ref 70–99)
PROCALCITONIN SERPL-MCNC: 0.4 NG/ML
TROPONIN I SERPL-MCNC: 0.45 UG/L (ref 0–0.04)
TROPONIN I SERPL-MCNC: 0.62 UG/L (ref 0–0.04)
UFH PPP CHRO-ACNC: 0.29 IU/ML
UFH PPP CHRO-ACNC: 0.33 IU/ML
UFH PPP CHRO-ACNC: 0.84 IU/ML

## 2021-02-07 PROCEDURE — 93308 TTE F-UP OR LMTD: CPT | Mod: 26 | Performed by: INTERNAL MEDICINE

## 2021-02-07 PROCEDURE — 200N000001 HC R&B ICU

## 2021-02-07 PROCEDURE — 36415 COLL VENOUS BLD VENIPUNCTURE: CPT | Performed by: INTERNAL MEDICINE

## 2021-02-07 PROCEDURE — 93325 DOPPLER ECHO COLOR FLOW MAPG: CPT

## 2021-02-07 PROCEDURE — 85520 HEPARIN ASSAY: CPT | Performed by: HOSPITALIST

## 2021-02-07 PROCEDURE — 999N001017 HC STATISTIC GLUCOSE BY METER IP

## 2021-02-07 PROCEDURE — 93321 DOPPLER ECHO F-UP/LMTD STD: CPT | Mod: 26 | Performed by: INTERNAL MEDICINE

## 2021-02-07 PROCEDURE — 93325 DOPPLER ECHO COLOR FLOW MAPG: CPT | Mod: 26 | Performed by: INTERNAL MEDICINE

## 2021-02-07 PROCEDURE — 99232 SBSQ HOSP IP/OBS MODERATE 35: CPT | Performed by: INTERNAL MEDICINE

## 2021-02-07 PROCEDURE — 255N000002 HC RX 255 OP 636: Performed by: HOSPITALIST

## 2021-02-07 PROCEDURE — 250N000013 HC RX MED GY IP 250 OP 250 PS 637: Performed by: INTERNAL MEDICINE

## 2021-02-07 PROCEDURE — 84484 ASSAY OF TROPONIN QUANT: CPT | Performed by: HOSPITALIST

## 2021-02-07 PROCEDURE — 36415 COLL VENOUS BLD VENIPUNCTURE: CPT | Performed by: HOSPITALIST

## 2021-02-07 PROCEDURE — 85520 HEPARIN ASSAY: CPT | Performed by: INTERNAL MEDICINE

## 2021-02-07 PROCEDURE — 258N000003 HC RX IP 258 OP 636: Performed by: HOSPITALIST

## 2021-02-07 PROCEDURE — 84145 PROCALCITONIN (PCT): CPT | Performed by: INTERNAL MEDICINE

## 2021-02-07 PROCEDURE — 250N000011 HC RX IP 250 OP 636: Performed by: HOSPITALIST

## 2021-02-07 RX ORDER — CARVEDILOL 6.25 MG/1
6.25 TABLET ORAL 2 TIMES DAILY WITH MEALS
Status: DISCONTINUED | OUTPATIENT
Start: 2021-02-07 | End: 2021-02-09

## 2021-02-07 RX ADMIN — CARVEDILOL 6.25 MG: 6.25 TABLET, FILM COATED ORAL at 18:07

## 2021-02-07 RX ADMIN — SODIUM CHLORIDE: 9 INJECTION, SOLUTION INTRAVENOUS at 23:57

## 2021-02-07 RX ADMIN — HEPARIN SODIUM 1100 UNITS/HR: 10000 INJECTION, SOLUTION INTRAVENOUS at 06:14

## 2021-02-07 RX ADMIN — HUMAN ALBUMIN MICROSPHERES AND PERFLUTREN 3 ML: 10; .22 INJECTION, SOLUTION INTRAVENOUS at 09:00

## 2021-02-07 RX ADMIN — CARVEDILOL 6.25 MG: 6.25 TABLET, FILM COATED ORAL at 10:24

## 2021-02-07 RX ADMIN — SODIUM CHLORIDE: 9 INJECTION, SOLUTION INTRAVENOUS at 11:10

## 2021-02-07 ASSESSMENT — ACTIVITIES OF DAILY LIVING (ADL)
TOILETING_ISSUES: NO
ADLS_ACUITY_SCORE: 18
DRESSING/BATHING_DIFFICULTY: NO
HEARING_DIFFICULTY_OR_DEAF: NO
CONCENTRATING,_REMEMBERING_OR_MAKING_DECISIONS_DIFFICULTY: NO
ADLS_ACUITY_SCORE: 17
WALKING_OR_CLIMBING_STAIRS: OTHER (SEE COMMENTS)
ADLS_ACUITY_SCORE: 17
WEAR_GLASSES_OR_BLIND: YES
ADLS_ACUITY_SCORE: 15
DOING_ERRANDS_INDEPENDENTLY_DIFFICULTY: NO
DIFFICULTY_EATING/SWALLOWING: NO
DIFFICULTY_COMMUNICATING: NO
WALKING_OR_CLIMBING_STAIRS_DIFFICULTY: YES
ADLS_ACUITY_SCORE: 18
ADLS_ACUITY_SCORE: 18

## 2021-02-07 ASSESSMENT — MIFFLIN-ST. JEOR: SCORE: 1237.38

## 2021-02-07 NOTE — PROGRESS NOTES
[facility]    Hospitalist Progress Note      Assessment & Plan   Lorna Guzman is a 65 year old female who was admitted on 2/6/2021.    Summary of Stay:   Lorna Guzman is a 65 year old female with history of metastatic breast cancer to liver, possibly lung base and pleural who is currently receiving chemotherapy, last infusion was Tuesday per patient Herceptin, who presents to hospital with SOB.      In the ED vitals notable for hypoxia requiring 10 L O2 to maintain saturations of 95%, blood pressures of 107/75, heart rates in the 120s, labs notable for sodium 132 lactic acid of 5.1 troponin of 0.140, venous blood gas pH 7.2 PCO2 52, hemoglobin 9.6 platelets of 260.  She had a CT angiogram which showed moderate burden PE bilateral, evidence of right heart strain, consolidation of the left lower lobe unchanged, new airspace disease left upper lobe relating to pneumonia or possible developing infarct.     Patient received a dose of Lovenox in the emergency room.  Admitted to the ICU.  In the morning she was started on IV heparin infusion.  Lower extremity ultrasound showed a distal DVT in the right peroneal vein.    Overnight, patient remained stable.  Her oxygen requirement has come down and now she is on 4 L of oxygen by nasal cannula saturating 98%.  Able to get up and go to the bathroom.  Gets short of breath but recovers quickly.  Denies any chest pain.  Vitals are stable.    Plan:    Addendum:  D/w Dr Carcamo: will check procalcitonin. Has GERA infilltrate on CT. If elevated treat with antibiotics.    ECHO shows RV dilatation and decreased systolic function. LVEF is 45-50% (lower than last Echo). Continue current care in ICU.    Acute bilateral pulmonary embolism.  Acute hypoxic respiratory failure.  -Continue IV heparin.  -Possibly transition to a DOAC later today.  -Continue to wean down the oxygen as tolerated.  -Vitals are otherwise stable.  She is tachycardic.  -Troponin is  coming down.  BNP was low.  -Echo is pending.  -Overall her condition is improving.  Continue ICU care.  -Hematology/oncology consultation.    Distal right DVT  -IV heparin.    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  Echo on 1/25/2021 showed ejection fraction of 50 to 55% which is stable.  -On carvedilol at baseline, 25 twice daily.  Resume carvedilol at a lower dose of 6.25 mg twice daily with aim of up titrating to home dose as the blood pressure allows.  -No evidence of volume overload.    Elevated troponin  -Demand ischemia due to PE.    Chronic anemia  -Monitor.    Metastatic breast cancer  -She follows up with Dr. Lua, [facility].     is in the room.    DVT Prophylaxis: IV heparin  Code Status: Full Code  Expected discharge: 2-3 days    Denver Pitts MD  [employer] Page (7am - 6pm, M-F)    Interval History   Patient was evaluated with nursing staff. Overnight issues discussed.    Review of systems:  No nausea or vomiting.  No abdominal pain.  No diarrhea.  No chest pain/palpitations.  No headache/visual disturbance/new weakness.    Feeling better.  Shortness of breath has improved.  Denies any chest pain.    -Data reviewed today: Labs and medications.    Physical Exam   Temp: 98  F (36.7  C) Temp src: Oral BP: 126/80 Pulse: 105   Resp: 21 SpO2: 98 % O2 Device: Nasal cannula Oxygen Delivery: 4 LPM  Vitals:    02/06/21 1743 02/06/21 2055 02/07/21 0630   Weight: 63.3 kg (139 lb 8.8 oz) 60.6 kg (133 lb 11.2 oz) 61.8 kg (136 lb 3.9 oz)     Vital Signs with Ranges  Temp:  [97.4  F (36.3  C)-98.2  F (36.8  C)] 98  F (36.7  C)  Pulse:  [101-128] 105  Resp:  [13-48] 21  BP: ()/(59-84) 126/80  SpO2:  [72 %-100 %] 98 %  I/O last 3 completed shifts:  In: 574.18 [I.V.:574.18]  Out: 350 [Urine:350]    Constitutional: Awake, alert, cooperative, no apparent distress  HEENT: Trachea midline, sclera is clear   Respiratory: No crackles. No wheezing. Equal breath sounds  bilaterally.  Cardiovascular: Regular rate and rhythm, normal S1 and S2, and no murmur noted  GI: Normal bowel sounds, soft, non-distended, non-tender  Skin/Integumen: No rashes, no cyanosis   Extremities: No pitting edema     Medications     heparin 1,100 Units/hr (02/07/21 0831)     - MEDICATION INSTRUCTIONS -       sodium chloride 75 mL/hr at 02/07/21 0600       carvedilol  6.25 mg Oral BID w/meals       Data   Recent Labs   Lab 02/07/21  0512 02/07/21  0115 02/06/21  2105 02/06/21  1720   WBC  --   --  9.5 10.1   HGB  --   --  8.9* 9.6*   MCV  --   --  100 104*   PLT  --   --  220 260   INR  --   --   --  1.07   NA  --   --   --  132*   POTASSIUM  --   --   --  4.6   CHLORIDE  --   --   --  100   CO2  --   --   --  24   BUN  --   --   --  10   CR  --   --   --  0.77   ANIONGAP  --   --   --  8   CAROLYN  --   --   --  8.8   GLC  --   --   --  214*   TROPI 0.447* 0.616* 0.589* 0.140*       Recent Results (from the past 24 hour(s))   CT Chest Pulmonary Embolism w Contrast    Narrative    EXAM: CT CHEST PULMONARY EMBOLISM W CONTRAST  LOCATION: [facility]  DATE/TIME: 2/6/2021 5:29 PM    INDICATION: PE suspected, high prob. Hypoxia with history of breast cancer.  COMPARISON: Diagnostic CT 10/22/2020  TECHNIQUE: CT chest pulmonary angiogram during arterial phase injection of IV contrast. Multiplanar reformats and MIP reconstructions were performed. Dose reduction techniques were used.   CONTRAST: 62mL Isovue-370    FINDINGS:  ANGIOGRAM CHEST: Exam is positive for bilateral acute pulmonary emboli. Moderate burden of emboli well depicted within right pulmonary arterial tree. On the left side there is some pre-existing anatomic distortion related to chronic disease but new   arterial filling defects are also seen on the left. Thoracic aorta is negative for dissection.   There does appear to be mild right heart strain with RV/LV ratio now greater than 1.    LUNGS AND PLEURA: Chronic atelectasis of left lower  lobe similar to previous exam. Patchy airspace consolidation posterior aspect of left upper lobe is new there is also faint airspace nodules present within lingula possibly related to superimposed   pneumonia or developing pulmonary infarct. Minimal peripheral groundglass opacities seen right costophrenic sulcus, again atypical pneumonia or developing infarct possible.    MEDIASTINUM/AXILLAE: Calcified left hilar region lymph nodes unchanged. No new adenopathy.    UPPER ABDOMEN: Splenic granulomata.    MUSCULOSKELETAL: Mild scoliosis.      Impression    IMPRESSION:  1.  Moderate burden of acute bilateral pulmonary emboli. Findings discussed with Dr. Locke at 1820 hours.  2.  There is evidence for right heart strain with RV/LV ratio greater than 1.  3.  Consolidation of majority of left lower lobe unchanged.  4.  New airspace disease involving predominantly left upper lobe may relate to pneumonia or possible developing infarct.   US Lower Extremity Venous Duplex Bilateral    Narrative    EXAM: US LOWER EXTREMITY VENOUS DUPLEX BILATERAL  LOCATION: [facility]  DATE/TIME: 2/6/2021 9:03 PM    INDICATION: Pulmonary emboli. Shortness of breath. Evaluate for DVT.  COMPARISON: CTA chest exam 02/06/2021  TECHNIQUE: Venous Duplex ultrasound of bilateral lower extremities with and without compression, augmentation and duplex. Color flow and spectral Doppler with waveform analysis performed.    FINDINGS: Exam includes the common femoral, femoral, popliteal veins as well as segmentally visualized deep calf veins and greater saphenous vein.     RIGHT: There is acute thrombus within the right peroneal vein in the upper calf. The rest of the deep venous system is patent. No superficial thrombophlebitis. No popliteal cyst.    LEFT: No deep vein thrombosis. No superficial thrombophlebitis. No popliteal cyst.      Impression    IMPRESSION:  1.  Acute DVT within the proximal peroneal vein.   2.  The left lower extremity  is negative for DVT.  3.  Results discussed with Dr. Jenn Sigala on 02/06/2021 at 10:00 PM.

## 2021-02-07 NOTE — PLAN OF CARE
ICU End of Shift Summary.  For vital signs and complete assessments, please see documentation flowsheets.     Pertinent assessments: A&O. VSS. Afebrile. Tele ST; -110s. Denies pain. SOB with activity, denies when at rest. LS CL on 5 LPM per NC. Up to bedside commode with assist x1 to void, slight SOB but unable to void with purewick in place; adequate UOP. No BM.   Major Shift Events: Right LE DVT per ultrasound. Heparin gtt started this AM per pharmacy.   Plan (Upcoming Events): Continue plan of care.   Discharge/Transfer Needs: Home when stable.     Bedside Shift Report Completed :   Bedside Safety Check Completed:

## 2021-02-07 NOTE — CONSULTS
Hematology / Oncology Consultation     Lorna Guzman MRN# [id number]   YOB: 1955 Age: 65 year old   Date of Admission: 2/6/2021     Reason for consult: Bilateral PEs           Assessment and Plan:   #1 Distal Right DVT  #2 Bilateral acute PEs  - Improving clinically on IV Heparin  - Related to hypercoagulability of malignancy    PLAN:  - OK to transition to a NOAC at any time  - No need for thrombophilia testing    #3 GERA lung infiltrates  - New.  Showed up on recent PET/CT (mildly hypermetabolic) as well, suggestive of an infection  - Concern for concurrent pneumonia.  No fever.  Respiratory symptoms mostly due to PEs    PLAN:  - Discussed with Dr. Pitts. Will check procalcitonin.  If elevated, may benefit from a course of antibiotics too.    #4 Metastatic breast cancer  - Responding to treatment per recent PET/CT    PLAN:  - Delay follow-up visit and next treatment from this coming Tuesday  - Dr. Lua will follow up with patient in the hospital tomorrow    Rigo Carcamo M.D.  [facility]  [phone]               Chief Complaint:   Bilateral PEs in the setting of breast cancer         History of Present Illness:   This is a very pleasant 65 year old lady with metastatic breast cancer under Dr. Lua's care.  She had a restaging PET/CT on 2/4/2021 which demonstrated a good response to treatment.  She called yesterday to report 1 day history of significant shortness of breath and dry cough.  Due to concern for PE, I advised her to go to the ER.    CT PE protocol indeed showed bilateral PEs with evidence of right heart strain.  DVT U/S showed a DVT in the right peroneal vein.   She was put on 10 L of O2 by NC and started IV Heparin.  Feeling much better today.  No bleeding complications.            Past Medical History:     Past Medical History:   Diagnosis Date     Cancer (H)     breast     Cardiomyopathy (H)      Mitral regurgitation 3/19/2020     MVP (mitral valve prolapse) 3/19/2020              Past Surgical History:     Past Surgical History:   Procedure Laterality Date     BREAST SURGERY      March 2006     MASTECTOMY SIMPLE Right 10/24/2019    Procedure: RIGHT MASTECTOMY;  Surgeon: Nino Castellano MD;  Location: [facility] OR              Social History:     Social History     Tobacco Use     Smoking status: Never Smoker     Smokeless tobacco: Never Used   Substance Use Topics     Alcohol use: No     Alcohol/week: 0.0 standard drinks             Family History:     Family History   Problem Relation Age of Onset     Diabetes Mother      Breast Cancer Mother      Ovarian Cancer Mother      Hypertension Father      Breast Cancer Sister                Medications:     Medications Prior to Admission   Medication Sig Dispense Refill Last Dose     carvedilol (COREG) 25 MG tablet Take 1 tablet (25 mg) by mouth 2 times daily (with meals) 180 tablet 3 2/6/2021 at x1     dexamethasone (DECADRON) 4 MG tablet Take 4 mg by mouth TAKE 5 TABLETS BY MOUTH AS DIRECTED. TAKE 5 TABLETS BY MOUTH 12HOURS AND 6 HOURS BEFORE PACLITAXEL INFUSION   Unknown at Unknown time     PACLITAXEL IV Inject into the vein every 7 days    Unknown at Unknown time     PERTUZUMAB IV Inject into the vein every 21 days    Unknown at Unknown time     trastuzumab (HERCEPTIN) 150 MG SOLR injection Inject into the vein every 21 days Chemo agent / HERCEPTIN   Unknown at Unknown time             Review of Systems:   The 10 point Review of Systems is negative other than noted in the HPI           Physical Exam:   Vitals were reviewed  Temp: 98  F (36.7  C) Temp src: Oral BP: 115/71 Pulse: 117   Resp: 28 SpO2: 93 % O2 Device: Nasal cannula Oxygen Delivery: 4 LPM  General: Awake alert and oriented  Skin: No rash  Heart: RRR. No murmurs  Lungs: Clear  Abd: S, NT, ND  Ext: Trace edema          Data:     Lab Results   Component Value Date    WBC 9.5 02/06/2021    HGB 8.9 (L) 02/06/2021    HCT 28.7 (L) 02/06/2021     02/06/2021     (L)  02/06/2021    POTASSIUM 4.6 02/06/2021    CHLORIDE 100 02/06/2021    CO2 24 02/06/2021    BUN 10 02/06/2021    CR 0.77 02/06/2021     (H) 02/06/2021    DD 0.4 06/03/2011    NTBNPI 84 02/06/2021    NTBNP 136 (H) 02/06/2017    TROPI 0.447 (HH) 02/07/2021    AST 15 06/15/2017    ALT 15 06/15/2017    ALKPHOS 28 06/15/2017    BILITOTAL 0.5 06/15/2017    INR 1.07 02/06/2021

## 2021-02-07 NOTE — ED NOTES
DATE:  2/6/2021   TIME OF RECEIPT FROM LAB:  5:59 PM  LAB TEST:  Troponin  LAB VALUE:  0.140  RESULTS GIVEN WITH READ-BACK TO (PROVIDER):  Chucky MARIE  TIME LAB VALUE REPORTED TO PROVIDER:   5:59 PM    MD aware, continuing plan.

## 2021-02-07 NOTE — PROGRESS NOTES
Dr. Sigala paged asking for clarification re: heparin and lovenox. Awaiting new orders.    2155: [facility] Dr. Lao called re: lower extremity ultrasound results. Dr. Sigala and Admitting Hospitalist paged to call Dr. Lao asap.    2215: Dr. Sigala here to see patient re: RLE DVT. Heparin gtt ordered and will start at 0600 d/t Lovenox given in ED.     0200: Tele RN updated Re: troponin trending up. She will notify tele MD. Patient asymptomatic and feels less SOB than on arrival. Awaiting orders if any. Will continue to monitor.

## 2021-02-07 NOTE — PHARMACY-ADMISSION MEDICATION HISTORY
Admission medication history interview status for this patient is complete. See [facility] admission navigator for allergy information, prior to admission medications and immunization status.     Medication history interview done via telephone during Covid-19 pandemic, indicate source(s): Patient  Medication history resources (including written lists, pill bottles, clinic record):None  Pharmacy: [id number] [place], [place] - [address]    Changes made to PTA medication list:  Added: Dexamethasone 4mg   Deleted: none  Changed: none    Actions taken by pharmacist (provider contacted, etc):None     Additional medication history information:None    Medication reconciliation/reorder completed by provider prior to medication history?  No     For patients on insulin therapy:   Do you use sliding scale insulin based on blood sugars?   What is your pre-meal insulin coverage?    Do you typically eat three meals a day?   How many times do you check your blood glucose per day?   How many episodes of hypoglycemia do you typically have per month?   Do you have a Continuous Glucose Monitor (CGM)?      Prior to Admission medications    Medication Sig Last Dose Taking? Auth Provider   carvedilol (COREG) 25 MG tablet Take 1 tablet (25 mg) by mouth 2 times daily (with meals) 2/6/2021 at x1 Yes Elias Spencer MD   dexamethasone (DECADRON) 4 MG tablet Take 4 mg by mouth TAKE 5 TABLETS BY MOUTH AS DIRECTED. TAKE 5 TABLETS BY MOUTH 12HOURS AND 6 HOURS BEFORE PACLITAXEL INFUSION Unknown at Unknown time  Unknown, Entered By History   PACLITAXEL IV Inject into the vein every 7 days  Unknown at Unknown time  Reported, Patient   PERTUZUMAB IV Inject into the vein every 21 days  Unknown at Unknown time  Reported, Patient   trastuzumab (HERCEPTIN) 150 MG SOLR injection Inject into the vein every 21 days Chemo agent / HERCEPTIN Unknown at Unknown time  Reported, Patient

## 2021-02-07 NOTE — H&P
[facility]    History and Physical  Hospitalist       Date of Admission:  2/6/2021    Assessment and Plan:      Lorna Guzman is a 65 year old female with history of metastatic breast cancer to liver, possibly lung base and pleural who is currently receiving chemotherapy, last infusion was Tuesday per patient Herceptin, who presents to hospital with SOB.     In the ED vitals notable for hypoxia requiring 10 L O2 to maintain saturations of 95%, blood pressures of 107/75, heart rates in the 120s, labs notable for sodium 132 lactic acid of 5.1 troponin of 0.140, venous blood gas pH 7.2 PCO2 52, hemoglobin 9.6 platelets of 260.  She had a CT angiogram which showed moderate burden PE bilateral, evidence of right heart strain, consolidation of the left lower lobe unchanged, new airspace disease left upper lobe relating to pneumonia or possible developing infarct.     She was given Lovenox in the ED and admitted for further management into the ICU    Acute hypoxic respiratory failure.  Etiology most likely acute pulmonary embolism.  -switched to heparin in case she needs procedures, etc. telemetry monitoring clinically seems to be improving, does not need systemic thrombolytics, monitor closely in the ICU    Acute pulmonary embolism  - treatment with heparin, as above    Acute DVT Right sided  - will hold off IVC filter as lower extremity DVT is small    Metastatic breast cancer   -diagnosed in March 2006, unfortunately re diagnosed 7/2013 with invasive right ductal carcinoma with metastatic disease to the liver and lymph nodes, spring 2019 growing right breast mass, she was restarted on chemotherapy, with right mastectomy 10/2019, and radiation in March 2020  -Currently on chemotherapy as above.  Recently had an echocardiogram which showed normal EF no evidence of cardiomyopathy.     Chemotherapy induced cardiomyopathy  -Currently on Coreg with hold as her blood pressures are  borderline    Elevated troponin  -Most likely demand ischemia type II the setting of her acute PE, continue to trend cardiac enzymes continue telemetry    Lactic acidosis  -Most likely secondary to above in the setting of hypoxia, improved monitor    Normocytic anemia  -Monitor most likely related to her underlying malignancy or chemotherapy    Hyperglycemia  -Most likely steroid-induced, insulin sliding scale while in the hospital  ---------     # Code status: Full  # Anticipated discharge date and Disposition: in 2-3 days  # DVT: Lovenox, switching to Heparin  # IVF: normal saline at 75 ml/hour                     Jenn Sigala MD  [facility] (7am - 6pm, M-F)          Primary Care Physician   Max Moreno    Chief Complaint   Shortness of breath    History is obtained from the patient    History of Present Illness   Lornase KERI Guzman is a 65 year old female who presents with shortness of breath.  Patient has been having chronic dyspnea on exertion however over the evening it became significantly worse and inability go walk around.  She is been having coughs as well chronic unchanged.  Denies any chest pain dizziness lightheadedness.  Denies any nausea vomiting does have overall poor p.o. intake and weakness.  She recently received her chemotherapy last week, seems to be tolerating it okay.  Only side effects associated with her chemotherapy is neuropathy, occasional nausea vomiting weakness. Otherwise no other complaints.    Past Medical History    I have reviewed this patient's medical history and updated it with pertinent information if needed.   Past Medical History:   Diagnosis Date     Cancer (H)     breast     Cardiomyopathy (H)      Mitral regurgitation 3/19/2020     MVP (mitral valve prolapse) 3/19/2020       Past Surgical History   Past surgical history reviewed with no previous surgeries identified.    Prior to Admission Medications   Prior to Admission Medications   Prescriptions Last Dose  Informant Patient Reported? Taking?   PACLITAXEL IV   Yes No   PERTUZUMAB IV   Yes No   carvedilol (COREG) 25 MG tablet   No No   Sig: Take 1 tablet (25 mg) by mouth 2 times daily (with meals)   trastuzumab (HERCEPTIN) 150 MG SOLR injection   Yes No   Sig: Inject into the vein every 21 days Chemo agent / HERCEPTIN      Facility-Administered Medications: None     Allergies   Allergies   Allergen Reactions     Lisinopril Hives and Swelling     Venlafaxine Hives and Swelling     Possibly allergy       Social History   I have reviewed this patient's social history and updated it with pertinent information if needed. Lorna Guzman  reports that she has never smoked. She has never used smokeless tobacco. She reports that she does not drink alcohol or use drugs.    Family History   Family history reviewed with patient and is noncontributory.    ROS  12 point review system discussed with patient negative as per HPI    Physical Exam   Temp: 97.4  F (36.3  C) Temp src: Oral BP: 112/80 Pulse: 123   Resp: 22 SpO2: 98 % O2 Device: Oxymask Oxygen Delivery: 10 LPM  Vital Signs with Ranges  Temp:  [97.4  F (36.3  C)] 97.4  F (36.3  C)  Pulse:  [122-128] 123  Resp:  [21-42] 22  BP: ()/(59-80) 112/80  SpO2:  [72 %-99 %] 98 %  139 lbs 8.82 oz    General: Pt in NAD, normal appearance  HEENT: PERRLA, EOMI, normocephalic / atraumatic no cervical LAD, no bruit, no pallor, WNL oropharynx, neck supple  Cardiac: +S1, S2, tacycardic, no MRG, no edema  Lungs: decreased breath sounds at bases, normal breathing without accessory muscle usage, no wheezing, rhonchi or crackles  GI: soft NT/ND +bowel sounds all quadrants,  Psych: normal mood and affect, A&Ox3  Neurological: A&O x3, non focal  Skin: warm, dry, normal turgor, no rash    Data   Data reviewed today:  I personally reviewed the EKG tracing showing j and the chest x-ray image(s) showing f.  Recent Labs   Lab 02/06/21  1720   WBC 10.1   HGB 9.6*   *      INR 1.07    *   POTASSIUM 4.6   CHLORIDE 100   CO2 24   BUN 10   CR 0.77   ANIONGAP 8   CAROLYN 8.8   *   TROPI 0.140*       Recent Results (from the past 24 hour(s))   CT Chest Pulmonary Embolism w Contrast    Narrative    EXAM: CT CHEST PULMONARY EMBOLISM W CONTRAST  LOCATION: [facility]  DATE/TIME: 2/6/2021 5:29 PM    INDICATION: PE suspected, high prob. Hypoxia with history of breast cancer.  COMPARISON: Diagnostic CT 10/22/2020  TECHNIQUE: CT chest pulmonary angiogram during arterial phase injection of IV contrast. Multiplanar reformats and MIP reconstructions were performed. Dose reduction techniques were used.   CONTRAST: 62mL Isovue-370    FINDINGS:  ANGIOGRAM CHEST: Exam is positive for bilateral acute pulmonary emboli. Moderate burden of emboli well depicted within right pulmonary arterial tree. On the left side there is some pre-existing anatomic distortion related to chronic disease but new   arterial filling defects are also seen on the left. Thoracic aorta is negative for dissection.   There does appear to be mild right heart strain with RV/LV ratio now greater than 1.    LUNGS AND PLEURA: Chronic atelectasis of left lower lobe similar to previous exam. Patchy airspace consolidation posterior aspect of left upper lobe is new there is also faint airspace nodules present within lingula possibly related to superimposed   pneumonia or developing pulmonary infarct. Minimal peripheral groundglass opacities seen right costophrenic sulcus, again atypical pneumonia or developing infarct possible.    MEDIASTINUM/AXILLAE: Calcified left hilar region lymph nodes unchanged. No new adenopathy.    UPPER ABDOMEN: Splenic granulomata.    MUSCULOSKELETAL: Mild scoliosis.      Impression    IMPRESSION:  1.  Moderate burden of acute bilateral pulmonary emboli. Findings discussed with Dr. Locke at 1820 hours.  2.  There is evidence for right heart strain with RV/LV ratio greater than 1.  3.  Consolidation  of majority of left lower lobe unchanged.  4.  New airspace disease involving predominantly left upper lobe may relate to pneumonia or possible developing infarct.

## 2021-02-07 NOTE — PROGRESS NOTES
Discussed case with ED MD and admitting hospitalist (Zakiya) twice.     Patient has bilateral PE but improved respiratory status and HR and normalization of lactate in ED  and normal BNP with only slightly elevated trop.  Calf DVT later found.      My recommendation is full dose enoxaparin and reserve systemic lytics for deterioration.  No contraindication to systemic lytics identified. No IVC filter as does not have a proximal DVT   Can stay at [facility] for now--lytics can be given there if needed.

## 2021-02-08 LAB
GLUCOSE BLDC GLUCOMTR-MCNC: 101 MG/DL (ref 70–99)
INTERPRETATION ECG - MUSE: NORMAL
UFH PPP CHRO-ACNC: 0.31 IU/ML

## 2021-02-08 PROCEDURE — 999N001017 HC STATISTIC GLUCOSE BY METER IP

## 2021-02-08 PROCEDURE — 85520 HEPARIN ASSAY: CPT | Performed by: INTERNAL MEDICINE

## 2021-02-08 PROCEDURE — 99232 SBSQ HOSP IP/OBS MODERATE 35: CPT | Performed by: INTERNAL MEDICINE

## 2021-02-08 PROCEDURE — 120N000004 HC R&B MS OVERFLOW

## 2021-02-08 PROCEDURE — 250N000011 HC RX IP 250 OP 636: Performed by: HOSPITALIST

## 2021-02-08 PROCEDURE — 36415 COLL VENOUS BLD VENIPUNCTURE: CPT | Performed by: INTERNAL MEDICINE

## 2021-02-08 PROCEDURE — 250N000013 HC RX MED GY IP 250 OP 250 PS 637: Performed by: INTERNAL MEDICINE

## 2021-02-08 RX ORDER — LEVOFLOXACIN 500 MG/1
500 TABLET, FILM COATED ORAL DAILY
Status: DISCONTINUED | OUTPATIENT
Start: 2021-02-08 | End: 2021-02-10 | Stop reason: HOSPADM

## 2021-02-08 RX ADMIN — RIVAROXABAN 15 MG: 15 TABLET, FILM COATED ORAL at 17:40

## 2021-02-08 RX ADMIN — HEPARIN SODIUM 1000 UNITS/HR: 10000 INJECTION, SOLUTION INTRAVENOUS at 02:03

## 2021-02-08 RX ADMIN — CARVEDILOL 6.25 MG: 6.25 TABLET, FILM COATED ORAL at 17:40

## 2021-02-08 RX ADMIN — RIVAROXABAN 15 MG: 15 TABLET, FILM COATED ORAL at 09:54

## 2021-02-08 RX ADMIN — LEVOFLOXACIN 500 MG: 500 TABLET, FILM COATED ORAL at 09:53

## 2021-02-08 RX ADMIN — CARVEDILOL 6.25 MG: 6.25 TABLET, FILM COATED ORAL at 09:24

## 2021-02-08 ASSESSMENT — ACTIVITIES OF DAILY LIVING (ADL)
ADLS_ACUITY_SCORE: 17
ADLS_ACUITY_SCORE: 18
ADLS_ACUITY_SCORE: 18
FALL_HISTORY_WITHIN_LAST_SIX_MONTHS: NO
ADLS_ACUITY_SCORE: 18
ADLS_ACUITY_SCORE: 21
ADLS_ACUITY_SCORE: 21

## 2021-02-08 NOTE — PLAN OF CARE
ICU End of Shift Summary.  For vital signs and complete assessments, please see documentation flowsheets.     Pertinent assessments: A&O. VSS. Afebrile. Tele ST; -110s. Denies pain. LS CL, dim in bases on 3 LPM per NC. SOB with exertion and transfers. Up to bedside commode with assistance. Slept well.   Major Shift Events: Heparin gtt infusing.  Plan (Upcoming Events): Continue plan of care.   Discharge/Transfer Needs: TBD    Bedside Shift Report Completed :   Bedside Safety Check Completed:

## 2021-02-08 NOTE — PROGRESS NOTES
CLINICAL NUTRITION SERVICES  -  ASSESSMENT NOTE    Recommendations Ordered by Registered Dietitian (RD):   Continue diet as ordered   Ordered high protein ice cream (vanilla) BID    Malnutrition:   % Weight Loss:  Weight loss does not meet criteria for malnutrition - 11% in 7 months   % Intake:  <75% for >/= 3 months (non-severe malnutrition)  Subcutaneous Fat Loss:  Orbital region mild-moderate depletion and Upper arm region mild depletion  Muscle Loss:  Temporal region mild-moderate depletion, Clavicle bone region mild depletion, Acromion bone region mild depletion, Scapular bone region mild depletion, Anterior thigh region mild depletion and Posterior calf region mild depletion  Fluid Retention:  None noted    Malnutrition Diagnosis: Non-Severe malnutrition  In Context of:  Acute illness or injury  Chronic illness or disease     REASON FOR ASSESSMENT  Lorna Guzman is a 65 year old female seen by Registered Dietitian for Admission Nutrition Risk Screen for positive    NUTRITION HISTORY  - Information obtained from patient and chart   - Patient admitted for acute hypoxic respiratory failure.  Etiology most likely acute pulmonary embolism  - History of metastatic breast cancer to liver, possibly lung base and pleural who is currently receiving chemotherapy   - Typical diet at home: regular diet   - Typical food/fluid intake PTA: pt states that since she started chemotherapy in November she has had a decline in her PO intake. For the past 3 months has been consuming smaller portions at her meals. Mentioning that she has no appetite or desire to eat. Prefers softer foods to minimize gagging. Typically consumes 3 meals per day.   - Supplements: has tried ensure in the past, not consistently consuming this however   - Chewing/swallowing difficulty: none   - Food allergies: NKFA    CURRENT NUTRITION ORDERS  Diet Order:     Regular Diet     Current Intake/Tolerance:  Upon review of the flowsheets, pt is consuming  "25-75% of meals ordered. Ordered 2 meals since admission     NUTRITION FOCUSED PHYSICAL ASSESSMENT FOR DIAGNOSING MALNUTRITION)  Yes         Observed:    Muscle wasting (refer to documentation in Malnutrition section) and Subcutaneous fat loss (refer to documentation in Malnutrition section)    Obtained from Chart/Interdisciplinary Team:  - hem/onc is following     ANTHROPOMETRICS  Height: 5' 9\"  Weight: 61.8 kg ( 136 lbs 3.91 oz)   Body mass index is 20.12 kg/m .  Weight Status:  Normal BMI  Weight History: weight is down 7.6 kg over the past 7 months. This equates to a weight loss of 11%.   Wt Readings from Last 10 Encounters:   02/07/21 61.8 kg (136 lb 3.9 oz)   01/22/21 62.2 kg (137 lb 3.2 oz)   07/02/20 69.4 kg (153 lb)   04/07/20 67.7 kg (149 lb 3.2 oz)   12/23/19 72.5 kg (159 lb 12.8 oz)   11/07/19 72.6 kg (160 lb)   10/24/19 72.6 kg (160 lb)   10/10/19 71.2 kg (157 lb)   10/07/19 71.2 kg (157 lb)   07/01/19 71.6 kg (157 lb 12.8 oz)     LABS  Labs reviewed    Electrolytes  Potassium (mmol/L)   Date Value   02/06/2021 4.6   06/15/2017 4.7   04/14/2017 4.7    Blood Glucose  Glucose (mg/dL)   Date Value   02/06/2021 214 (H)   01/22/2021 141 (A)   06/15/2017 100   04/14/2017 98   04/13/2017 98     Hemoglobin A1C (%)   Date Value   02/06/2021 5.5   01/22/2021 5.9    Inflammatory Markers  WBC   Date Value   02/06/2021 9.5 10e9/L   02/06/2021 10.1 10e9/L   01/22/2021 7.7 10*9/L     Albumin (g/dL)   Date Value   06/15/2017 4.1   04/14/2017 4.2   04/14/2017 4.2      Sodium (mmol/L)   Date Value   02/06/2021 132 (L)   06/15/2017 143   04/14/2017 141    Renal  Urea Nitrogen (mg/dL)   Date Value   02/06/2021 10   06/15/2017 12   04/14/2017 16   04/14/2017 16     Creatinine (mg/dL)   Date Value   02/06/2021 0.77   06/15/2017 0.88   04/14/2017 0.81     Additional  No results found for: TRIG, URINEKETONE     MEDICATIONS  Medications reviewed    carvedilol  6.25 mg Oral BID w/meals     levofloxacin  500 mg Oral Daily        " heparin 1,000 Units/hr (02/08/21 0600)     - MEDICATION INSTRUCTIONS -        glucose **OR** dextrose **OR** glucagon, ondansetron **OR** ondansetron, - MEDICATION INSTRUCTIONS -, prochlorperazine **OR** prochlorperazine **OR** prochlorperazine     ASSESSED NUTRITION NEEDS PER APPROVED PRACTICE GUIDELINES:    Dosing Weight 61.8 kg   Estimated Energy Needs: 5588-9960 kcals (25-30 Kcal/Kg)  Justification: maintenance  Estimated Protein Needs: 74-93 grams protein (1.2-1.5 g pro/Kg)  Justification: preservation of lean body mass  Estimated Fluid Needs: per MD     MALNUTRITION:  % Weight Loss:  Weight loss does not meet criteria for malnutrition - 11% in 7 months   % Intake:  <75% for >/= 3 months (non-severe malnutrition)  Subcutaneous Fat Loss:  Orbital region mild-moderate depletion and Upper arm region mild depletion  Muscle Loss:  Temporal region mild-moderate depletion, Clavicle bone region mild depletion, Acromion bone region mild depletion, Scapular bone region mild depletion, Anterior thigh region mild depletion and Posterior calf region mild depletion  Fluid Retention:  None noted    Malnutrition Diagnosis: Non-Severe malnutrition  In Context of:  Acute illness or injury  Chronic illness or disease    NUTRITION DIAGNOSIS:  Inadequate oral intake related to poor appetite in the setting of chemotherapy as evidenced by pt likely consuming <75% of nutritional needs over the past 3 months     NUTRITION INTERVENTIONS  Recommendations / Nutrition Prescription  Continue diet as ordered   Ordered high protein ice cream (vanilla) BID     Implementation  Nutrition education: Provided education on high protein/high calorie food options and the benefits for weight gain and preservation of lean body mass. Gave handouts on the high calorie/high protein recipes and nutrition therapy via the nutrition care manual   Medical Food Supplement: as above   Collaboration and Referral of Nutrition care: discussed pt during  interdisciplinary rounds this morning     Nutrition Goals  Pt to consume >/=75% of meals ordered TID     MONITORING AND EVALUATION:  Progress towards goals will be monitored and evaluated per protocol and Practice Guidelines    Freda Barclay RD, LD  Clinical Dietitian

## 2021-02-08 NOTE — PHARMACY-RX INSURANCE COVERAGE
Anticoagulation coverage check.  Patient has [employer] through an employer.    Xarelto/Eliquis:  $20/mo. Upon receipt of RX [facility] can provide copay savings card to reduce this to $10/mo.    Enoxaparin 60mg x 10 syringes: $8.    Jeradtoven (warfarin): $5/mo      -KYLIE Busby, Pharmacy Technician/Liaison, Discharge [facility]acy [phone]

## 2021-02-08 NOTE — PLAN OF CARE
"ICU End of Shift Summary.  For vital signs and complete assessments, please see documentation flowsheets.     Pertinent assessments: Pt denies pain . A+OX4. Dyspnea with activity. Up to chair and commode with 1 assist. On heparin gtt.   Major Shift Events: Pt is ate 25%-75% of meals. Reports breathing is \"better\" today.  Plan (Upcoming Events): encourage activity. Heparin 10 due to be checked at 19:45.   Discharge/Transfer Needs: Pt may transfer out of ICU by tomorrow.    Bedside Shift Report Completed :   Bedside Safety Check Completed:      "

## 2021-02-08 NOTE — PLAN OF CARE
End of Shift Summary.  For vital signs and complete assessments, please see documentation flowsheets.     Pertinent assessments: A/O X 4. Up with one assist. VSS. RA. Voiding WNL. No complaints. PIV X 2.  Major Shift Events: stopped heparin, started Xarelto. Wean O2 to RA. Heme/onc consult. Will hold off on further ca treatment until PEs resolved.  Plan (Upcoming Events): trx  Discharge/Transfer Needs: home tomorrow?    Bedside Shift Report Completed   Bedside Safety Check Completed

## 2021-02-08 NOTE — PLAN OF CARE
"ICU End of Shift Summary.  For vital signs and complete assessments, please see documentation flowsheets.     Pertinent assessments: Pt up to chair and bedside commode this shift with assist of 1. Mild dyspnea with activity. 02 weaned to 4 lpm/NC. Pt sates \"feeling better\". Oncology consulted (Pt follows with [facility]). Echo completed today. Tolerated small amount of regular diet; pt states this is her \"normal\" intake. Heparin drip infusing. Trops trending down this am, no further labs ordered.   Major Shift Events: Pt states \"feeling better\" weaned to 4 lpm/NC. Up in chair. Heparin drip.   Plan (Upcoming Events): Possible transfer out of ICU  Discharge/Transfer Needs: TBD.     Bedside Shift Report Completed :   Bedside Safety Check Completed:      "

## 2021-02-08 NOTE — PROGRESS NOTES
Oncology/Hematology Follow Up Note:    Assessment and Plan:  Lorna Guzman is a 65 year old female patient admitted 2/6 with new acute pulmonary embolism.    1.Metastatic breast cancer with metastasis to lung- now ER negative, HER-2 positive has been on chemotherapy  -Most recently has been on Pertuzumab, trastuzumab, paclitaxel last dose given on 2/2/2021  -Most recent PET CT scan showing response to treatment    PLAN: Hold chemotherapy while inpatient.  -We will discuss with cardiology also regarding resuming her HER-2 directed therapy based on current EF and new RV dysfunction.  -Follows with , will arrange OP Follow-up.    2.New acute pulmonary embolism along with acute DVT right-sided  -Hypercoagulability of malignancy  -No hypercoagulable work-up indicated  -Lifelong anticoagulation    PLAN  - ok for NOAC  -Working on transitioning and weaning down oxygen based on status.    3.Noted drop in EF at 45 to 50% along with RV severely dilated with moderately decreased RV function    PLAN:    -Patient will be arranging outpatient follow-up to discuss with cardiology also regarding timing of resuming biological therapy and further echocardiogram    4.Concern for possible pneumonia  -Has previously had left lower lobe collapse okay to cover Levaquin for now    5. CODE: FULL    -Patient has metastatic disease but has excellent performance status and excellent quality of life up until recently with more fatigue and toxicity of chemotherapy.      Subjective:    Ongoing shortness of breath which came on very suddenly specifically on Saturday.  She has had ongoing coughing but the shortness of breath was quite sudden.  She does endorse being relatively more sedentary the past 2 weeks due to fatigue of chemotherapy.  She denies swelling in her calf    Scheduled Medications:  Reviewed active medications    Labs:  CBC RESULTS:   Recent Labs   Lab Test 02/06/21  2105 02/06/21  1720 01/22/21  1427   WBC 9.5 10.1 7.7  "  HGB 8.9* 9.6* 9.8*   HCT 28.7* 31.9* 29.9*    104* 99.7    260 293       CMP  Recent Labs   Lab 02/06/21  1721 02/06/21  1720   NA  --  132*   POTASSIUM  --  4.6   CHLORIDE  --  100   CO2  --  24   ANIONGAP  --  8   GLC  --  214*   BUN  --  10   CR  --  0.77   GFRESTIMATED 72 81   GFRESTBLACK 88 >90   CAROLYN  --  8.8       INR  Recent Labs   Lab 02/06/21  1720   INR 1.07       Objective/Physical Exam:  Blood pressure 121/70, pulse 106, temperature 97.7  F (36.5  C), temperature source Oral, resp. rate 24, height 1.753 m (5' 9\"), weight 61.8 kg (136 lb 3.9 oz), SpO2 99 %, not currently breastfeeding.  General appearance:alert, oriented, no acute distress  HEENT:sclera anicteric, no pallor, no thrush or mucositis.  Lungs: chest is clear to auscultation, no rales or rhonchi appreciated.  Abdomen: soft, NT, ND , BS normal  Ext: no edema or rashes    Zain Lua MD  [facility]  2/8/2021 12:56 PM    Time: 35 minutes with patient , 30 minutes in counseling and coordination of care  D/w Dr. Pitts All imaging personally reviewed.  "

## 2021-02-08 NOTE — PROGRESS NOTES
[facility]    Hospitalist Progress Note      Assessment & Plan   Lorna Guzman is a 65 year old female who was admitted on 2/6/2021.    Summary of Stay:   Lorna Guzman is a 65 year old female with history of metastatic breast cancer to liver, possibly lung base and pleural who is currently receiving chemotherapy, last infusion was Tuesday per patient Herceptin, who presents to hospital with SOB.      In the ED vitals notable for hypoxia requiring 10 L O2 to maintain saturations of 95%, blood pressures of 107/75, heart rates in the 120s, labs notable for sodium 132 lactic acid of 5.1 troponin of 0.140, venous blood gas pH 7.2 PCO2 52, hemoglobin 9.6 platelets of 260.  She had a CT angiogram which showed moderate burden PE bilateral, evidence of right heart strain, consolidation of the left lower lobe unchanged, new airspace disease left upper lobe relating to pneumonia or possible developing infarct.      Patient received a dose of Lovenox in the emergency room.  Admitted to the ICU.  In the morning she was started on IV heparin infusion.  Lower extremity ultrasound showed a distal DVT in the right peroneal vein.     Overnight, patient remained stable.  Her oxygen requirement has come down and now she is on 4 L of oxygen by nasal cannula saturating 98%.  Able to get up and go to the bathroom.  Gets short of breath but recovers quickly.  Denies any chest pain.  Vitals are stable.     Plan:     Acute bilateral pulmonary embolism.  Acute hypoxic respiratory failure. Right heart strain.  -O IV heparin. transition to a DOAC later today. Pharmacy liaison consult to check coverage  -Continue to wean down the oxygen as tolerated.  -Overall her condition is improving.  Continue ICU care.  -Hematology/oncology consultation appreciated  - ECHO shows RV dilatation and decreased systolic function. LVEF is 45-50% (lower than last Echo).    GERA infiltrate on CT  -d/w Dr Carcamo yesterday  -while it  was seen on PET CT earlier last week, the patient was not particularly symptomatic until; she developed SoB on Saturday (which turned out out be PE)  -But the procalcitonin is indeterminate, so will treat with 7 days of levofloxacin     Distal right DVT  -IV heparin.     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    - ECHO shows RV dilatation and decreased systolic function. LVEF is 45-50%. (Echo on 1/25/2021 showed ejection fraction of 50 to 55%)  -On carvedilol at baseline, 25 twice daily.  Resumed carvedilol at a lower dose of 6.25 mg twice daily with aim of up titrating to home dose as the blood pressure allows.  -No evidence of volume overload.     Elevated troponin  -Demand ischemia due to PE.     Chronic anemia  -Monitor.     Metastatic breast cancer  -She follows up with Dr. Lua, [facility].    PT henri      is in the room.     Transfer out of ICU    DVT Prophylaxis: IV heparin  Code Status: Full Code  Expected discharge: 1-2 days    Denver Pitts MD  [facility] (7am - 6pm, M-F)    Interval History   Patient was evaluated with nursing staff. Overnight issues discussed.    Review of systems:  No nausea or vomiting.  No abdominal pain.  No diarrhea.  No chest pain/palpitations.  No headache/visual disturbance/new weakness.    Shortness of breath is improving.  Requiring 3 L of oxygen by nasal cannula.  Has a chronic cough.    -Data reviewed today: Labs and medications.    Physical Exam   Temp: 97.7  F (36.5  C) Temp src: Oral BP: 121/70 Pulse: 101   Resp: 29 SpO2: 99 % O2 Device: Nasal cannula Oxygen Delivery: 3 LPM  Vitals:    02/06/21 1743 02/06/21 2055 02/07/21 0630   Weight: 63.3 kg (139 lb 8.8 oz) 60.6 kg (133 lb 11.2 oz) 61.8 kg (136 lb 3.9 oz)     Vital Signs with Ranges  Temp:  [97.7  F (36.5  C)-98.5  F (36.9  C)] 97.7  F (36.5  C)  Pulse:  [] 101  Resp:  [13-37] 29  BP: (100-134)/(62-80) 121/70  SpO2:  [93 %-100 %] 99 %  I/O last 3 completed  shifts:  In: 2619.03 [P.O.:590; I.V.:.]  Out: 725 [Urine:725]    Constitutional: Awake, alert, cooperative, no apparent distress  HEENT: Trachea midline, sclera is clear   Respiratory: No crackles. No wheezing. Equal breath sounds bilaterally.  Cardiovascular: Regular rate and rhythm, normal S1 and S2, and no murmur noted  GI: Normal bowel sounds, soft, non-distended, non-tender  Skin/Integumen: No rashes, no cyanosis  Psych: appropriate affect, no agitation   Extremities: No pitting edema     Medications     heparin 1,000 Units/hr (21 0600)     - MEDICATION INSTRUCTIONS -         carvedilol  6.25 mg Oral BID w/meals       Data   Recent Labs   Lab 21  0512 21  0115 21  2105 21  1720   WBC  --   --  9.5 10.1   HGB  --   --  8.9* 9.6*   MCV  --   --  100 104*   PLT  --   --  220 260   INR  --   --   --  1.07   NA  --   --   --  132*   POTASSIUM  --   --   --  4.6   CHLORIDE  --   --   --  100   CO2  --   --   --  24   BUN  --   --   --  10   CR  --   --   --  0.77   ANIONGAP  --   --   --  8   CAROLYN  --   --   --  8.8   GLC  --   --   --  214*   TROPI 0.447* 0.616* 0.589* 0.140*       Recent Results (from the past 24 hour(s))   Echo Limited    Narrative    [id number]  [id number]  [id number]  [id number]^LILLIAN^RAZA^LAZ           [facility]  Echocardiography Laboratory  [address] [place] [address]        Name: KIMMIE PRICE  MRN: [id number]  : 1955  Study Date: 2021 08:43 AM  Age: 65 yrs  Gender: Female  Patient Location: [facility]  Reason For Study: Pulmonary Emboli  Ordering Physician: RAZA SNYDER  Referring Physician: ANGEL MCDONNELL  Performed By: Beth Aguilar     BSA: 1.8 m2  Height: 69 in  Weight: 139 lb  BP: 109/73 mmHg  _____________________________________________________________________________  __        Procedure  Limited Portable Echo Adult. [employer]ison (NDC #[id number]) given  intravenously.  _____________________________________________________________________________  __        Interpretation Summary     The right ventricle is severely dilated.  Moderately decreased right ventricular systolic function  Left ventricular systolic function is mildly reduced.  The visual ejection fraction is estimated at 45-50%.  Dilation of the inferior vena cava is present with abnormal respiratory  variation in diameter.  _____________________________________________________________________________  __        Left Ventricle  The left ventricle is normal in size. There is normal left ventricular wall  thickness. Left ventricular systolic function is mildly reduced. The visual  ejection fraction is estimated at 45-50%. Left ventricular diastolic function  is indeterminate. There is mild global hypokinesia of the left ventricle.     Right Ventricle  The right ventricle is severely dilated. Moderately decreased right  ventricular systolic function.     Atria  Normal left atrial size. The right atrium is mildly dilated.     Mitral Valve  There is mild to moderate (1-2+) mitral regurgitation. The mitral regurgitant  jet is posteriorly directed, which is consistent with anterior leaflet  pathology.        Tricuspid Valve  Normal tricuspid valve. There is mild (1+) tricuspid regurgitation. The right  ventricular systolic pressure is approximated at 24mmHg plus the right atrial  pressure.     Aortic Valve  Normal tricuspid aortic valve. No aortic regurgitation is present.     Pulmonic Valve  The pulmonic valve is not well visualized.     Vessels  Dilation of the inferior vena cava is present with abnormal respiratory  variation in diameter.     Pericardium  There is no pericardial effusion.        Rhythm  The rhythm was sinus tachycardia.  _____________________________________________________________________________  __  MMode/2D Measurements & Calculations     IVSd: 0.63 cm  LVIDd: 4.9 cm  LVIDs: 3.6 cm  LVPWd:  0.69 cm  FS: 28.1 %  LV mass(C)d: 105.2 grams  LV mass(C)dI: 59.4 grams/m2  RWT: 0.28        Doppler Measurements & Calculations  PA acc time: 0.06 sec  TR max luca: 246.1 cm/sec  TR max P.2 mmHg           _____________________________________________________________________________  __           Report approved by: Asya Shell 2021 12:20 PM           Repair Performed By Another Provider Text (Leave Blank If You Do Not Want): After the tissue was excised the defect was repaired by another provider.

## 2021-02-08 NOTE — PLAN OF CARE
ICU End of Shift Summary.  For vital signs and complete assessments, please see documentation flowsheets.     Pertinent assessments: patient up to chair for approx 4.5 hours, tolerated well, napped in chair with recliner back and feet elevated, used commode with assistance.  Voided and had Bm,  Vss, tele , sats on 3 L nc mid 90s cont dyspneic with activity, occasional non productive cough.  Major Shift Events: dc'd heparin drip, started on xarelto po, bid, stop ivf, started on po antibiotic, cont coreg  Plan (Upcoming Events): cont poc, slowly increase activity to increase stamina   Discharge/Transfer Needs: tbd    Bedside Shift Report Completed :   Bedside Safety Check Completed:

## 2021-02-09 ENCOUNTER — APPOINTMENT (OUTPATIENT)
Dept: PHYSICAL THERAPY | Facility: CLINIC | Age: 66
DRG: 175 | End: 2021-02-09
Attending: INTERNAL MEDICINE
Payer: MEDICARE

## 2021-02-09 LAB
ERYTHROCYTE [DISTWIDTH] IN BLOOD BY AUTOMATED COUNT: 17.6 % (ref 10–15)
HCT VFR BLD AUTO: 24.6 % (ref 35–47)
HGB BLD-MCNC: 7.8 G/DL (ref 11.7–15.7)
MCH RBC QN AUTO: 31 PG (ref 26.5–33)
MCHC RBC AUTO-ENTMCNC: 31.7 G/DL (ref 31.5–36.5)
MCV RBC AUTO: 98 FL (ref 78–100)
PLATELET # BLD AUTO: 218 10E9/L (ref 150–450)
RBC # BLD AUTO: 2.52 10E12/L (ref 3.8–5.2)
WBC # BLD AUTO: 7.7 10E9/L (ref 4–11)

## 2021-02-09 PROCEDURE — 120N000004 HC R&B MS OVERFLOW

## 2021-02-09 PROCEDURE — 99232 SBSQ HOSP IP/OBS MODERATE 35: CPT | Performed by: INTERNAL MEDICINE

## 2021-02-09 PROCEDURE — 97116 GAIT TRAINING THERAPY: CPT | Mod: GP

## 2021-02-09 PROCEDURE — 97530 THERAPEUTIC ACTIVITIES: CPT | Mod: GP

## 2021-02-09 PROCEDURE — 36415 COLL VENOUS BLD VENIPUNCTURE: CPT | Performed by: INTERNAL MEDICINE

## 2021-02-09 PROCEDURE — 250N000013 HC RX MED GY IP 250 OP 250 PS 637: Performed by: INTERNAL MEDICINE

## 2021-02-09 PROCEDURE — 97161 PT EVAL LOW COMPLEX 20 MIN: CPT | Mod: GP

## 2021-02-09 PROCEDURE — 85027 COMPLETE CBC AUTOMATED: CPT | Performed by: INTERNAL MEDICINE

## 2021-02-09 RX ORDER — CARVEDILOL 6.25 MG/1
6.25 TABLET ORAL ONCE
Status: COMPLETED | OUTPATIENT
Start: 2021-02-09 | End: 2021-02-09

## 2021-02-09 RX ORDER — CARVEDILOL 6.25 MG/1
6.25 TABLET ORAL 2 TIMES DAILY
Status: DISCONTINUED | OUTPATIENT
Start: 2021-02-09 | End: 2021-02-10 | Stop reason: HOSPADM

## 2021-02-09 RX ADMIN — RIVAROXABAN 15 MG: 15 TABLET, FILM COATED ORAL at 17:43

## 2021-02-09 RX ADMIN — LEVOFLOXACIN 500 MG: 500 TABLET, FILM COATED ORAL at 08:37

## 2021-02-09 RX ADMIN — CARVEDILOL 6.25 MG: 6.25 TABLET, FILM COATED ORAL at 20:57

## 2021-02-09 RX ADMIN — CARVEDILOL 6.25 MG: 6.25 TABLET, FILM COATED ORAL at 09:38

## 2021-02-09 RX ADMIN — CARVEDILOL 6.25 MG: 6.25 TABLET, FILM COATED ORAL at 08:37

## 2021-02-09 RX ADMIN — RIVAROXABAN 15 MG: 15 TABLET, FILM COATED ORAL at 08:37

## 2021-02-09 ASSESSMENT — ACTIVITIES OF DAILY LIVING (ADL)
ADLS_ACUITY_SCORE: 21
ADLS_ACUITY_SCORE: 19
ADLS_ACUITY_SCORE: 19
ADLS_ACUITY_SCORE: 21

## 2021-02-09 ASSESSMENT — MIFFLIN-ST. JEOR: SCORE: 1255.38

## 2021-02-09 NOTE — PLAN OF CARE
End of Shift Summary.  For vital signs and complete assessments, please see documentation flowsheets.     Pertinent assessments: A/O, up with SBA. GRAHAM. Lungs coarse and dim in left base. O2 levels decrease with activity on RA. Other VSS. HR slightly tachycardic, adjusting meds.  Major Shift Events:  Wean O2 as able. Increased coreg.   Plan (Upcoming Events): Encourage I.S. monitor respiratory status. Wean O2 as able. S.S. consult for discharge needs. May need HHC as well.  Discharge/Transfer Needs: home tomorrow. ? Home with Home O2    Bedside Shift Report Completed   Bedside Safety Check Completed

## 2021-02-09 NOTE — PLAN OF CARE
.ICU End of Shift Summary.  For vital signs and complete assessments, please see documentation flowsheets.     Pertinent assessments: Patient alert/oriented x 4.  Up with A1 to the BSC.  GRAHAM/sats dropped slightly with activity but improved with rest.  Afebrile.  Tele ST.  BP stable.  Was on RA for most of the night but sats kept dropping as patient was sleeping.  1 L of O2 NC applied.  Patient was able to rest for a bit afterwards.  Denies any pain. LS clear/dim with nonproductive cough.  2 PIV S. L.  Oral Levaquin; Xaraleto started yesterday.  Void and small BM this shift.    Major Shift Events: as above  Plan (Upcoming Events): continue with current plan of care.    Discharge/Transfer Needs: TOF

## 2021-02-09 NOTE — PROGRESS NOTES
02/09/21 1232   Quick Adds   Type of Visit Initial PT Evaluation   Living Environment   People in home spouse   Current Living Arrangements house   Home Accessibility stairs to enter home   Number of Stairs, Main Entrance 2   Stair Railings, Main Entrance railing on right side (ascending)   Transportation Anticipated family or friend will provide   Living Environment Comments Patient lives with spouse in single story home.  Spouse reports that he will be home during the day to assist patient as needed   Self-Care   Usual Activity Tolerance good   Current Activity Tolerance fair   Equipment Currently Used at Home none   Activity/Exercise/Self-Care Comment Patient reports being independent with mobility until admission.   Disability/Function   Hearing Difficulty or Deaf no   Wear Glasses or Blind yes   Vision Management glasses   Concentrating, Remembering or Making Decisions Difficulty no   Difficulty Communicating no   Difficulty Eating/Swallowing no   Walking or Climbing Stairs Difficulty yes   Walking or Climbing Stairs   (reports becoming SOB)   Fall history within last six months no   General Information   Onset of Illness/Injury or Date of Surgery 02/06/21   Referring Physician Denver Pitts MD   Patient/Family Therapy Goals Statement (PT) return to home   Pertinent History of Current Problem (include personal factors and/or comorbidities that impact the POC) Lorna Guzman is a 65 year old female with history of cardiomyopathy (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   Existing Precautions/Restrictions fall   Cognition   Orientation Status (Cognition) oriented x 4   Affect/Mental Status (Cognition) WFL   Pain Assessment   Patient Currently in Pain No    Posture    Posture Forward head position;Protracted shoulders   Range of Motion (ROM)   ROM Comment WFL   Strength   Strength Comments decreased global strength; requires UE support during transfers, decreased activity tolerance   Bed Mobility   Comment (Bed Mobility) Independent   Transfers   Transfer Safety Comments Performs transfers between sitting and standing with SBA; when fatigued, requires CGA.    Gait/Stairs (Locomotion)   Distance in Feet (Required for LE Total Joints) 30,40,50   Comment (Gait/Stairs)  Patient amb 30 feet (SBA, on RA), 40 feet (CGA, on RA), 50 feet (SBA, on 2L NC).  On RA, patient 91% at rest, desats to 87% with activity.  On 2L NC, patient 96% at rest and desats to 92% with activity.    Balance   Balance Comments  Patient with slow gait speed; increased instability with fatigue (experienced one loss of balance upon return to room during transfer to bedside chair).   Clinical Impression   Criteria for Skilled Therapeutic Intervention yes, treatment indicated   PT Diagnosis (PT) decreased independence with functional mobility   Influenced by the following impairments decreased activity tolerance, decreased strength, decreased balance   Functional limitations due to impairments difficulties with ambulation   Clinical Presentation Stable/Uncomplicated   Clinical Presentation Rationale complex pmh, stable presentation, good social support   Clinical Decision Making (Complexity) low complexity   Therapy Frequency (PT) Daily   Predicted Duration of Therapy Intervention (days/wks) 3 days   Planned Therapy Interventions (PT) balance training;gait training;home exercise program;patient/family education;stair training;transfer training   Anticipated Equipment Needs at Discharge (PT) shower chair;walker, rolling;wheelchair   Risk & Benefits of therapy have been explained evaluation/treatment results reviewed;care plan/treatment goals reviewed;risks/benefits reviewed;current/potential barriers  reviewed;participants voiced agreement with care plan;participants included;patient;spouse/significant other   PT Discharge Planning    PT Discharge Recommendation (DC Rec) home with assist;home with home care physical therapy   PT Rationale for DC Rec Patient presents below baseline for functional mobility.  Recommend use of walker with ambulation, spouse to provide CGA during stair negotiation, use of wheelchair for long distance mobility, use of shower chair for bathing.  Patient would benefit from home RN/PT/OT to provide ongoing increase in strength, activity tolerance and independence with mobility and ADLs.   PT Brief overview of current status  Amb with CGA limited distances (30-50 feet), need supplemental O2 to remain above 90% with activity   Total Evaluation Time   Total Evaluation Time (Minutes) 10

## 2021-02-09 NOTE — PROGRESS NOTES
Oncology/Hematology Follow Up Note:    Assessment and Plan:    Lorna Guzman is a 65 year old female patient admitted 2/6 with new acute pulmonary embolism.     1.Metastatic breast cancer with metastasis to lung- now ER negative, HER-2 positive has been on chemotherapy  -Most recently has been on Pertuzumab, trastuzumab, paclitaxel last dose given on 2/2/2021  -Most recent PET CT scan showing response to treatment     PLAN: Hold chemotherapy while inpatient.  -We will discuss with cardiology also regarding resuming her HER-2 directed therapy based on current EF and new RV dysfunction.  -Follows with , will arrange OP Follow-up.     2.New acute pulmonary embolism along with acute DVT right-sided  -Hypercoagulability of malignancy  -No hypercoagulable work-up indicated  -Lifelong anticoagulation     PLAN  - ok for NOAC  -Working on transitioning and weaning down oxygen based on status. Still winded easily, await PT input.     3.Noted drop in EF at 45 to 50% along with RV severely dilated with moderately decreased RV function     PLAN:     -Patient will be arranging outpatient follow-up to discuss with cardiology also regarding timing of resuming biological therapy and further echocardiogram, this is scheduled for 2/17/21     4.Concern for possible pneumonia  -Has previously had left lower lobe collapse okay to cover Levaquin for now     5. CODE: FULL     -Patient has metastatic disease but has excellent performance status and excellent quality of life up until recently with more fatigue and toxicity of chemotherapy.    D/w Dr. Pitts, appreciate his care.  Subjective:     Feeling better, hoping to go home but understands importance of figuring out oxygen needs and physical therapy appointment to see her strength.  Chemotherapy is currently on hold      Labs:  All labs reviewed    CBC  Recent Labs   Lab 02/09/21  0517 02/06/21  2105 02/06/21  1720   WBC 7.7 9.5 10.1   HGB 7.8* 8.9* 9.6*   MCV 98 100 104*     220 260       CMP  Recent Labs   Lab 02/06/21  1721 02/06/21  1720   NA  --  132*   POTASSIUM  --  4.6   CHLORIDE  --  100   CO2  --  24   ANIONGAP  --  8   GLC  --  214*   BUN  --  10   CR  --  0.77   GFRESTIMATED 72 81   GFRESTBLACK 88 >90   CAROLYN  --  8.8       INR  Recent Labs   Lab 02/06/21  1720   INR 1.07       Blood CultureNo results for input(s): CULT in the last 168 hours.      Zain Lua MD  [facility]  2/9/2021 5:32 PM

## 2021-02-09 NOTE — PROGRESS NOTES
[facility]    Hospitalist Progress Note      Assessment & Plan   Lorna Guzman is a 65 year old female who was admitted on 2/6/2021.    Summary of Stay:   Lorna Guzman is a 65 year old female with history of metastatic breast cancer to liver, possibly lung base and pleural who is currently receiving chemotherapy, last infusion was Tuesday per patient Herceptin, who presents to hospital with SOB.  Her oncologist is Dr. Lua from [facility].     In the ED vitals notable for hypoxia requiring 10 L O2 to maintain saturations of 95%, blood pressures of 107/75, heart rates in the 120s, labs notable for sodium 132 lactic acid of 5.1 troponin of 0.140, venous blood gas pH 7.2 PCO2 52, hemoglobin 9.6 platelets of 260.  She had a CT angiogram which showed moderate burden PE bilateral, evidence of right heart strain, consolidation of the left lower lobe unchanged, new airspace disease left upper lobe relating to pneumonia or possible developing infarct.      Patient received a dose of Lovenox in the emergency room.  Admitted to the ICU.  In the morning she was started on IV heparin infusion.  Lower extremity ultrasound showed a distal DVT in the right peroneal vein.     Slowly improving.  Still appears to be weak.  Requiring about 1 L of oxygen by nasal cannula, which is a good improvement.  Gets short of breath with minimal activity.     Plan:     Acute bilateral pulmonary embolism.  Acute hypoxic respiratory failure. Right heart strain.  -Initially treated with IV heparin.  Transition to oral Xarelto on 2/8/2021.  -Still requiring some oxygen.  -Appears to be weak.  She will need physical therapy evaluation.  -Also check oxygen requirement with activity.  -Gets short of breath with walking to the bathroom although she thinks it is getting better.  -Hematology/oncology consultation appreciated  - ECHO shows RV dilatation and decreased systolic function. LVEF is 45-50% (lower than  last Echo).     GERA infiltrate on CT  -d/w oncology.  The finding on the CT is likely not new.  -while it was seen on PET CT earlier last week, the patient was not particularly symptomatic until she developed SoB on Saturday (which turned out out be PE)  -But the procalcitonin is indeterminate, so will treat with 7 days of levofloxacin.  Discussed with Dr. Lua and she agrees.     Distal right DVT  -On Xarelto now.     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    - ECHO shows RV dilatation and decreased systolic function. LVEF is 45-50%. (Echo on 1/25/2021 showed ejection fraction of 50 to 55%)  -On carvedilol at baseline, 25 twice daily.  Resumed carvedilol at a lower dose of 6.25 mg twice daily.  Increase the dose to 12.5 mg twice daily from today.  Further increase as the blood pressure allows.  -No evidence of volume overload.  -She follows up with [facility].  Saw nurse practitioner Melisa Billingsley last month.  She called the cardiology office again and supposed to see her again next Wednesday.     Elevated troponin  -Demand ischemia due to PE.     Acute on chronic anemia  -No evidence of blood loss.  In the setting of dilutional with IV fluid, no acute issues as above, frequent phlebotomy.     Metastatic breast cancer  -She follows up with Dr. Lua, [facility].     PT eval today.      is in the room, updated.     Has transfer orders.     DVT Prophylaxis: Oral Xarelto  Code Status: Full Code  Expected discharge: Possibly tomorrow.    Denver Pitts MD  [facility] Page (7am - 6pm, M-F)    Interval History   Patient was evaluated with nursing staff. Overnight issues discussed.    Review of systems:  No nausea or vomiting.  No abdominal pain.  No diarrhea.  No chest pain/palpitations.  No headache/visual disturbance/new weakness.    Overall shortness of breath is slightly better.  But still tachypneic and tachycardic.  Requiring 1 L of oxygen by nasal  cannula which was applied last night while she was sleeping.    -Data reviewed today: Labs and medications.    Physical Exam   Temp: 97.5  F (36.4  C) Temp src: Oral BP: 124/68 Pulse: 103   Resp: (!) 37 SpO2: 95 % O2 Device: Nasal cannula Oxygen Delivery: 1 LPM  Vitals:    02/06/21 2055 02/07/21 0630 02/09/21 0600   Weight: 60.6 kg (133 lb 11.2 oz) 61.8 kg (136 lb 3.9 oz) 63.6 kg (140 lb 3.4 oz)     Vital Signs with Ranges  Temp:  [97.5  F (36.4  C)-98.6  F (37  C)] 97.5  F (36.4  C)  Pulse:  [] 103  Resp:  [16-37] 37  BP: (110-127)/(65-80) 124/68  SpO2:  [90 %-98 %] 95 %  I/O last 3 completed shifts:  In: 840 [P.O.:840]  Out: 550 [Urine:550]    Constitutional: Awake, alert, cooperative, no apparent distress  HEENT: Trachea midline, sclera is clear   Respiratory: No crackles. No wheezing. Equal breath sounds bilaterally.  Cardiovascular: Regular rate and rhythm, normal S1 and S2, and no murmur noted, mild tachycardia.  GI: Normal bowel sounds, soft, non-distended, non-tender  Extremities: No pitting edema     Medications     - MEDICATION INSTRUCTIONS -         carvedilol  6.25 mg Oral Once     carvedilol  6.25 mg Oral BID w/meals     levofloxacin  500 mg Oral Daily     rivaroxaban ANTICOAGULANT  15 mg Oral BID w/meals       Data   Recent Labs   Lab 02/09/21  0517 02/07/21  0512 02/07/21  0115 02/06/21  2105 02/06/21  1720   WBC 7.7  --   --  9.5 10.1   HGB 7.8*  --   --  8.9* 9.6*   MCV 98  --   --  100 104*     --   --  220 260   INR  --   --   --   --  1.07   NA  --   --   --   --  132*   POTASSIUM  --   --   --   --  4.6   CHLORIDE  --   --   --   --  100   CO2  --   --   --   --  24   BUN  --   --   --   --  10   CR  --   --   --   --  0.77   ANIONGAP  --   --   --   --  8   CAROLYN  --   --   --   --  8.8   GLC  --   --   --   --  214*   TROPI  --  0.447* 0.616* 0.589* 0.140*       No results found for this or any previous visit (from the past 24 hour(s)).

## 2021-02-09 NOTE — PLAN OF CARE
Patient has been assessed for Home Oxygen needs.  Oxygen readings:   *   RA - at rest  Pulse oximetry SPO2 91 %  *   RA - during activity/with exercise SPO2 87%  *   O2 at  2 liters/minute (at rest) ...SPO2 96 %  *   O2 at  2 liters/minute (during activity/with exercise) ...SPO2 92 %

## 2021-02-09 NOTE — PROGRESS NOTES
Pt ambulated to bathroom with stand-by assist on RA. Pt was dyspneic in BR with HR up to 40. Pt O2 saturations were checked when back in chair and pt was 88% on RA. Pt recovered quickly after approximately 1-2 minutes with O2 levels back up to 93%. Awaiting PT arrival for evaluation. See flowsheet and MAR for additional assessment information.

## 2021-02-09 NOTE — CONSULTS
Care Management Initial Consult    General Information  Assessment completed with: Patient, Family, Pt and spouse Leoncio  Type of CM/SW Visit: Initial Assessment    Primary Care Provider verified and updated as needed:     Readmission within the last 30 days: no previous admission in last 30 days      Reason for Consult: discharge planning  Advance Care Planning:  N/A          Communication Assessment  Patient's communication style: spoken language (English or Bilingual)    Hearing Difficulty or Deaf: no   Wear Glasses or Blind: yes    Cognitive  Cognitive/Neuro/Behavioral: WDL  Level of Consciousness: alert  Arousal Level: opens eyes spontaneously  Orientation: oriented x 4  Mood/Behavior: cooperative, calm  Best Language: 0 - No aphasia  Speech: spontaneous, clear    Living Environment:   People in home: spouse  Leoncio  Current living Arrangements: house      Able to return to prior arrangements: yes       Family/Social Support:  Care provided by: self  Provides care for: no one  Marital Status:     Leoncio       Description of Support System: Supportive, Involved    Support Assessment: Adequate family and caregiver support, Adequate social supports    Current Resources:   Patient receiving home care services: No     Community Resources:    Equipment currently used at home: none  Supplies currently used at home:      Employment/Financial:  Employment Status: retired        Financial Concerns: No concerns identified           Lifestyle & Psychosocial Needs:        Socioeconomic History     Marital status:      Spouse name: Not on file     Number of children: Not on file     Years of education: Not on file     Highest education level: Not on file     Tobacco Use     Smoking status: Never Smoker     Smokeless tobacco: Never Used   Substance and Sexual Activity     Alcohol use: No     Alcohol/week: 0.0 standard drinks     Drug use: No     Sexual activity: Yes     Partners: Male       Functional  Status:  Prior to admission patient needed assistance:   Dependent ADLs:: Independent          Mental Health Status:  Mental Health Status: No Current Concerns       Chemical Dependency Status:  Chemical Dependency Status: No Current Concerns             Values/Beliefs:  Spiritual, Cultural Beliefs, Spiritism Practices, Values that affect care:                 Additional Information:  Met with patient and  regarding discharge to home with home care.  Patient is aware of home care options and preferred to stay in the [facility] system.  Discussed equipment needs, patient has access to a walker and wheelchair at home.  Discussed the options of where  can  a shower chair.  A referral will be made to [facility] for home oxygen.  Spouse will be able to provide transportation home.    Corinne C. White, LSW

## 2021-02-10 ENCOUNTER — DOCUMENTATION ONLY (OUTPATIENT)
Dept: SLEEP MEDICINE | Facility: CLINIC | Age: 66
End: 2021-02-10

## 2021-02-10 ENCOUNTER — APPOINTMENT (OUTPATIENT)
Dept: PHYSICAL THERAPY | Facility: CLINIC | Age: 66
DRG: 175 | End: 2021-02-10
Payer: MEDICARE

## 2021-02-10 ENCOUNTER — TELEPHONE (OUTPATIENT)
Dept: SLEEP MEDICINE | Facility: CLINIC | Age: 66
End: 2021-02-10

## 2021-02-10 VITALS
WEIGHT: 140.21 LBS | DIASTOLIC BLOOD PRESSURE: 63 MMHG | BODY MASS INDEX: 20.77 KG/M2 | TEMPERATURE: 97.5 F | HEIGHT: 69 IN | SYSTOLIC BLOOD PRESSURE: 101 MMHG | HEART RATE: 110 BPM | RESPIRATION RATE: 18 BRPM | OXYGEN SATURATION: 95 %

## 2021-02-10 LAB
ERYTHROCYTE [DISTWIDTH] IN BLOOD BY AUTOMATED COUNT: 17.7 % (ref 10–15)
HCT VFR BLD AUTO: 24.3 % (ref 35–47)
HGB BLD-MCNC: 7.7 G/DL (ref 11.7–15.7)
MCH RBC QN AUTO: 30.7 PG (ref 26.5–33)
MCHC RBC AUTO-ENTMCNC: 31.7 G/DL (ref 31.5–36.5)
MCV RBC AUTO: 97 FL (ref 78–100)
PLATELET # BLD AUTO: 247 10E9/L (ref 150–450)
RBC # BLD AUTO: 2.51 10E12/L (ref 3.8–5.2)
WBC # BLD AUTO: 6.3 10E9/L (ref 4–11)

## 2021-02-10 PROCEDURE — 97116 GAIT TRAINING THERAPY: CPT | Mod: GP

## 2021-02-10 PROCEDURE — 250N000013 HC RX MED GY IP 250 OP 250 PS 637: Performed by: INTERNAL MEDICINE

## 2021-02-10 PROCEDURE — 99239 HOSP IP/OBS DSCHRG MGMT >30: CPT | Performed by: INTERNAL MEDICINE

## 2021-02-10 PROCEDURE — 36415 COLL VENOUS BLD VENIPUNCTURE: CPT | Performed by: INTERNAL MEDICINE

## 2021-02-10 PROCEDURE — 85027 COMPLETE CBC AUTOMATED: CPT | Performed by: INTERNAL MEDICINE

## 2021-02-10 RX ORDER — RIVAROXABAN 15 MG-20MG
KIT ORAL
Qty: 1 EACH | Refills: 0 | Status: SHIPPED | OUTPATIENT
Start: 2021-02-10 | End: 2021-06-23

## 2021-02-10 RX ORDER — LEVOFLOXACIN 500 MG/1
500 TABLET, FILM COATED ORAL DAILY
Qty: 4 TABLET | Refills: 0 | Status: SHIPPED | OUTPATIENT
Start: 2021-02-11 | End: 2021-02-15

## 2021-02-10 RX ORDER — CARVEDILOL 6.25 MG/1
6.25 TABLET ORAL 2 TIMES DAILY WITH MEALS
Qty: 60 TABLET | Refills: 0 | Status: SHIPPED | OUTPATIENT
Start: 2021-02-10 | End: 2021-03-02

## 2021-02-10 RX ADMIN — LEVOFLOXACIN 500 MG: 500 TABLET, FILM COATED ORAL at 09:03

## 2021-02-10 RX ADMIN — CARVEDILOL 6.25 MG: 6.25 TABLET, FILM COATED ORAL at 09:03

## 2021-02-10 RX ADMIN — RIVAROXABAN 15 MG: 15 TABLET, FILM COATED ORAL at 09:08

## 2021-02-10 ASSESSMENT — ACTIVITIES OF DAILY LIVING (ADL)
ADLS_ACUITY_SCORE: 17
ADLS_ACUITY_SCORE: 19
ADLS_ACUITY_SCORE: 17

## 2021-02-10 NOTE — PLAN OF CARE
Problem: Adult Inpatient Plan of Care  Goal: Plan of Care Review  Outcome: Adequate for Discharge  Goal: Patient-Specific Goal (Individualized)  Outcome: Adequate for Discharge  Goal: Absence of Hospital-Acquired Illness or Injury  Outcome: Adequate for Discharge  Intervention: Identify and Manage Fall Risk  Recent Flowsheet Documentation  Taken 2/10/2021 0751 by Dorothy Mendoza RN  Safety Promotion/Fall Prevention:   assistive device/personal items within reach   clutter free environment maintained   fall prevention program maintained   increased rounding and observation   nonskid shoes/slippers when out of bed   patient and family education   room organization consistent  Intervention: Prevent Skin Injury  Recent Flowsheet Documentation  Taken 2/10/2021 0751 by Dorothy Mendoza RN  Body Position: position changed independently  Goal: Optimal Comfort and Wellbeing  Outcome: Adequate for Discharge  Goal: Readiness for Transition of Care  Outcome: Adequate for Discharge     Problem: Venous Thromboembolism  Goal: VTE Symptom Resolution  Outcome: Adequate for Discharge

## 2021-02-10 NOTE — PLAN OF CARE
Physical Therapy Discharge Summary    Reason for therapy discharge:    Discharged to home with home therapy.    Progress towards therapy goal(s). See goals on Care Plan in [facility] electronic health record for goal details.  Goals partially met.  Barriers to achieving goals:   discharge from facility.   to provide SBA with stair negotiation.    Therapy recommendation(s):    Continued therapy is recommended.  Rationale/Recommendations:  Patient would benefit from Home PT in order to increase strength, activity tolerance and independence with mobility. Patient requires home PT at this time as attending PT in a clinic setting would be a considerable and significantly taxing effort, limiting her ability to participate in therapy session.

## 2021-02-10 NOTE — PROGRESS NOTES
A&Ox4, VSS, denies pain, IV removed, home Medications given to patient with instruction. All questions answered. Belongings and portable O2 sent home with patient.  Accompanied by spouse.

## 2021-02-10 NOTE — PROGRESS NOTES
CTS made referral to [facility] for home oxygen.  Miesha Jolley RN, BSN, PHN, CTS  Care Coordinator  [facility]  [phone]

## 2021-02-10 NOTE — PROGRESS NOTES
Received intake call for home oxygen at 11:07am from my coworker Tiffani Reviewed patient's chart; Patient qualifies under relaxed insurance guidelines and all documentation is in the chart including a good order.   11:17AM- Called care coordinator Miesha. We can bill under relaxed medicare guidelines with notes talking about patient having acute diagnosis. However, notes also talk about metastases to lungs. She will see if doctor will addend smartphrase to talk about metastases instead of acute embolism.   12:01PM- Miesha called back, doctor is prescribing o2 today due to patient's embolism. I told her I will let patient know she may need to requalify down the road, but I will call to offer choice.   12:03PM- Called to offer choice and patient is okay with [facility] setting them up. Discussed equipment with patient and informed them that we would be to bedside with oxygen in the next 2 hours.

## 2021-02-10 NOTE — PROGRESS NOTES
Oxygen Documentation:   I certify that this patient, Lorna Guzman has been under my care (or a nurse practitioner or physican's assistant working with me). This is the face-to-face encounter for oxygen medical necessity.      Lorna Guzman is now in a chronic stable state and continues to require supplemental oxygen. Patient has continued oxygen desaturation due to acute pulmonary embolism.    Alternative treatment(s) tried or considered and deemed clinically infective for treatment of acute pulmonary embolism include pulmonary toileting and heparin and xarelto.  If portability is ordered, is the patient mobile within the home? Yes    Mir Hairston MD

## 2021-02-10 NOTE — PROGRESS NOTES
[facility]  Spoke with pt and spouse to discuss plans for HC.  Pt to be discharged home today and has agreed to have [facility] follow with services of SN, PT and OT. Patient care support center processing referral.  Pt verbalized understanding that initial visit is scheduled for 2/11 or 2/12/2021. Pt has 24 hour phone number for [facility] for any questions or concerns provided on AVS.

## 2021-02-10 NOTE — DISCHARGE SUMMARY
[facility]  Discharge Summary  Name: Lorna Guzman    MRN: [id number]  YOB: 1955    Age: 65 year old  Date of Discharge:  2/10/2021  Date of Admission: 2/6/2021  Primary Care Provider: Max Moreno  Discharge Physician:  Solitario Hairston MD  Discharging Service:  Hospitalist      Hospital Course/Discharge Diagnoses:  Lorna Guzman is a 65 year old female with history of metastatic breast cancer to liver, possibly lung base and pleural who is currently receiving chemotherapy who presented to the hospital with shortness of breath.  Her oncologist is Dr. Lua from [facility].     In the ED vitals notable for hypoxia requiring 10 L O2 to maintain saturations of 95%, blood pressures of 107/75, heart rates in the 120s, labs notable for sodium 132 lactic acid of 5.1 troponin of 0.140, venous blood gas pH 7.2 PCO2 52, hemoglobin 9.6 platelets of 260.  She had a CT angiogram which showed moderate burden PE bilaterally, evidence of right heart strain, consolidation of the left lower lobe unchanged, new airspace disease left upper lobe relating to pneumonia or possible developing infarct.      Patient received a dose of Lovenox in the emergency room and admitted to the ICU.  In the morning she was started on IV heparin infusion.  Lower extremity ultrasound showed a distal DVT in the right peroneal vein.  Since admission she has been slowly improving and transitioned to xarelto.  In the setting of a possible infiltrate on lung imaging was started on levaquin.      At this point she will discharge home on xarelto with follow up planned w/ Oncology and her usual cardiology clinic ([facility]).  She has been requiring low rate supplemental O2 with activity which is being weaned.       Plan:     Acute bilateral pulmonary embolism.  Acute hypoxic respiratory failure. Right heart strain.  -Initially treated with IV heparin.  Transitioned to oral Xarelto on 2/8/2021.  -physical  therapy evaluations while here.  -Hematology/oncology consultation appreciated  - ECHO shows RV dilatation and decreased systolic function. LVEF is 45-50% (lower than last Echo).     GERA infiltrate on CT  -d/w oncology.  The finding on the CT is likely not new.  -while it was seen on PET CT earlier last week, the patient was not particularly symptomatic until she developed SoB on Saturday prior to admission(which turned out out be PE)  -however, procalcitonin is indeterminate, so will treat with 7 days of levofloxacin.  Discussed with Dr. Lua and she agrees.  Currently day 3.     Distal right DVT  -On Xarelto now.     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    - ECHO shows RV dilatation and decreased systolic function. LVEF is 45-50%. (Echo on 1/25/2021 showed ejection fraction of 50 to 55%)  -On carvedilol at baseline, 25 twice daily.  Resumed carvedilol at a lower dose of 6.25 mg twice daily.  Further increase as the blood pressure allows, she will monitor at home.  -No evidence of volume overload.  -She follows up with [facility].  Saw nurse practitioner Melisa Billingsley last month.  She called the cardiology office again and supposed to see her again next Wednesday, 7 days from now.     Elevated troponin  -Demand ischemia due to PE.     Acute on chronic anemia  -No evidence of blood loss.  In the setting of dilutional with IV fluid, no acute issues as above, frequent phlebotomy.     Metastatic breast cancer  -She follows up with Dr. Lua, [place] [facility].     PT eval today.      is in the room, updated on the discharge plan.      Discharge Disposition:  Discharged to home     Allergies:  Allergies   Allergen Reactions     Lisinopril Hives and Swelling     Venlafaxine Hives and Swelling     Possibly allergy        Discharge Medications:        Review of your medicines      START taking      Dose / Directions   levofloxacin 500 MG tablet  Commonly  known as: LEVAQUIN  Indication: Community Acquired Pneumonia  Used for: Community acquired pneumonia, unspecified laterality      Dose: 500 mg  Start taking on: February 11, 2021  Take 1 tablet (500 mg) by mouth daily for 4 days  Quantity: 4 tablet  Refills: 0     XARELTO STARTER PACK ANTICOAGULANT 15 & 20 MG Tbpk  Generic drug: Rivaroxaban ANTICOAGULANT      xarelto starter pack:    Take 15 mg twice daily for 21 days followed by 20 mg once per day in the evening after that.  Future refills via clinic (likely Oncology)  Quantity: 1 each  Refills: 0        CONTINUE these medicines which may have CHANGED, or have new prescriptions. If we are uncertain of the size of tablets/capsules you have at home, strength may be listed as something that might have changed.      Dose / Directions   carvedilol 6.25 MG tablet  Commonly known as: COREG  This may have changed:     medication strength    how much to take    additional instructions  Used for: Secondary cardiomyopathy (H)      Dose: 6.25 mg  Take 1 tablet (6.25 mg) by mouth 2 times daily (with meals) If your systolic blood pressure is consistently >140 OK to increase to 12.5 mg twice daily  Quantity: 60 tablet  Refills: 0        CONTINUE these medicines which have NOT CHANGED      Dose / Directions   dexamethasone 4 MG tablet  Commonly known as: DECADRON      Dose: 4 mg  Take 4 mg by mouth TAKE 5 TABLETS BY MOUTH AS DIRECTED. TAKE 5 TABLETS BY MOUTH 12HOURS AND 6 HOURS BEFORE PACLITAXEL INFUSION  Refills: 0     PACLITAXEL IV      Inject into the vein every 7 days  Refills: 0     PERTUZUMAB IV      Inject into the vein every 21 days  Refills: 0     trastuzumab 150 MG Solr injection  Commonly known as: HERCEPTIN      Inject into the vein every 21 days Chemo agent / HERCEPTIN  Refills: 0           Where to get your medicines      These medications were sent to [facility] [place], [place] - [address] [facility]  [address] [facility] [address]  "   Phone: [phone]     carvedilol 6.25 MG tablet    levofloxacin 500 MG tablet     Some of these will need a paper prescription and others can be bought over the counter. Ask your nurse if you have questions.    Bring a paper prescription for each of these medications    XARELTO STARTER PACK ANTICOAGULANT 15 & 20 MG Tbpk         Condition on Discharge:  Discharge condition: Stable       Code status on discharge: Full Code     History of Illness:  See detailed admission note for full details.    Physical Exam:  Vital signs:  Temp: 97.7  F (36.5  C) Temp src: Oral BP: 127/81 Pulse: 99   Resp: 20 SpO2: 93 % O2 Device: None (Room air) Oxygen Delivery: 1 LPM Height: 175.3 cm (5' 9\") Weight: 63.6 kg (140 lb 3.4 oz)  Estimated body mass index is 20.71 kg/m  as calculated from the following:    Height as of this encounter: 1.753 m (5' 9\").    Weight as of this encounter: 63.6 kg (140 lb 3.4 oz).    Wt Readings from Last 1 Encounters:   02/09/21 63.6 kg (140 lb 3.4 oz)     General: Alert, awake, no acute distress.  HEENT: NC/AT, eyes anicteric, external occular movements intact, face symmetric.  Dentition WNL, MM moist.  Cardiac: RRR, S1, S2.  No murmurs appreciated.  Pulmonary: Normal chest rise, normal work of breathing.  Lungs CTA BL  Abdomen: soft, non-tender, non-distended.  Bowel Sounds Present.  No guarding.  Extremities: no deformities.  Warm, well perfused.  Skin: no rashes or lesions noted.  Warm and Dry.  Neuro: No focal deficits noted.  Speech clear.  Coordination and strength grossly normal.  Psych: Appropriate affect.    Procedures other than Imaging:  none     Imaging:  Results for orders placed or performed during the hospital encounter of 02/06/21   CT Chest Pulmonary Embolism w Contrast    Narrative    EXAM: CT CHEST PULMONARY EMBOLISM W CONTRAST  LOCATION: [facility]  DATE/TIME: 2/6/2021 5:29 PM    INDICATION: PE suspected, high prob. Hypoxia with history of breast cancer.  COMPARISON: " Diagnostic CT 10/22/2020  TECHNIQUE: CT chest pulmonary angiogram during arterial phase injection of IV contrast. Multiplanar reformats and MIP reconstructions were performed. Dose reduction techniques were used.   CONTRAST: 62mL Isovue-370    FINDINGS:  ANGIOGRAM CHEST: Exam is positive for bilateral acute pulmonary emboli. Moderate burden of emboli well depicted within right pulmonary arterial tree. On the left side there is some pre-existing anatomic distortion related to chronic disease but new   arterial filling defects are also seen on the left. Thoracic aorta is negative for dissection.   There does appear to be mild right heart strain with RV/LV ratio now greater than 1.    LUNGS AND PLEURA: Chronic atelectasis of left lower lobe similar to previous exam. Patchy airspace consolidation posterior aspect of left upper lobe is new there is also faint airspace nodules present within lingula possibly related to superimposed   pneumonia or developing pulmonary infarct. Minimal peripheral groundglass opacities seen right costophrenic sulcus, again atypical pneumonia or developing infarct possible.    MEDIASTINUM/AXILLAE: Calcified left hilar region lymph nodes unchanged. No new adenopathy.    UPPER ABDOMEN: Splenic granulomata.    MUSCULOSKELETAL: Mild scoliosis.      Impression    IMPRESSION:  1.  Moderate burden of acute bilateral pulmonary emboli. Findings discussed with Dr. Locke at 1820 hours.  2.  There is evidence for right heart strain with RV/LV ratio greater than 1.  3.  Consolidation of majority of left lower lobe unchanged.  4.  New airspace disease involving predominantly left upper lobe may relate to pneumonia or possible developing infarct.   US Lower Extremity Venous Duplex Bilateral    Narrative    EXAM: US LOWER EXTREMITY VENOUS DUPLEX BILATERAL  LOCATION: [facility]  DATE/TIME: 2/6/2021 9:03 PM    INDICATION: Pulmonary emboli. Shortness of breath. Evaluate for DVT.  COMPARISON:  CTA chest exam 2021  TECHNIQUE: Venous Duplex ultrasound of bilateral lower extremities with and without compression, augmentation and duplex. Color flow and spectral Doppler with waveform analysis performed.    FINDINGS: Exam includes the common femoral, femoral, popliteal veins as well as segmentally visualized deep calf veins and greater saphenous vein.     RIGHT: There is acute thrombus within the right peroneal vein in the upper calf. The rest of the deep venous system is patent. No superficial thrombophlebitis. No popliteal cyst.    LEFT: No deep vein thrombosis. No superficial thrombophlebitis. No popliteal cyst.      Impression    IMPRESSION:  1.  Acute DVT within the proximal peroneal vein.   2.  The left lower extremity is negative for DVT.  3.  Results discussed with Dr. Jenn Sigala on 2021 at 10:00 PM.   Echo Limited    Narrative    [id number]  [id number]  [id number]  [id number]^LILLIAN^JENN^PAVELRAGINI           [facility]  Echocardiography Laboratory  [address] [place] [address]        Name: KIMMIE PRICE  MRN: [id number]  : 1955  Study Date: 2021 08:43 AM  Age: 65 yrs  Gender: Female  Patient Location: [facility]  Reason For Study: Pulmonary Emboli  Ordering Physician: JENN SIGALA  Referring Physician: ANGEL MCDONNELL  Performed By: Beth Aguilar     BSA: 1.8 m2  Height: 69 in  Weight: 139 lb  BP: 109/73 mmHg  _____________________________________________________________________________  __        Procedure  Limited Portable Echo Adult. Optison (NDC #[id number]) given intravenously.  _____________________________________________________________________________  __        Interpretation Summary     The right ventricle is severely dilated.  Moderately decreased right ventricular systolic function  Left ventricular systolic function is mildly reduced.  The visual ejection fraction is estimated at 45-50%.  Dilation of the inferior vena cava is present with  abnormal respiratory  variation in diameter.  _____________________________________________________________________________  __        Left Ventricle  The left ventricle is normal in size. There is normal left ventricular wall  thickness. Left ventricular systolic function is mildly reduced. The visual  ejection fraction is estimated at 45-50%. Left ventricular diastolic function  is indeterminate. There is mild global hypokinesia of the left ventricle.     Right Ventricle  The right ventricle is severely dilated. Moderately decreased right  ventricular systolic function.     Atria  Normal left atrial size. The right atrium is mildly dilated.     Mitral Valve  There is mild to moderate (1-2+) mitral regurgitation. The mitral regurgitant  jet is posteriorly directed, which is consistent with anterior leaflet  pathology.        Tricuspid Valve  Normal tricuspid valve. There is mild (1+) tricuspid regurgitation. The right  ventricular systolic pressure is approximated at 24mmHg plus the right atrial  pressure.     Aortic Valve  Normal tricuspid aortic valve. No aortic regurgitation is present.     Pulmonic Valve  The pulmonic valve is not well visualized.     Vessels  Dilation of the inferior vena cava is present with abnormal respiratory  variation in diameter.     Pericardium  There is no pericardial effusion.        Rhythm  The rhythm was sinus tachycardia.  _____________________________________________________________________________  __  MMode/2D Measurements & Calculations     IVSd: 0.63 cm  LVIDd: 4.9 cm  LVIDs: 3.6 cm  LVPWd: 0.69 cm  FS: 28.1 %  LV mass(C)d: 105.2 grams  LV mass(C)dI: 59.4 grams/m2  RWT: 0.28        Doppler Measurements & Calculations  PA acc time: 0.06 sec  TR max luca: 246.1 cm/sec  TR max P.2 mmHg           _____________________________________________________________________________  __           Report approved by: Asya Shell 2021 12:20 PM              Consultations:  Consultation during this admission received from oncology.       Recent Lab Results:  Recent Labs   Lab 02/10/21  0534 02/09/21  0517 02/06/21  2105   WBC 6.3 7.7 9.5   HGB 7.7* 7.8* 8.9*   HCT 24.3* 24.6* 28.7*   MCV 97 98 100    218 220          Lab Results   Component Value Date     02/06/2021     06/15/2017     04/14/2017    Lab Results   Component Value Date    CHLORIDE 100 02/06/2021    CHLORIDE 106 06/15/2017    CHLORIDE 107 04/14/2017    Lab Results   Component Value Date    BUN 10 02/06/2021    BUN 12 06/15/2017    BUN 16 04/14/2017    BUN 16 04/14/2017      Lab Results   Component Value Date    POTASSIUM 4.6 02/06/2021    POTASSIUM 4.7 06/15/2017    POTASSIUM 4.7 04/14/2017    Lab Results   Component Value Date    CO2 24 02/06/2021    CO2 25 06/15/2017    CO2 27 04/14/2017    Lab Results   Component Value Date    CR 0.77 02/06/2021    CR 0.88 06/15/2017    CR 0.81 04/14/2017             Pending Results:    Unresulted Labs Ordered in the Past 30 Days of this Admission     No orders found from 1/7/2021 to 2/7/2021.           Discharge Instructions and Follow-Up:   Discharge Procedure Orders   Reason for your hospital stay   Order Comments: You were admitted for respiratory failure due to pulmonary emboli.  You were treated with IV heparin and now transitioned to xarelto.     Follow-up and recommended labs and tests    Order Comments: Follow up with Dr. Lua in Oncology clinic as discussed.  Please revisit obtaining xarelto refills at that time.    Follow up in one week with [facility] as discussed.  We have reduced your coreg to 6.25 mg twice daily, if your systolic blood pressure is consistently >140 it is OK to increase this to 12.5 mg twice daily in the meantime.     Activity   Order Comments: Your activity upon discharge: activity as tolerated     Order Specific Question Answer Comments   Is discharge order? Yes      Oxygen Adult/Peds   Order Comments: Oxygen  Documentation:   I certify that this patient, Lorna Guzman has been under my care (or a nurse practitioner or physican's assistant working with me). This is the face-to-face encounter for oxygen medical necessity.      Lorna Guzman is now in a chronic stable state and continues to require supplemental oxygen. Patient has continued oxygen desaturation due to acute pulmonary embolism.    Alternative treatment(s) tried or considered and deemed clinically infective for treatment of acute pulmonary embolism include pulmonary toileting and heparin and xarelto.  If portability is ordered, is the patient mobile within the home? yes    **Patients who qualify for home O2 coverage under the CMS guidelines require ABG tests or O2 sat readings obtained closest to, but no earlier than 2 days prior to the discharge, as evidence of the need for home oxygen therapy. Testing must be performed while patient is in the chronic stable state. See notes for O2 sats.**     Order Specific Question Answer Comments   DME Provider: [facility]    Type: New/Recertification    Oxygen Consult Reqt: Call [facility] before patient leaves at [phone] (to ensure SATS testing is completed).    Did the patient have SpO2 (sat) testing (only needed for new oxgyen or liter flow changes)? Yes    Length of Need: Lifetime    Frequency of Use: With Activity    Mode of Delivery - With Activity Nasal Cannula    Liter Flow - With Activity (LPM): 2    Need for Portability: Yes    Evaluate for Conserving Device: Yes    Maintain Sats >= 90%    The face to face evaluation was performed on: 2/10/2021      Diet   Order Comments: Follow this diet upon discharge: Orders Placed This Encounter      Snacks/Supplements Adult: Other; high protein ice cream shakes; Between Meals      Regular Diet Adult     Order Specific Question Answer Comments   Is discharge order? Yes        Total time spent in face to face contact with the patient  and coordinating discharge was:  50 Minutes.

## 2021-02-10 NOTE — PLAN OF CARE
PT: Patient seen by physical therapy for treatment session.  Patient reported feeling better today, on RA while seated.  Patient reports only ambulation since yesterday's therapy session has been into the bathroom.  Patient amb 75, 50, 100 feet with SBA from therapist.  Patient amb 75, 50 feet without supplemental O2; patient reported increased fatigue, SOB, increased HR.  Patient required 2-3 min of sustained seated rest to recover to baseline.  Patient amb 100 feet with 2L NC; reported improved activity tolerance with less SOB, required less than 1 min seated rest break to recover to baseline.    Patient stated that while she feels better walking with supplemental O2, she would prefer to discharge without supplemental O2.  Therapy did discuss that patient has not been walking much while in the hospital and fatigues quickly with ambulation.      Patient has been assessed for Home Oxygen needs.  Oxygen readings:   *   RA - at rest  Pulse oximetry SPO2 95%  *   RA - during activity/with exercise SPO2 88%  *   O2 at  2 liters/minute (at rest) ...SPO2 97 %  *   O2 at  2 liters/minute (during activity/with exercise) ...SPO2 92 %

## 2021-02-10 NOTE — PLAN OF CARE
ICU End of Shift Summary.  For vital signs and complete assessments, please see documentation flowsheets.     Pertinent assessments: A&OX4. Anxious to go home. SBA, ambulating to restroom. GRAHAM. Lungs slightly coarse. VSS on RA. Tele SR. Urinating adequately. No BM.   Major Shift Events: Able to remain off oxygen while sleeping   Plan (Upcoming Events): discharge today   Discharge/Transfer Needs: home with O2?    Bedside Shift Report Completed : y  Bedside Safety Check Completed: y

## 2021-02-10 NOTE — DISCHARGE INSTRUCTIONS
Your home care referral was sent to [facility]  If you haven't heard from them within the next 24-48 hours,  Please call them at [phone]    Oxygen Provider:  Arranged through [facility] [employer], contact number [phone]. If you have any questions or concerns please call the oxygen company directly.

## 2021-02-10 NOTE — PROGRESS NOTES
Oncology/Hematology Follow Up Note:    Assessment and Plan:  Lorna Guzman is a 65 year old female patient admitted 2/6 with new acute pulmonary embolism.     1.Metastatic breast cancer with metastasis to lung- now ER negative, HER-2 positive has been on chemotherapy  -Most recently has been on Pertuzumab, trastuzumab, paclitaxel last dose given on 2/2/2021  -Most recent PET CT scan showing response to treatment     PLAN: Hold chemotherapy while inpatient.  -We will discuss with cardiology also regarding resuming her HER-2 directed therapy based on current EF and new RV dysfunction.  -Follows with , will arrange OP Follow-up.     2.New acute pulmonary embolism along with acute DVT right-sided  -Hypercoagulability of malignancy  -No hypercoagulable work-up indicated  -Lifelong anticoagulation     PLAN  - ok for NOAC( lifelong)  -Working on transitioning and weaning down oxygen based on status. Still winded easily, await PT input.     3.Noted drop in EF at 45 to 50% along with RV severely dilated with moderately decreased RV function     PLAN:     -Patient will be arranging outpatient follow-up to discuss with cardiology also regarding timing of resuming biological therapy and further echocardiogram, this is scheduled for 2/17/21     4.Concern for possible pneumonia  -Has previously had left lower lobe collapse okay to cover Levaquin for now     5. CODE: FULL     -Patient has metastatic disease but has excellent performance status and excellent quality of life up until recently with more fatigue and toxicity of chemotherapy.      Waiting to see, may need home oxygen.  Subjective:    Feeling so much better ready to go home, has mild dyspnea on exertion but overall feeling much better energy, appetite, mood are good      Labs:  All labs reviewed    CBC  Recent Labs   Lab 02/10/21  0534 02/09/21  0517 02/06/21  2105   WBC 6.3 7.7 9.5   HGB 7.7* 7.8* 8.9*   MCV 97 98 100    218 220       CMP  Recent Labs    Lab 02/06/21  1721 02/06/21  1720   NA  --  132*   POTASSIUM  --  4.6   CHLORIDE  --  100   CO2  --  24   ANIONGAP  --  8   GLC  --  214*   BUN  --  10   CR  --  0.77   GFRESTIMATED 72 81   GFRESTBLACK 88 >90   CAROLYN  --  8.8       INR  Recent Labs   Lab 02/06/21  1720   INR 1.07       Blood CultureNo results for input(s): CULT in the last 168 hours.      Zain Lua MD  [facility]  2/10/2021 5:35 PM

## 2021-02-19 ENCOUNTER — VIRTUAL VISIT (OUTPATIENT)
Dept: CARDIOLOGY | Facility: CLINIC | Age: 66
End: 2021-02-19
Payer: MEDICARE

## 2021-02-19 DIAGNOSIS — I26.02 ACUTE SADDLE PULMONARY EMBOLISM WITH ACUTE COR PULMONALE (H): ICD-10-CM

## 2021-02-19 DIAGNOSIS — I42.9 SECONDARY CARDIOMYOPATHY (H): Primary | ICD-10-CM

## 2021-02-19 DIAGNOSIS — C50.911 MALIGNANT NEOPLASM OF RIGHT FEMALE BREAST, UNSPECIFIED ESTROGEN RECEPTOR STATUS, UNSPECIFIED SITE OF BREAST (H): ICD-10-CM

## 2021-02-19 PROCEDURE — 99214 OFFICE O/P EST MOD 30 MIN: CPT | Mod: 95 | Performed by: INTERNAL MEDICINE

## 2021-02-19 NOTE — PROGRESS NOTES
Service Date: 02/19/2021      HISTORY OF PRESENT ILLNESS:  Ms. Guzman is a pleasant 65-year-old female who is here for a followup visit.  She has formally seen Dr. Gavin and Melisa Billingsley in our clinic for monitoring for cardiotoxicity related to chemotherapy.        She initially was diagnosed with breast cancer that was ER/SC positive 2006 and underwent a lumpectomy.  She had CHOP therapy and adjuvant radiation at that time.  She was diagnosed with recurrent biologic lead different breast cancer in the same breast in 2017.  She had evidence of metastatic disease in the liver at that time.      She was once again treated with chemotherapy with successful remission.  In 2017, she did demonstrate evidence for mild cardiomyopathy, likely secondary to exposure to Adriamycin therapy in 2006.  She was started on carvedilol and lisinopril, but did develop some angioedema and the lisinopril was subsequently stopped.  She had remained on carvedilol and had stable LV function throughout her chemotherapy.        Unfortunately, she had evidence of recurrence of metastatic disease in 2019.  She had some changes to her chemotherapy agents, but had remained stable with her cardiomyopathy.  Earlier this month, she developed bilateral pulmonary embolism and was hospitalized and placed on Xarelto.  A limited echocardiogram suggested probably stable LV systolic function, but she had significant RV dysfunction related to the pulmonary embolism.  The RV cavity was moderately to severely dilated with moderately reduced function.  Her chemo had been interrupted and she also had a reduction in her carvedilol dose because of low blood pressures.        Since discharge, she has been doing well.  She is not experiencing any difficulty breathing.  She is very concerned about when she may be able to resume her chemotherapy.        She does continue to monitor her blood pressures at home.  She had previously been on 25 mg of carvedilol  twice daily and is down to 6.25 mg twice daily, again because of the low blood pressures during her hospitalization.  She reported her blood pressure at 128/74 and a pulse rate of 86 today.        According to Dr. Lua's last note and per patient history, she had been on Herceptin every 3 weeks and paclitaxel.  She had completed 10 of 12 cycles.      IMPRESSION:   In summary, Ms. Guzman is a very pleasant 65-year-old female with recurrent metastatic right-sided breast cancer, recent acute bilateral pulmonary embolism with significant RV dysfunction, previous mild nonischemic cardiomyopathy secondary to previous Adriamycin exposure.  Herceptin and paclitaxel have been interrupted because of the PE and RV dysfunction.  She was discharged on 02/10 from the hospital.        She has been doing well from a cardiac perspective.  I would expect that she will most likely have full recovery of right heart function with treatment for her pulmonary embolism.  I would recommend we wait about a month from the time of her diagnosis before repeating a limited echo to reassess RV function.  If this has recovered at that time, she may resume chemotherapy.  I would also like her to continue to monitor her blood pressures.  Until we see her right heart recover, I would not titrate her carvedilol back up unless she starts to have difficulty with high blood pressures.        She will contact us through [employer] if her blood pressures start to increase.  Otherwise, I will plan to see her back in about 2 weeks with a limited echocardiogram.        Please feel free to contact me with any questions you have in regards to her care.      cc:      Max Moreno MD    [facility]   [address] [place] [address]       Zain Lua MD    [facility]   [address]   [place] [address]         DELTA MORGAN DO             D: 02/19/2021   T: 02/19/2021   MT: ROOSEVELT      Name:     DEE  KIMMIE   MRN:      [id number]        Account:      [id number]   :      1955           Service Date: 2021      Document: [id number]

## 2021-02-19 NOTE — LETTER
2/19/2021    Max Moreno MD  [address]  [facility] [address]    RE: Lorna Guzman       Dear Colleague,    I had the pleasure of seeing Lorna Guzman in the [facility].    Lorna is a 65 year old who is being evaluated via a billable video visit.     Review Of Systems  Skin: negative  Eyes: negative  Ears/Nose/Throat: negative  Respiratory: Dyspnea on exertion-   Cardiovascular: negative  Gastrointestinal: negative  Genitourinary: negative  Musculoskeletal: negative  Neurologic: negative  Psychiatric: negative  Hematologic/Lymphatic/Immunologic: allergies  Endocrine: negative  General:  no apparent distress, normal body habitus, sitting upright.  ENT/Mouth:  membranes moist, no nasal discharge.  Normal head shape, no apparent injury or laceration.  Eyes:  no scleral icterus, normal conjunctivae.  No observed jaundice.  Neck:  no apparent neck swelling.   Chest/Lungs:  No breathing difficulty while speaking.  No audible wheezing.  No cough during conversation.  Cardiovascular:  No obviously elevated jugular venous pressure  Skin:  no xanthelasma.  No facial lacerations.  Neurologic:  Normal arm motion bilateral, no tremors.    Psychiatric:  Alert and oriented x3, calm demeanor    The rest of the comprehensive physical examination is deferred due to public health emergency video visit restrictions.    Patient reported vitals:  BP:128/74  Heart rate:86  Weight: 139  seven days ago    Kenyetta RAM     How would you like to obtain your AVS? Mail a copy  If the video visit is dropped, the invitation should be resent by: Text to cell phone: [phone]  Will anyone else be joining your video visit? No      Video Start Time: 10:51a  Video-Visit Details    Type of service:  Video Visit    Video End Time:11:39a    Originating Location (pt. Location): Home    Distant Location (provider location):  [facility]  [facility]     Platform used for Video Visit: ValenTx    Service Date: 02/19/2021      HISTORY OF PRESENT ILLNESS:  Ms. Guzman is a pleasant 65-year-old female who is here for a followup visit.  She has formally seen Dr. Gavin and Melisa Billingsley in our clinic for monitoring for cardiotoxicity related to chemotherapy.        She initially was diagnosed with breast cancer that was ER/CT positive 2006 and underwent a lumpectomy.  She had CHOP therapy and adjuvant radiation at that time.  She was diagnosed with recurrent biologic lead different breast cancer in the same breast in 2017.  She had evidence of metastatic disease in the liver at that time.      She was once again treated with chemotherapy with successful remission.  In 2017, she did demonstrate evidence for mild cardiomyopathy, likely secondary to exposure to Adriamycin therapy in 2006.  She was started on carvedilol and lisinopril, but did develop some angioedema and the lisinopril was subsequently stopped.  She had remained on carvedilol and had stable LV function throughout her chemotherapy.        Unfortunately, she had evidence of recurrence of metastatic disease in 2019.  She had some changes to her chemotherapy agents, but had remained stable with her cardiomyopathy.  Earlier this month, she developed bilateral pulmonary embolism and was hospitalized and placed on Xarelto.  A limited echocardiogram suggested probably stable LV systolic function, but she had significant RV dysfunction related to the pulmonary embolism.  The RV cavity was moderately to severely dilated with moderately reduced function.  Her chemo had been interrupted and she also had a reduction in her carvedilol dose because of low blood pressures.        Since discharge, she has been doing well.  She is not experiencing any difficulty breathing.  She is very concerned about when she may be able to resume her chemotherapy.        She does continue to monitor her blood  pressures at home.  She had previously been on 25 mg of carvedilol twice daily and is down to 6.25 mg twice daily, again because of the low blood pressures during her hospitalization.  She reported her blood pressure at 128/74 and a pulse rate of 86 today.        According to Dr. Lua's last note and per patient history, she had been on Herceptin every 3 weeks and paclitaxel.  She had completed 10 of 12 cycles.      IMPRESSION:   In summary, Ms. Guzman is a very pleasant 65-year-old female with recurrent metastatic right-sided breast cancer, recent acute bilateral pulmonary embolism with significant RV dysfunction, previous mild nonischemic cardiomyopathy secondary to previous Adriamycin exposure.  Herceptin and paclitaxel have been interrupted because of the PE and RV dysfunction.  She was discharged on 02/10 from the hospital.        She has been doing well from a cardiac perspective.  I would expect that she will most likely have full recovery of right heart function with treatment for her pulmonary embolism.  I would recommend we wait about a month from the time of her diagnosis before repeating a limited echo to reassess RV function.  If this has recovered at that time, she may resume chemotherapy.  I would also like her to continue to monitor her blood pressures.  Until we see her right heart recover, I would not titrate her carvedilol back up unless she starts to have difficulty with high blood pressures.        She will contact us through [employer] if her blood pressures start to increase.  Otherwise, I will plan to see her back in about 2 weeks with a limited echocardiogram.        Please feel free to contact me with any questions you have in regards to her care.      cc:      Max Moreno MD    [facility]   [address] [place] [address]       Zain Lua MD    [facility]   [address]   [place] [address]         DELTA MORGAN,               D: 2021   T: 2021   MT: ROOSEVELT      Name:     KIMMIE PRICE   MRN:      [id number]        Account:      [id number]   :      1955           Service Date: 2021      Document: [id number]        Thank you for allowing me to participate in the care of your patient.    Sincerely,     Ansley Geiger DO     [facility]

## 2021-02-19 NOTE — PROGRESS NOTES
Lorna is a 65 year old who is being evaluated via a billable video visit.     Review Of Systems  Skin: negative  Eyes: negative  Ears/Nose/Throat: negative  Respiratory: Dyspnea on exertion-   Cardiovascular: negative  Gastrointestinal: negative  Genitourinary: negative  Musculoskeletal: negative  Neurologic: negative  Psychiatric: negative  Hematologic/Lymphatic/Immunologic: allergies  Endocrine: negative  General:  no apparent distress, normal body habitus, sitting upright.  ENT/Mouth:  membranes moist, no nasal discharge.  Normal head shape, no apparent injury or laceration.  Eyes:  no scleral icterus, normal conjunctivae.  No observed jaundice.  Neck:  no apparent neck swelling.   Chest/Lungs:  No breathing difficulty while speaking.  No audible wheezing.  No cough during conversation.  Cardiovascular:  No obviously elevated jugular venous pressure  Skin:  no xanthelasma.  No facial lacerations.  Neurologic:  Normal arm motion bilateral, no tremors.    Psychiatric:  Alert and oriented x3, calm demeanor    The rest of the comprehensive physical examination is deferred due to public health emergency video visit restrictions.    Patient reported vitals:  BP:128/74  Heart rate:86  Weight: 139  seven days ago    Kenyetta RAM     How would you like to obtain your AVS? Mail a copy  If the video visit is dropped, the invitation should be resent by: Text to cell phone: [phone]  Will anyone else be joining your video visit? No      Video Start Time: 10:51a  Video-Visit Details    Type of service:  Video Visit    Video End Time:11:39a    Originating Location (pt. Location): Home    Distant Location (provider location):  [facility]     Platform used for Video Visit: Marek

## 2021-03-02 ENCOUNTER — MYC MEDICAL ADVICE (OUTPATIENT)
Dept: CARDIOLOGY | Facility: CLINIC | Age: 66
End: 2021-03-02

## 2021-03-02 DIAGNOSIS — I42.9 SECONDARY CARDIOMYOPATHY (H): ICD-10-CM

## 2021-03-02 RX ORDER — CARVEDILOL 6.25 MG/1
6.25 TABLET ORAL 2 TIMES DAILY WITH MEALS
Qty: 180 TABLET | Refills: 3 | Status: SHIPPED | OUTPATIENT
Start: 2021-03-02 | End: 2021-04-28

## 2021-03-11 ENCOUNTER — HOSPITAL ENCOUNTER (OUTPATIENT)
Dept: CARDIOLOGY | Facility: CLINIC | Age: 66
Discharge: HOME | End: 2021-03-11
Attending: INTERNAL MEDICINE | Admitting: INTERNAL MEDICINE
Payer: MEDICARE

## 2021-03-11 DIAGNOSIS — C50.911 MALIGNANT NEOPLASM OF RIGHT FEMALE BREAST, UNSPECIFIED ESTROGEN RECEPTOR STATUS, UNSPECIFIED SITE OF BREAST (H): ICD-10-CM

## 2021-03-11 DIAGNOSIS — I42.9 SECONDARY CARDIOMYOPATHY (H): ICD-10-CM

## 2021-03-11 DIAGNOSIS — I26.02 ACUTE SADDLE PULMONARY EMBOLISM WITH ACUTE COR PULMONALE (H): ICD-10-CM

## 2021-03-11 PROCEDURE — 93321 DOPPLER ECHO F-UP/LMTD STD: CPT | Mod: 26 | Performed by: INTERNAL MEDICINE

## 2021-03-11 PROCEDURE — 93308 TTE F-UP OR LMTD: CPT | Mod: 26 | Performed by: INTERNAL MEDICINE

## 2021-03-11 PROCEDURE — 93325 DOPPLER ECHO COLOR FLOW MAPG: CPT

## 2021-03-11 PROCEDURE — 93325 DOPPLER ECHO COLOR FLOW MAPG: CPT | Mod: 26 | Performed by: INTERNAL MEDICINE

## 2021-03-12 ENCOUNTER — VIRTUAL VISIT (OUTPATIENT)
Dept: CARDIOLOGY | Facility: CLINIC | Age: 66
End: 2021-03-12
Payer: MEDICARE

## 2021-03-12 DIAGNOSIS — I42.9 SECONDARY CARDIOMYOPATHY (H): ICD-10-CM

## 2021-03-12 DIAGNOSIS — C50.911 MALIGNANT NEOPLASM OF RIGHT FEMALE BREAST, UNSPECIFIED ESTROGEN RECEPTOR STATUS, UNSPECIFIED SITE OF BREAST (H): Primary | ICD-10-CM

## 2021-03-12 DIAGNOSIS — I26.02 ACUTE SADDLE PULMONARY EMBOLISM WITH ACUTE COR PULMONALE (H): Primary | ICD-10-CM

## 2021-03-12 DIAGNOSIS — C50.911 MALIGNANT NEOPLASM OF RIGHT FEMALE BREAST, UNSPECIFIED ESTROGEN RECEPTOR STATUS, UNSPECIFIED SITE OF BREAST (H): ICD-10-CM

## 2021-03-12 PROCEDURE — 99215 OFFICE O/P EST HI 40 MIN: CPT | Mod: 95 | Performed by: PHYSICIAN ASSISTANT

## 2021-03-12 NOTE — LETTER
3/12/2021    Zain Lua MD  [facility] [address]  [facility] [address]    RE: Lorna Guzman       Dear Colleague,    I had the pleasure of seeing Lorna Guzman in the [facility].    DOS: 3/12/2021    Lorna Guzman is a 65 year old female who is being evaluated via a billable video visit.      How would you like to obtain your AVS? MyChart  If the video visit is dropped, the invitation should be resent by: Text to cell phone: [phone]  Will anyone else be joining your video visit? No    Vitals - Patient Reported  Systolic (Patient Reported): 119  Diastolic (Patient Reported): 72  Weight (Patient Reported): 60.8 kg (134 lb)  SpO2 (Patient Reported): 97  Pulse (Patient Reported): 83    Review Of Systems  Skin: NEGATIVE  Eyes:Ears/Nose/Throat: contacts  Respiratory: SOB with exertion  Cardiovascular:NEGATIVE  Gastrointestinal: NEGATIVE   Musculoskeletal: NEGATIVE  Neurologic: NEGATIVE  Hematologic/Lymphatic/Immunologic: NEGATIVE  Endocrine:  NEGATIVE  Rocío Ren MA    Telephone number of patient:[phone]    Video-Visit Details    Type of service:  Video Visit    Video Start Time: 10:48 AM  Video End Time: 11:02 AM    Originating Location (pt. Location): Home    Distant Location (provider location):  [facility] [place]     Platform used for Video Visit: DoxFavbuy    Subjective   Lorna Guzman is a pleasant 65 year old female who is being evaluated via a billable video visit in order to minimize the possibility of exposure due to the current COVID-19 pandemic. This is a 1 month follow up.    Lorna is followed in our clinic from a Cardio-Oncology perspective due to a history of a chemo-induced cardiomyopathy and metastatic breast carcinoma with ongoing Herceptin use.  She was initially diagnosed with breast cancer in 2006, at which time she underwent a lumpectomy, CHOP  chemotherapy and adjuvant radiation.  She was started on tamoxifen and after 2 years was transitioned to Aromasin.  She completed this treatment in 07/2013.  Unfortunately, in late 2017 she was again diagnosed with an invasive ductal carcinoma of the right breast.  She was also found to have evidence of metastatic disease (in the liver and a lymph node) at that time.  As part of her workup, she underwent an echocardiogram which showed mildly reduced LV systolic function with an EF of approximately 47%.  It was at that time that she was referred to Cardiology.  She had a stress cardiac MRI which showed normal LV size with mild to moderately reduced function and an LVEF of 43%.  She was also noted to have mitral valve prolapse and mild mitral regurgitation.  Stress imaging did not show any evidence of ischemia.  She was started on carvedilol and lisinopril at that time.  Her cancer was treated with 4 cycles of TC chemotherapy, and she was started on Herceptin on an every 3 week basis, which was to continue lifelong.  Fortunately, from a cardiac standpoint, followup cardiac MRIs and echocardiography have shown slight improvement/stability of her LVEF with ongoing therapy.  In the late summer/early fall of 2018 she did develop severe facial swelling and it was felt that this was related to angioedema secondary to her lisinopril, which was discontinued.  At that time, we decided to continue with carvedilol alone unless we saw a decline in her LV systolic function. Spring 2019 she was noted to have a growing R breast mass, she was started on fulverstrant and ribociclib. She had a R mastectomy 10/2019 and had radiation in March 2020.  Late 2020 she was found to have more metastatic disease in her chest/lungs. Fulverstrant and ribociclib stopped and she has started chemo with paclitaxel, pertuzumab and trastuzumab.    Unfortunately she developed a PE/DVT in Feb 2020, she was briefly hospitalized. A limited echocardiogram  suggested probably stable LV systolic function, but she had significant RV dysfunction related to the pulmonary embolism.  The RV cavity was moderately to severely dilated with moderately reduced function.  Her chemo was put on hold and she also had a reduction in her carvedilol dose because of low blood pressures. She saw Dr Geiger in mid Feb whom recommended a follow up echo and OV in 1 month.    Today she is feeling well. Her breathing is back to baseline. No CV concerns. BP at home is in the 120-130/70s.    Objective     Physical Exam  GENERAL: Healthy, alert and no distress  EYES: Eyes grossly normal to inspection.  No discharge or erythema, or obvious scleral/conjunctival abnormalities.  RESP: No audible wheeze, cough, or visible cyanosis.  No visible retractions or increased work of breathing.    PSYCH: Mentation appears normal, affect normal/bright, judgement and insight intact, normal speech and appearance well-groomed.    Echo 3/11/2021:  LVEF 45-50%, stable from Feb. GLS-17%. RV is normal size, RV systolic function is borderline reduced.    Assessment/Plan:  Ms. Guzman is a very pleasant 65-year-old female with recurrent metastatic right-sided breast cancer, mild nonischemic cardiomyopathy secondary to previous Adriamycin exposure and recent acute bilateral pulmonary embolism with significant RV dysfunction.  Herceptin and paclitaxel have been interrupted because of the PE and RV dysfunction.    Fortunately her echo shows significant improvement in her RV dysfunction. LV function probably stable. Echo results were reviewed with Dr Geiger and we feel she is stable from a cardiac standpoint to resume chemo as recommended by her onc team. Will plan for close/continued monitoring of her cardiomyopathy with echo.    Coreg was reduced during her hospitalization due to hypotension. BP at home looks good. Will continue with 6.25mg BID. If any changes on echo can consider increasing the dose again.     FUTURE  APPOINTMENTS:       - Follow-up visit in late April with me with an echo prior    40 minutes spent on the date of the encounter doing chart review, review of test results, patient visit and documentation     An AVS will be provided to the patient.      Thank you for allowing me to participate in the care of this pleasant patient.  Please do not hesitate to contact us with further questions or concerns.     Melisa Billingsley PA-C  [facility]    cc:   Ansley Geiger DO  [address] [place] [id number]

## 2021-03-12 NOTE — PROGRESS NOTES
DOS: 3/12/2021    Lorna Guzman is a 65 year old female who is being evaluated via a billable video visit.      How would you like to obtain your AVS? MyChart  If the video visit is dropped, the invitation should be resent by: Text to cell phone: [phone]  Will anyone else be joining your video visit? No    Vitals - Patient Reported  Systolic (Patient Reported): 119  Diastolic (Patient Reported): 72  Weight (Patient Reported): 60.8 kg (134 lb)  SpO2 (Patient Reported): 97  Pulse (Patient Reported): 83    Review Of Systems  Skin: NEGATIVE  Eyes:Ears/Nose/Throat: contacts  Respiratory: SOB with exertion  Cardiovascular:NEGATIVE  Gastrointestinal: NEGATIVE   Musculoskeletal: NEGATIVE  Neurologic: NEGATIVE  Hematologic/Lymphatic/Immunologic: NEGATIVE  Endocrine:  NEGATIVE  Rocío Ren MA    Telephone number of patient:[phone]    Video-Visit Details    Type of service:  Video Visit    Video Start Time: 10:48 AM  Video End Time: 11:02 AM    Originating Location (pt. Location): Home    Distant Location (provider location):  [facility] HEART [facility]     Platform used for Video Visit: Hammerhead Navigation   Lorna Guzman is a pleasant 65 year old female who is being evaluated via a billable video visit in order to minimize the possibility of exposure due to the current COVID-19 pandemic. This is a 1 month follow up.    Lorna is followed in our clinic from a Cardio-Oncology perspective due to a history of a chemo-induced cardiomyopathy and metastatic breast carcinoma with ongoing Herceptin use.  She was initially diagnosed with breast cancer in 2006, at which time she underwent a lumpectomy, CHOP chemotherapy and adjuvant radiation.  She was started on tamoxifen and after 2 years was transitioned to Aromasin.  She completed this treatment in 07/2013.  Unfortunately, in late 2017 she was again diagnosed with an invasive ductal carcinoma of the right breast.  She was also found to have  evidence of metastatic disease (in the liver and a lymph node) at that time.  As part of her workup, she underwent an echocardiogram which showed mildly reduced LV systolic function with an EF of approximately 47%.  It was at that time that she was referred to Cardiology.  She had a stress cardiac MRI which showed normal LV size with mild to moderately reduced function and an LVEF of 43%.  She was also noted to have mitral valve prolapse and mild mitral regurgitation.  Stress imaging did not show any evidence of ischemia.  She was started on carvedilol and lisinopril at that time.  Her cancer was treated with 4 cycles of TC chemotherapy, and she was started on Herceptin on an every 3 week basis, which was to continue lifelong.  Fortunately, from a cardiac standpoint, followup cardiac MRIs and echocardiography have shown slight improvement/stability of her LVEF with ongoing therapy.  In the late summer/early fall of 2018 she did develop severe facial swelling and it was felt that this was related to angioedema secondary to her lisinopril, which was discontinued.  At that time, we decided to continue with carvedilol alone unless we saw a decline in her LV systolic function. Spring 2019 she was noted to have a growing R breast mass, she was started on fulverstrant and ribociclib. She had a R mastectomy 10/2019 and had radiation in March 2020.  Late 2020 she was found to have more metastatic disease in her chest/lungs. Fulverstrant and ribociclib stopped and she has started chemo with paclitaxel, pertuzumab and trastuzumab.    Unfortunately she developed a PE/DVT in Feb 2020, she was briefly hospitalized. A limited echocardiogram suggested probably stable LV systolic function, but she had significant RV dysfunction related to the pulmonary embolism.  The RV cavity was moderately to severely dilated with moderately reduced function.  Her chemo was put on hold and she also had a reduction in her carvedilol dose because of  low blood pressures. She saw Dr Geiger in mid Feb whom recommended a follow up echo and OV in 1 month.    Today she is feeling well. Her breathing is back to baseline. No CV concerns. BP at home is in the 120-130/70s.    Objective     Physical Exam  GENERAL: Healthy, alert and no distress  EYES: Eyes grossly normal to inspection.  No discharge or erythema, or obvious scleral/conjunctival abnormalities.  RESP: No audible wheeze, cough, or visible cyanosis.  No visible retractions or increased work of breathing.    PSYCH: Mentation appears normal, affect normal/bright, judgement and insight intact, normal speech and appearance well-groomed.    Echo 3/11/2021:  LVEF 45-50%, stable from Feb. GLS-17%. RV is normal size, RV systolic function is borderline reduced.    Assessment/Plan:  Ms. Guzman is a very pleasant 65-year-old female with recurrent metastatic right-sided breast cancer, mild nonischemic cardiomyopathy secondary to previous Adriamycin exposure and recent acute bilateral pulmonary embolism with significant RV dysfunction.  Herceptin and paclitaxel have been interrupted because of the PE and RV dysfunction.    Fortunately her echo shows significant improvement in her RV dysfunction. LV function probably stable. Echo results were reviewed with Dr Geiger and we feel she is stable from a cardiac standpoint to resume chemo as recommended by her onc team. Will plan for close/continued monitoring of her cardiomyopathy with echo.    Coreg was reduced during her hospitalization due to hypotension. BP at home looks good. Will continue with 6.25mg BID. If any changes on echo can consider increasing the dose again.     FUTURE APPOINTMENTS:       - Follow-up visit in late April with me with an echo prior    40 minutes spent on the date of the encounter doing chart review, review of test results, patient visit and documentation     An AVS will be provided to the patient.      Thank you for allowing me to participate in  the care of this pleasant patient.  Please do not hesitate to contact us with further questions or concerns.     HAL Avelar [facility] HEART [facility]

## 2021-03-16 ENCOUNTER — IMMUNIZATION (OUTPATIENT)
Dept: NURSING | Facility: CLINIC | Age: 66
End: 2021-03-16
Payer: MEDICARE

## 2021-03-16 PROCEDURE — 91300 PR COVID VAC PFIZER DIL RECON 30 MCG/0.3 ML IM: CPT

## 2021-03-16 PROCEDURE — 0001A PR COVID VAC PFIZER DIL RECON 30 MCG/0.3 ML IM: CPT

## 2021-04-06 ENCOUNTER — IMMUNIZATION (OUTPATIENT)
Dept: NURSING | Facility: CLINIC | Age: 66
End: 2021-04-06
Attending: FAMILY MEDICINE
Payer: MEDICARE

## 2021-04-06 PROCEDURE — 0002A PR COVID VAC PFIZER DIL RECON 30 MCG/0.3 ML IM: CPT

## 2021-04-06 PROCEDURE — 91300 PR COVID VAC PFIZER DIL RECON 30 MCG/0.3 ML IM: CPT

## 2021-04-26 ENCOUNTER — TRANSFERRED RECORDS (OUTPATIENT)
Dept: HEALTH INFORMATION MANAGEMENT | Facility: CLINIC | Age: 66
End: 2021-04-26

## 2021-04-27 ENCOUNTER — HOSPITAL ENCOUNTER (OUTPATIENT)
Dept: CARDIOLOGY | Facility: CLINIC | Age: 66
Discharge: HOME | End: 2021-04-27
Attending: PHYSICIAN ASSISTANT | Admitting: PHYSICIAN ASSISTANT
Payer: MEDICARE

## 2021-04-27 DIAGNOSIS — C50.911 MALIGNANT NEOPLASM OF RIGHT FEMALE BREAST, UNSPECIFIED ESTROGEN RECEPTOR STATUS, UNSPECIFIED SITE OF BREAST (H): ICD-10-CM

## 2021-04-27 DIAGNOSIS — I42.9 SECONDARY CARDIOMYOPATHY (H): ICD-10-CM

## 2021-04-27 PROCEDURE — 93308 TTE F-UP OR LMTD: CPT | Mod: 26 | Performed by: INTERNAL MEDICINE

## 2021-04-27 PROCEDURE — 93325 DOPPLER ECHO COLOR FLOW MAPG: CPT | Mod: 26 | Performed by: INTERNAL MEDICINE

## 2021-04-27 PROCEDURE — 93321 DOPPLER ECHO F-UP/LMTD STD: CPT

## 2021-04-27 PROCEDURE — 93321 DOPPLER ECHO F-UP/LMTD STD: CPT | Mod: 26 | Performed by: INTERNAL MEDICINE

## 2021-04-28 ENCOUNTER — VIRTUAL VISIT (OUTPATIENT)
Dept: CARDIOLOGY | Facility: CLINIC | Age: 66
End: 2021-04-28
Payer: MEDICARE

## 2021-04-28 DIAGNOSIS — I42.9 SECONDARY CARDIOMYOPATHY (H): Primary | ICD-10-CM

## 2021-04-28 DIAGNOSIS — C50.911 MALIGNANT NEOPLASM OF RIGHT FEMALE BREAST, UNSPECIFIED ESTROGEN RECEPTOR STATUS, UNSPECIFIED SITE OF BREAST (H): ICD-10-CM

## 2021-04-28 PROCEDURE — 99214 OFFICE O/P EST MOD 30 MIN: CPT | Mod: 95 | Performed by: PHYSICIAN ASSISTANT

## 2021-04-28 RX ORDER — CARVEDILOL 12.5 MG/1
12.5 TABLET ORAL 2 TIMES DAILY WITH MEALS
Qty: 180 TABLET | Refills: 3 | Status: SHIPPED | OUTPATIENT
Start: 2021-04-28

## 2021-04-28 NOTE — LETTER
4/28/2021    Zain Lua MD  [facility] [address]  [facility] [address]    RE: Lorna Guzman       Dear Colleague,    I had the pleasure of seeing Lorna Guzman in the [facility].    4/28/2021      Lorna is a 65 year old who is being evaluated via a billable video visit.      How would you like to obtain your AVS? Mail a copy  If the video visit is dropped, the invitation should be resent by: Text to cell phone: [phone]  Will anyone else be joining your video visit? No      Review Of Systems  Skin: negative  Eyes: negative  Ears/Nose/Throat: negative  Respiratory: No shortness of breath, dyspnea on exertion, cough, or hemoptysis  Cardiovascular: negative  Gastrointestinal: negative  Genitourinary: negative  Musculoskeletal: negative  Neurologic: negative and positive for negative  Psychiatric: negative  Hematologic/Lymphatic/Immunologic: negative  Endocrine: negative    Patient reported vitals:  BP: 121/83   Heart rate: 72   Weight: 133 LBS   O2 97%  Krissy Roa CMA      Video-Visit Details  Video Start Time: 9:07 AM   Video End Time:9:22 AM    Originating Location (pt. Location): Home    Distant Location (provider location):  [facility]     Platform used for Video Visit: Cute Attack    Subjective   Lorna Guzman is a pleasant 65 year old female who is being evaluated via a billable video visit in order to minimize the possibility of exposure due to the current COVID-19 pandemic. This is a 1 month follow up.    Lorna is followed in our clinic from a Cardio-Oncology perspective due to a history of a chemo-induced cardiomyopathy and metastatic breast carcinoma with ongoing Herceptin use.  She was initially diagnosed with breast cancer in 2006, at which time she underwent a lumpectomy, CHOP chemotherapy and adjuvant radiation.  She was started on tamoxifen and  after 2 years was transitioned to Aromasin.  She completed this treatment in 07/2013.  Unfortunately, in late 2017 she was again diagnosed with an invasive ductal carcinoma of the right breast.  She was also found to have evidence of metastatic disease (in the liver and a lymph node) at that time.  As part of her workup, she underwent an echocardiogram which showed mildly reduced LV systolic function with an EF of approximately 47%.  It was at that time that she was referred to Cardiology.  She had a stress cardiac MRI which showed normal LV size with mild to moderately reduced function and an LVEF of 43%.  She was also noted to have mitral valve prolapse and mild mitral regurgitation.  Stress imaging did not show any evidence of ischemia.  She was started on carvedilol and lisinopril at that time.  Her cancer was treated with 4 cycles of TC chemotherapy, and she was started on Herceptin on an every 3 week basis, which is to continue lifelong.  Fortunately, from a cardiac standpoint, followup cardiac MRIs and echocardiography have shown slight improvement/stability of her LVEF with ongoing therapy.  In the late summer/early fall of 2018 she did develop severe facial swelling and it was felt that this was related to angioedema secondary to her lisinopril, which was discontinued.  At that time, we decided to continue with carvedilol alone unless we saw a decline in her LV systolic function. Spring 2019 she was noted to have a growing R breast mass, she was started on fulverstrant and ribociclib. She had a R mastectomy 10/2019 and had radiation in March 2020.  Late 2020 she was found to have more metastatic disease in her chest/lungs. Fulverstrant and ribociclib stopped and she has started chemo with paclitaxel, pertuzumab and trastuzumab.    Unfortunately she developed a PE/DVT in Feb 2020, she was briefly hospitalized. A limited echocardiogram suggested probably stable LV systolic function, but she had significant RV  dysfunction related to the pulmonary embolism.  The RV cavity was moderately to severely dilated with moderately reduced function.  Her chemo was put on hold and she also had a reduction in her carvedilol dose because of low blood pressures. Echo in March showed LVEF 45-50%, stable from Feb. GLS-17%. RV is normal size, RV systolic function is borderline reduced. She was given the ok to resume her cancer meds.     She has been feeling well, her energy has increased since our last visit and her prior GRAHAM has essentially resolved. BP has been well controlled at home.     Objective   Physical Exam  GENERAL: Healthy, alert and no distress  EYES: Eyes grossly normal to inspection.  No discharge or erythema, or obvious scleral/conjunctival abnormalities.  RESP: No audible wheeze, cough, or visible cyanosis.  No visible retractions or increased work of breathing.    PSYCH: Mentation appears normal, affect normal/bright, judgement and insight intact, normal speech and appearance well-groomed.    Echo 4/27/2021:  Left ventricular systolic function is mild to moderately reduced. LVEF 41%  based on biplane 2D tracing.There is mild-moderate global hypokinesia of the left ventricle.  Global peak LV longitudinal strain is averaged at -15.4%. This suggests abnormal strain (normal <-18%).  The right ventricular systolic function is normal.  There is mild (1+) mitral regurgitation.  Echo dated 03/11/2021 LVEF 45-50%, GLS -17%.    Assessment/Plan:  Ms. Guzman is a very pleasant 65-year-old female with recurrent metastatic right-sided breast cancer, mild nonischemic cardiomyopathy secondary to previous Adriamycin exposure and recent acute bilateral pulmonary embolism with significant RV dysfunction which subsequently resolved.  Herceptin, paclitaxel and pertuzumab were interrupted but then resumed (she may be off the paclitaxel, unfortunately I do not have her most recent Onc notes). Her echo from yesterday does show a decline in the  GLS, LVEF is 41%.    Unfortunately Dr Geiger is out of the office today, but I will ask her to review the echo tomorrow when she returns. For the time being I instructed Lorna to increase her coreg back to 12.5mg BID. We may also want to trial an ARB but I will discuss this with Dr Geiger as well. We will contact Lorna in the next 1-2 days with further recommendations regarding timing of her next echo vs repeating a CMR and any recommendation regarding changes in medical therapy.     FUTURE APPOINTMENTS:       - TBD    40 minutes spent on the date of the encounter doing chart review, review of test results, patient visit and documentation     An AVS will be provided to the patient.      Thank you for allowing me to participate in the care of this pleasant patient.  Please do not hesitate to contact us with further questions or concerns.       HAL Avelar [facility]IN[facility]    ADDENDUM: D/W Dr Geiger. She reviewed the echo and agreed with increasing the coreg. Will plan a repeat echo in 1 month. No need to stop her onc therapy at this time. Pt called with an update.  Melisa Billingsley PA-C    Thank you for allowing me to participate in the care of your patient.      Sincerely,     HAL Avelar [facility]    cc:   No referring provider defined for this encounter.

## 2021-04-28 NOTE — PROGRESS NOTES
4/28/2021      Lorna is a 65 year old who is being evaluated via a billable video visit.      How would you like to obtain your AVS? Mail a copy  If the video visit is dropped, the invitation should be resent by: Text to cell phone: [phone]  Will anyone else be joining your video visit? No      Review Of Systems  Skin: negative  Eyes: negative  Ears/Nose/Throat: negative  Respiratory: No shortness of breath, dyspnea on exertion, cough, or hemoptysis  Cardiovascular: negative  Gastrointestinal: negative  Genitourinary: negative  Musculoskeletal: negative  Neurologic: negative and positive for negative  Psychiatric: negative  Hematologic/Lymphatic/Immunologic: negative  Endocrine: negative    Patient reported vitals:  BP: 121/83   Heart rate: 72   Weight: 133 LBS   O2 97%  Krissy Roa CMA      Video-Visit Details  Video Start Time: 9:07 AM   Video End Time:9:22 AM    Originating Location (pt. Location): Home    Distant Location (provider location):  [facility]     Platform used for Video Visit: Marek Maxwell   Lorna Guzman is a pleasant 65 year old female who is being evaluated via a billable video visit in order to minimize the possibility of exposure due to the current COVID-19 pandemic. This is a 1 month follow up.    Lorna is followed in our clinic from a Cardio-Oncology perspective due to a history of a chemo-induced cardiomyopathy and metastatic breast carcinoma with ongoing Herceptin use.  She was initially diagnosed with breast cancer in 2006, at which time she underwent a lumpectomy, CHOP chemotherapy and adjuvant radiation.  She was started on tamoxifen and after 2 years was transitioned to Aromasin.  She completed this treatment in 07/2013.  Unfortunately, in late 2017 she was again diagnosed with an invasive ductal carcinoma of the right breast.  She was also found to have evidence of metastatic disease (in the liver and a lymph node) at  that time.  As part of her workup, she underwent an echocardiogram which showed mildly reduced LV systolic function with an EF of approximately 47%.  It was at that time that she was referred to Cardiology.  She had a stress cardiac MRI which showed normal LV size with mild to moderately reduced function and an LVEF of 43%.  She was also noted to have mitral valve prolapse and mild mitral regurgitation.  Stress imaging did not show any evidence of ischemia.  She was started on carvedilol and lisinopril at that time.  Her cancer was treated with 4 cycles of TC chemotherapy, and she was started on Herceptin on an every 3 week basis, which is to continue lifelong.  Fortunately, from a cardiac standpoint, followup cardiac MRIs and echocardiography have shown slight improvement/stability of her LVEF with ongoing therapy.  In the late summer/early fall of 2018 she did develop severe facial swelling and it was felt that this was related to angioedema secondary to her lisinopril, which was discontinued.  At that time, we decided to continue with carvedilol alone unless we saw a decline in her LV systolic function. Spring 2019 she was noted to have a growing R breast mass, she was started on fulverstrant and ribociclib. She had a R mastectomy 10/2019 and had radiation in March 2020.  Late 2020 she was found to have more metastatic disease in her chest/lungs. Fulverstrant and ribociclib stopped and she has started chemo with paclitaxel, pertuzumab and trastuzumab.    Unfortunately she developed a PE/DVT in Feb 2020, she was briefly hospitalized. A limited echocardiogram suggested probably stable LV systolic function, but she had significant RV dysfunction related to the pulmonary embolism.  The RV cavity was moderately to severely dilated with moderately reduced function.  Her chemo was put on hold and she also had a reduction in her carvedilol dose because of low blood pressures. Echo in March showed LVEF 45-50%, stable from  Feb. GLS-17%. RV is normal size, RV systolic function is borderline reduced. She was given the ok to resume her cancer meds.     She has been feeling well, her energy has increased since our last visit and her prior GRAHAM has essentially resolved. BP has been well controlled at home.     Objective   Physical Exam  GENERAL: Healthy, alert and no distress  EYES: Eyes grossly normal to inspection.  No discharge or erythema, or obvious scleral/conjunctival abnormalities.  RESP: No audible wheeze, cough, or visible cyanosis.  No visible retractions or increased work of breathing.    PSYCH: Mentation appears normal, affect normal/bright, judgement and insight intact, normal speech and appearance well-groomed.    Echo 4/27/2021:  Left ventricular systolic function is mild to moderately reduced. LVEF 41%  based on biplane 2D tracing.There is mild-moderate global hypokinesia of the left ventricle.  Global peak LV longitudinal strain is averaged at -15.4%. This suggests abnormal strain (normal <-18%).  The right ventricular systolic function is normal.  There is mild (1+) mitral regurgitation.  Echo dated 03/11/2021 LVEF 45-50%, GLS -17%.    Assessment/Plan:  Ms. Guzman is a very pleasant 65-year-old female with recurrent metastatic right-sided breast cancer, mild nonischemic cardiomyopathy secondary to previous Adriamycin exposure and recent acute bilateral pulmonary embolism with significant RV dysfunction which subsequently resolved.  Herceptin, paclitaxel and pertuzumab were interrupted but then resumed (she may be off the paclitaxel, unfortunately I do not have her most recent Onc notes). Her echo from yesterday does show a decline in the GLS, LVEF is 41%.    Unfortunately Dr Geiger is out of the office today, but I will ask her to review the echo tomorrow when she returns. For the time being I instructed Lorna to increase her coreg back to 12.5mg BID. We may also want to trial an ARB but I will discuss this with   Fely as well. We will contact Olrna in the next 1-2 days with further recommendations regarding timing of her next echo vs repeating a CMR and any recommendation regarding changes in medical therapy.     FUTURE APPOINTMENTS:       - TBD    40 minutes spent on the date of the encounter doing chart review, review of test results, patient visit and documentation     An AVS will be provided to the patient.      Thank you for allowing me to participate in the care of this pleasant patient.  Please do not hesitate to contact us with further questions or concerns.       Melisa Billingsley PA-C  [facility] HEART CLINIC    ADDENDUM: D/W Dr Geiger. She reviewed the echo and agreed with increasing the coreg. Will plan a repeat echo in 1 month. No need to stop her onc therapy at this time. Pt called with an update.  Melisa Billingsley PA-C

## 2021-05-09 ENCOUNTER — HEALTH MAINTENANCE LETTER (OUTPATIENT)
Age: 66
End: 2021-05-09

## 2021-06-15 ENCOUNTER — HOSPITAL ENCOUNTER (OUTPATIENT)
Dept: CARDIOLOGY | Facility: CLINIC | Age: 66
Discharge: HOME | End: 2021-06-15
Attending: PHYSICIAN ASSISTANT | Admitting: PHYSICIAN ASSISTANT
Payer: MEDICARE

## 2021-06-15 DIAGNOSIS — I42.9 SECONDARY CARDIOMYOPATHY (H): ICD-10-CM

## 2021-06-15 PROCEDURE — 93321 DOPPLER ECHO F-UP/LMTD STD: CPT | Mod: 26 | Performed by: INTERNAL MEDICINE

## 2021-06-15 PROCEDURE — 93308 TTE F-UP OR LMTD: CPT | Mod: 26 | Performed by: INTERNAL MEDICINE

## 2021-06-15 PROCEDURE — 93325 DOPPLER ECHO COLOR FLOW MAPG: CPT | Mod: 26 | Performed by: INTERNAL MEDICINE

## 2021-06-15 PROCEDURE — 93325 DOPPLER ECHO COLOR FLOW MAPG: CPT

## 2021-06-23 ENCOUNTER — OFFICE VISIT (OUTPATIENT)
Dept: CARDIOLOGY | Facility: CLINIC | Age: 66
End: 2021-06-23
Attending: PHYSICIAN ASSISTANT
Payer: MEDICARE

## 2021-06-23 VITALS
HEIGHT: 69 IN | OXYGEN SATURATION: 97 % | SYSTOLIC BLOOD PRESSURE: 118 MMHG | WEIGHT: 129.5 LBS | BODY MASS INDEX: 19.18 KG/M2 | HEART RATE: 89 BPM | DIASTOLIC BLOOD PRESSURE: 68 MMHG

## 2021-06-23 DIAGNOSIS — I42.9 SECONDARY CARDIOMYOPATHY (H): Primary | ICD-10-CM

## 2021-06-23 DIAGNOSIS — Z86.711 HISTORY OF PULMONARY EMBOLISM: ICD-10-CM

## 2021-06-23 PROCEDURE — 99214 OFFICE O/P EST MOD 30 MIN: CPT | Performed by: PHYSICIAN ASSISTANT

## 2021-06-23 ASSESSMENT — MIFFLIN-ST. JEOR: SCORE: 1201.79

## 2021-06-23 NOTE — PROGRESS NOTES
SERVICE DATE: 6/23/2021     Primary Cardiologist: Dr Geiger    REASON FOR VISIT:  Lorna Guzman presents for a 1 month cardio-onc follow up    HISTORY OF PRESENT ILLNESS:   Lorna is followed in our clinic from a Cardio-Oncology perspective due to a history of a chemo-induced cardiomyopathy and metastatic breast carcinoma with ongoing Herceptin (trastuzumab) use.    She was initially diagnosed with breast cancer in 2006, at which time she underwent a lumpectomy, CHOP chemotherapy and adjuvant radiation.  She was started on tamoxifen and after 2 years was transitioned to Aromasin.  She completed this treatment in 07/2013. Unfortunately, in late 2017 she was again diagnosed with an invasive ductal carcinoma of the right breast.  She was also found to have evidence of metastatic disease (in the liver and a lymph node) at that time.  As part of her workup, she underwent an echocardiogram which showed mildly reduced LV systolic function with an EF of approximately 47%.  It was at that time that she was referred to Cardiology.  She had a stress cardiac MRI which showed normal LV size with mild to moderately reduced function and an LVEF of 43%.  She was also noted to have mitral valve prolapse and mild mitral regurgitation.  Stress imaging did not show any evidence of ischemia.  She was started on carvedilol and lisinopril.  Her cancer was treated with 4 cycles of TC chemotherapy, and she was started on Herceptin on an every 3 week basis, which is to continue lifelong.  Fortunately, from a cardiac standpoint, followup cardiac MRIs and echocardiography have shown slight improvement/stability of her LVEF with ongoing therapy.  In the late summer/early fall of 2018 she did develop severe facial swelling and it was felt that this was related to angioedema secondary to her lisinopril, which was discontinued.  At that time, we decided to continue with carvedilol alone unless we saw a decline in her LV systolic function.  Spring 2019 she was noted to have a growing R breast mass, she was started on fulverstrant and ribociclib. She had a R mastectomy 10/2019 and had radiation in March 2020.  Late 2020 she was found to have more metastatic disease in her chest/lungs. Fulverstrant and ribociclib stopped and she has started chemo with paclitaxel, pertuzumab and trastuzumab.    Unfortunately she developed a PE/DVT in Feb 2021, she was briefly hospitalized. A limited echocardiogram suggested probably stable LV systolic function, but she had significant RV dysfunction related to the pulmonary embolism.  The RV cavity was moderately to severely dilated with moderately reduced function.  Her chemo was put on hold and she also had a reduction in her carvedilol dose because of low blood pressures. Echo in March showed LVEF 45-50%, stable from Feb. GLS-17%. RV was normal size, RV systolic function is borderline reduced. She was given the ok to resume her cancer meds.    April 2021 TTE showed a decline in the GLS -15.4%, LVEF is 41%. Coreg increased to 12.5mg BID. Repeat echo in 1 month.     Since her last visit she has been feeling well. No CV concerns or symptoms. Home SBP typically about 120. Now is on pertuzumab and trastuzumab only. Has a PET scan next month. Has a new grandson this week.    CURRENT MEDICATIONS:   Current Outpatient Medications   Medication Sig Dispense Refill     carvedilol (COREG) 12.5 MG tablet Take 1 tablet (12.5 mg) by mouth 2 times daily (with meals) Increased dose. Please fill. 180 tablet 3     PERTUZUMAB IV Inject into the vein every 21 days        rivaroxaban ANTICOAGULANT (XARELTO ANTICOAGULANT) 20 MG TABS tablet Take 1 tablet (20 mg) by mouth daily (with dinner)       trastuzumab (HERCEPTIN) 150 MG SOLR injection Inject into the vein every 21 days Chemo agent / HERCEPTIN         ALLERGIES:  Allergies   Allergen Reactions     Lisinopril Hives and Swelling     Venlafaxine Hives and Swelling     Possibly allergy  "      ROS:  Skin:  Positive for itching   Eyes:  Positive for glasses;contacts  ENT:  Negative    Respiratory:  Positive for shortness of breath;dyspnea on exertion  Cardiovascular:  Negative    Gastroenterology: Negative    Genitourinary:  Negative    Musculoskeletal:  Negative    Neurologic:  Positive for numbness or tingling of hands  Psychiatric:  Negative    Heme/Lymph/Imm:  Positive for easy bruising  Endocrine:  Negative      PHYSICAL EXAMINATION:    GENERAL:  The patient is a pleasant 66 year old who appears their stated age.  In no apparent distress.  VITAL SIGNS:  /68 (BP Location: Left arm, Patient Position: Sitting, Cuff Size: Adult Regular)   Pulse 89   Ht 1.753 m (5' 9\")   Wt 58.7 kg (129 lb 8 oz)   LMP  (LMP Unknown)   SpO2 97%   BMI 19.12 kg/m      RESPIRATORY:  Breathing is nonlabored.    CARDIAC:  RRR. No murmur  NEUROLOGIC:  Alert and oriented.    DATA REVIEWED:    TTE 6/15/2021  The visual ejection fraction is estimated at 45-50%. Approx 47% by biplane.  GLS -17%  Both the above parameters slightly improved compared with most recent study.  There is mild (1+) mitral regurgitation.  There is mild (1+) tricuspid regurgitation.  There is mild (1+) pulmonic valvular regurgitation.    ASSESSMENT AND PLAN:    66 year old F with metastatic breast CA with complication of recent DVT/PE in Feb 2021 and a chemo-induced CM whom returns for follow up. She is followed closely as she is on life-long herceptin.     Her echo last week shows improvement from last month and is more consistent with what we had seen over the past few years. I recommended she continue her current dose of coreg. Will plan a repeat echo in 3 months and clinic follow up at that time as well.    She is on life-long AC due to DVT/PE in the setting of CA.     Follow up: 3 months with echo prior.  Of course, the patient was encouraged to contact us sooner with questions or concerns.    Melisa Billingsley PA-C    "

## 2021-06-23 NOTE — LETTER
6/23/2021    Zain Lua MD  [facility] [address]  [facility] [address]    RE: Lorna Guzman       Dear Colleague,    I had the pleasure of seeing Lorna Guzman in the [facility].    SERVICE DATE: 6/23/2021     Primary Cardiologist: Dr Geiger    REASON FOR VISIT:  Lorna Guzman presents for a 1 month cardio-onc follow up    HISTORY OF PRESENT ILLNESS:   Lorna is followed in our clinic from a Cardio-Oncology perspective due to a history of a chemo-induced cardiomyopathy and metastatic breast carcinoma with ongoing Herceptin (trastuzumab) use.    She was initially diagnosed with breast cancer in 2006, at which time she underwent a lumpectomy, CHOP chemotherapy and adjuvant radiation.  She was started on tamoxifen and after 2 years was transitioned to Aromasin.  She completed this treatment in 07/2013. Unfortunately, in late 2017 she was again diagnosed with an invasive ductal carcinoma of the right breast.  She was also found to have evidence of metastatic disease (in the liver and a lymph node) at that time.  As part of her workup, she underwent an echocardiogram which showed mildly reduced LV systolic function with an EF of approximately 47%.  It was at that time that she was referred to Cardiology.  She had a stress cardiac MRI which showed normal LV size with mild to moderately reduced function and an LVEF of 43%.  She was also noted to have mitral valve prolapse and mild mitral regurgitation.  Stress imaging did not show any evidence of ischemia.  She was started on carvedilol and lisinopril.  Her cancer was treated with 4 cycles of TC chemotherapy, and she was started on Herceptin on an every 3 week basis, which is to continue lifelong.  Fortunately, from a cardiac standpoint, followup cardiac MRIs and echocardiography have shown slight improvement/stability of her LVEF with ongoing therapy.  In the late  summer/early fall of 2018 she did develop severe facial swelling and it was felt that this was related to angioedema secondary to her lisinopril, which was discontinued.  At that time, we decided to continue with carvedilol alone unless we saw a decline in her LV systolic function. Spring 2019 she was noted to have a growing R breast mass, she was started on fulverstrant and ribociclib. She had a R mastectomy 10/2019 and had radiation in March 2020.  Late 2020 she was found to have more metastatic disease in her chest/lungs. Fulverstrant and ribociclib stopped and she has started chemo with paclitaxel, pertuzumab and trastuzumab.    Unfortunately she developed a PE/DVT in Feb 2021, she was briefly hospitalized. A limited echocardiogram suggested probably stable LV systolic function, but she had significant RV dysfunction related to the pulmonary embolism.  The RV cavity was moderately to severely dilated with moderately reduced function.  Her chemo was put on hold and she also had a reduction in her carvedilol dose because of low blood pressures. Echo in March showed LVEF 45-50%, stable from Feb. GLS-17%. RV was normal size, RV systolic function is borderline reduced. She was given the ok to resume her cancer meds.    April 2021 TTE showed a decline in the GLS -15.4%, LVEF is 41%. Coreg increased to 12.5mg BID. Repeat echo in 1 month.     Since her last visit she has been feeling well. No CV concerns or symptoms. Home SBP typically about 120. Now is on pertuzumab and trastuzumab only. Has a PET scan next month. Has a new grandson this week.    CURRENT MEDICATIONS:   Current Outpatient Medications   Medication Sig Dispense Refill     carvedilol (COREG) 12.5 MG tablet Take 1 tablet (12.5 mg) by mouth 2 times daily (with meals) Increased dose. Please fill. 180 tablet 3     PERTUZUMAB IV Inject into the vein every 21 days        rivaroxaban ANTICOAGULANT (XARELTO ANTICOAGULANT) 20 MG TABS tablet Take 1 tablet (20 mg) by  "mouth daily (with dinner)       trastuzumab (HERCEPTIN) 150 MG SOLR injection Inject into the vein every 21 days Chemo agent / HERCEPTIN         ALLERGIES:  Allergies   Allergen Reactions     Lisinopril Hives and Swelling     Venlafaxine Hives and Swelling     Possibly allergy       ROS:  Skin:  Positive for itching   Eyes:  Positive for glasses;contacts  ENT:  Negative    Respiratory:  Positive for shortness of breath;dyspnea on exertion  Cardiovascular:  Negative    Gastroenterology: Negative    Genitourinary:  Negative    Musculoskeletal:  Negative    Neurologic:  Positive for numbness or tingling of hands  Psychiatric:  Negative    Heme/Lymph/Imm:  Positive for easy bruising  Endocrine:  Negative      PHYSICAL EXAMINATION:    GENERAL:  The patient is a pleasant 66 year old who appears their stated age.  In no apparent distress.  VITAL SIGNS:  /68 (BP Location: Left arm, Patient Position: Sitting, Cuff Size: Adult Regular)   Pulse 89   Ht 1.753 m (5' 9\")   Wt 58.7 kg (129 lb 8 oz)   LMP  (LMP Unknown)   SpO2 97%   BMI 19.12 kg/m      RESPIRATORY:  Breathing is nonlabored.    CARDIAC:  RRR. No murmur  NEUROLOGIC:  Alert and oriented.    DATA REVIEWED:    TTE 6/15/2021  The visual ejection fraction is estimated at 45-50%. Approx 47% by biplane.  GLS -17%  Both the above parameters slightly improved compared with most recent study.  There is mild (1+) mitral regurgitation.  There is mild (1+) tricuspid regurgitation.  There is mild (1+) pulmonic valvular regurgitation.    ASSESSMENT AND PLAN:    66 year old F with metastatic breast CA with complication of recent DVT/PE in Feb 2021 and a chemo-induced CM whom returns for follow up. She is followed closely as she is on life-long herceptin.     Her echo last week shows improvement from last month and is more consistent with what we had seen over the past few years. I recommended she continue her current dose of coreg. Will plan a repeat echo in 3 months and " clinic follow up at that time as well.    She is on life-long AC due to DVT/PE in the setting of CA.     Follow up: 3 months with echo prior.  Of course, the patient was encouraged to contact us sooner with questions or concerns.    Melisa Billingsley PA-C    cc:   Melisa Billingsley PA-C  [facility]  [address] [place] [address]

## 2021-06-23 NOTE — PATIENT INSTRUCTIONS
Thank you for your [facility] visit today. Your provider has recommended the following:  Medication Changes:  No changes right now  Recommendations:  Echo in 3 months  Follow-up:  See Melisa for cardiology follow up in 3 months.    Reminder:  Please bring in your current medication list or your medication, over the counter supplements and vitamin bottles as we will review these at each office visit.

## 2021-07-19 ENCOUNTER — TRANSFERRED RECORDS (OUTPATIENT)
Dept: HEALTH INFORMATION MANAGEMENT | Facility: CLINIC | Age: 66
End: 2021-07-19

## 2021-07-28 ENCOUNTER — TRANSFERRED RECORDS (OUTPATIENT)
Dept: HEALTH INFORMATION MANAGEMENT | Facility: CLINIC | Age: 66
End: 2021-07-28

## 2021-09-01 ENCOUNTER — TRANSFERRED RECORDS (OUTPATIENT)
Dept: HEALTH INFORMATION MANAGEMENT | Facility: CLINIC | Age: 66
End: 2021-09-01

## 2021-09-14 ENCOUNTER — HOSPITAL ENCOUNTER (OUTPATIENT)
Dept: CARDIOLOGY | Facility: CLINIC | Age: 66
Discharge: HOME | End: 2021-09-14
Attending: PHYSICIAN ASSISTANT | Admitting: PHYSICIAN ASSISTANT
Payer: MEDICARE

## 2021-09-14 DIAGNOSIS — I42.9 SECONDARY CARDIOMYOPATHY (H): ICD-10-CM

## 2021-09-14 LAB — LVEF ECHO: NORMAL

## 2021-09-14 PROCEDURE — 93325 DOPPLER ECHO COLOR FLOW MAPG: CPT | Mod: 26 | Performed by: INTERNAL MEDICINE

## 2021-09-14 PROCEDURE — 93308 TTE F-UP OR LMTD: CPT | Mod: 26 | Performed by: INTERNAL MEDICINE

## 2021-09-14 PROCEDURE — 93321 DOPPLER ECHO F-UP/LMTD STD: CPT | Mod: 26 | Performed by: INTERNAL MEDICINE

## 2021-09-14 PROCEDURE — 93308 TTE F-UP OR LMTD: CPT

## 2021-09-15 ENCOUNTER — OFFICE VISIT (OUTPATIENT)
Dept: CARDIOLOGY | Facility: CLINIC | Age: 66
End: 2021-09-15
Attending: PHYSICIAN ASSISTANT
Payer: MEDICARE

## 2021-09-15 VITALS
BODY MASS INDEX: 18.6 KG/M2 | SYSTOLIC BLOOD PRESSURE: 110 MMHG | DIASTOLIC BLOOD PRESSURE: 76 MMHG | OXYGEN SATURATION: 95 % | WEIGHT: 125.6 LBS | HEIGHT: 69 IN | HEART RATE: 80 BPM

## 2021-09-15 DIAGNOSIS — I42.9 SECONDARY CARDIOMYOPATHY (H): ICD-10-CM

## 2021-09-15 PROCEDURE — 99214 OFFICE O/P EST MOD 30 MIN: CPT | Performed by: PHYSICIAN ASSISTANT

## 2021-09-15 ASSESSMENT — MIFFLIN-ST. JEOR: SCORE: 1184.1

## 2021-09-15 NOTE — PATIENT INSTRUCTIONS
Thank you for your [facility] visit today. Your provider has recommended the following:  Medication Changes:  No changes  Recommendations:  Echo in 3 months  Follow-up:  Cardiology follow up in 3 months.    Reminder:  Please bring in your current medication list or your medication, over the counter supplements and vitamin bottles as we will review these at each office visit.

## 2021-09-15 NOTE — PROGRESS NOTES
SERVICE DATE: 9/15/2021     Primary Cardiologist: Dr Geiger    REASON FOR VISIT:  Lorna Guzman presents for a 3 month cardio-onc follow up    HISTORY OF PRESENT ILLNESS:   Lorna is followed in our clinic from a Cardio-Oncology perspective due to a history of a chemo-induced cardiomyopathy and metastatic breast carcinoma.    She was initially diagnosed with breast cancer in 2006, at which time she underwent a lumpectomy, CHOP chemotherapy and adjuvant radiation.  She was started on tamoxifen and after 2 years was transitioned to Aromasin.  She completed this treatment in 07/2013. Unfortunately, in late 2017 she was again diagnosed with an invasive ductal carcinoma of the right breast.  She was also found to have evidence of metastatic disease (in the liver and a lymph node) at that time.  As part of her workup, she underwent an echocardiogram which showed mildly reduced LV systolic function with an EF of approximately 47%.  It was at that time that she was referred to Cardiology.  She had a stress cardiac MRI which showed normal LV size with mild to moderately reduced function and an LVEF of 43%.  She was also noted to have mitral valve prolapse and mild mitral regurgitation.  Stress imaging did not show any evidence of ischemia.  She was started on carvedilol and lisinopril.  Her cancer was treated with 4 cycles of TC chemotherapy, and she was started on Herceptin on an every 3 week basis, which was to continue lifelong.  Fortunately, from a cardiac standpoint, followup cardiac MRIs and echocardiography have shown slight improvement/stability of her LVEF with ongoing therapy.  In the late summer/early fall of 2018 she did develop severe facial swelling and it was felt that this was related to angioedema secondary to her lisinopril, which was discontinued.  At that time, we decided to continue with carvedilol alone unless we saw a decline in her LV systolic function. Spring 2019 she was noted to have a  growing R breast mass, she was started on fulverstrant and ribociclib. She had a R mastectomy 10/2019 and had radiation in March 2020. Late 2020 she was found to have more metastatic disease in her chest/lungs. Fulverstrant and ribociclib stopped and she has started chemo with paclitaxel, pertuzumab and trastuzumab.    Unfortunately she developed a PE/DVT in Feb 2021, she was briefly hospitalized. A limited echocardiogram suggested probably stable LV systolic function, but she had significant RV dysfunction related to the pulmonary embolism.  The RV cavity was moderately to severely dilated with moderately reduced function.  Her chemo was put on hold and she also had a reduction in her carvedilol dose because of low blood pressures. Echo in March showed LVEF 45-50%, stable from Feb. GLS-17%. RV was normal size, RV systolic function is borderline reduced. She was given the ok to resume her cancer meds.    April 2021 TTE showed a decline in the GLS -15.4%, LVEF is 41%. Coreg increased to 12.5mg BID. Repeat echo 1 month later showed LVEF 45-50%, GLS -17%.    Unfortunately her scans in July showed some progression of her disease. She was transitioned to ado-trastuzumab.    Since her last visit she has been feeling well from a cardiac standpoint. No CV concerns or symptoms.    CURRENT MEDICATIONS:   Current Outpatient Medications   Medication Sig Dispense Refill     ADO-trastuzumab        carvedilol (COREG) 12.5 MG tablet Take 1 tablet (12.5 mg) by mouth 2 times daily (with meals) Increased dose. Please fill. 180 tablet 3     rivaroxaban ANTICOAGULANT (XARELTO ANTICOAGULANT) 20 MG TABS tablet Take 1 tablet (20 mg) by mouth daily (with dinner)         ALLERGIES:  Allergies   Allergen Reactions     Lisinopril Hives and Swelling     Venlafaxine Hives and Swelling     Possibly allergy       ROS:  Skin:  Negative     Eyes:  Positive for glasses  ENT:  Negative    Respiratory:  Negative    Cardiovascular:  Negative   "  Gastroenterology: Negative    Genitourinary:  Negative    Musculoskeletal:  Negative    Neurologic:  Negative    Psychiatric:  Negative    Heme/Lymph/Imm:  Negative    Endocrine:  Negative      PHYSICAL EXAMINATION:    GENERAL:  The patient is a pleasant 66 year old who appears their stated age.  In no apparent distress.  VITAL SIGNS:  /76 (BP Location: Right arm, Patient Position: Sitting, Cuff Size: Adult Regular)   Pulse 80   Ht 1.753 m (5' 9\")   Wt 57 kg (125 lb 9.6 oz)   LMP  (LMP Unknown)   SpO2 95%   Breastfeeding No   BMI 18.55 kg/m      RESPIRATORY:  Breathing is nonlabored.    CARDIAC:  RRR. No murmur  NEUROLOGIC:  Alert and oriented.    DATA REVIEWED:    TTE 9/14/21:  The visual ejection fraction is 50-55%.  GLS -18.1%.  Both the above appear slightly iimproved compared with most recent study.  Mild to mod MR, mild TR and IA.    ASSESSMENT AND PLAN:    66 year old F with metastatic breast CA with complication of recent DVT/PE in Feb 2021 and a chemo-induced CM whom returns for follow up. She is followed closely as she was on life-long herceptin, however recently was found to have disease progression and transitioned to ado-trastuzumab. She still will need serial echos with this treatment.      Her echo this week shows improvement from 3 months ago. I recommended she continue her current dose of coreg. Will plan a repeat echo in 3 months and clinic follow up at that time as well.    She is on life-long AC due to DVT/PE in the setting of CA.     Follow up: 3 months with echo prior.  Of course, the patient was encouraged to contact us sooner with questions or concerns.    Melisa Billingsley PA-C    "

## 2021-09-24 ENCOUNTER — TRANSFERRED RECORDS (OUTPATIENT)
Dept: SURGERY | Facility: CLINIC | Age: 66
End: 2021-09-24

## 2021-10-04 ENCOUNTER — OFFICE VISIT (OUTPATIENT)
Dept: SURGERY | Facility: CLINIC | Age: 66
End: 2021-10-04
Payer: MEDICARE

## 2021-10-04 ENCOUNTER — LAB (OUTPATIENT)
Dept: LAB | Facility: CLINIC | Age: 66
End: 2021-10-04
Payer: MEDICARE

## 2021-10-04 VITALS
RESPIRATION RATE: 16 BRPM | BODY MASS INDEX: 18.51 KG/M2 | DIASTOLIC BLOOD PRESSURE: 80 MMHG | HEART RATE: 86 BPM | HEIGHT: 69 IN | OXYGEN SATURATION: 95 % | SYSTOLIC BLOOD PRESSURE: 124 MMHG | WEIGHT: 125 LBS

## 2021-10-04 DIAGNOSIS — Z11.59 ENCOUNTER FOR SCREENING FOR OTHER VIRAL DISEASES: ICD-10-CM

## 2021-10-04 DIAGNOSIS — C50.011 MALIGNANT NEOPLASM INVOLVING BOTH NIPPLE AND AREOLA OF RIGHT BREAST IN FEMALE, UNSPECIFIED ESTROGEN RECEPTOR STATUS (H): Primary | ICD-10-CM

## 2021-10-04 DIAGNOSIS — Z11.59 ENCOUNTER FOR SCREENING FOR OTHER VIRAL DISEASES: Primary | ICD-10-CM

## 2021-10-04 LAB — SARS-COV-2 RNA RESP QL NAA+PROBE: NEGATIVE

## 2021-10-04 PROCEDURE — 99214 OFFICE O/P EST MOD 30 MIN: CPT | Performed by: SURGERY

## 2021-10-04 PROCEDURE — U0003 INFECTIOUS AGENT DETECTION BY NUCLEIC ACID (DNA OR RNA); SEVERE ACUTE RESPIRATORY SYNDROME CORONAVIRUS 2 (SARS-COV-2) (CORONAVIRUS DISEASE [COVID-19]), AMPLIFIED PROBE TECHNIQUE, MAKING USE OF HIGH THROUGHPUT TECHNOLOGIES AS DESCRIBED BY CMS-2020-01-R: HCPCS

## 2021-10-04 ASSESSMENT — MIFFLIN-ST. JEOR: SCORE: 1181.38

## 2021-10-04 NOTE — LETTER
October 4, 2021          Zain Lua MD  [facility]  [address] [facility] [address]  [place] [address]      RE:   Lorna Guzman 1955      Dear Colleague,    Thank you for referring your patient, Lorna Guzman, to [facility] [place] location. Please see a copy of my visit note below.    [facility] Consultation     Lorna Guzman MRN# [id number]   Age: 66 year old YOB: 1955                 Assessment and Plan:   Assessment:    Skin erythema right mastectomy site, concern for malignancy      Plan:    Skin biopsy, would anticipate 3 separate sites to assure excellent sampling.  Discussed the procedure including external sutures with the patient and her .  Biopsy not to be performed in the office as she is currently on anticoagulation  Will discuss with her oncologist to see if there are any special studies that need to be done.            Chief Complaint:   Skin erythema, right mastectomy site     History is obtained from the patient and electronic health record          History of Present Illness:   This patient is a 66 year old female with a significant past medical history of advanced breast cancer with distant metastasis who underwent right breast mastectomy to excise an ulcerating tumor 2 years ago who presents with the following condition: Erythema at the right mastectomy site, concern for progressive disease.     Initially had a lumpectomy in 2006 for a T1 grade 2 invasive ductal cancer that was HER-2 negative.  She underwent chemotherapy.  11 years later palpable abnormality in the right breast was found to be a grade 3 HER-2 positive cancer.  Further staging showed positive axillary nodes and metastasis to the liver.  She underwent chemotherapy and eventual mastectomy as above for an ulcerating lesion in her breast.  She then underwent radiation therapy to the right axilla and later to the left lung her metastatic  disease was also noted.  PET/CT showed development of a hilar-based mediastinal mass.  The bronchial ultrasound was positive for metastatic carcinoma compatible with breast primary at station 7.  She is continued on chemotherapy and started 8 oh trastuzumab 7/23/2021.     She is currently on Xarelto for DVT and PE.  Chest / Breast:    Right mastectomy site, there is mild diffuse erythema caudal to her mastectomy incision.  There is smaller area but more intense erythema cephalad to the incision.  Additionally, there is a patch of erythematous skin anterior to her right shoulder that she reports is newly developed as well.  These areas paolo with pressure, they are not tender or pruritic.  There is no skin separation, ulceration or bullae.               Again, thank you for allowing me to participate in the care of your patient.      Sincerely,      Nino Castellano MD

## 2021-10-04 NOTE — PROGRESS NOTES
Breast Patients      BREAST PATIENTS (FEMALE)    At what age did your periods begin? 15    What was the date of your last menstrual period? 2006    Have you begun menopause? Yes  Age Menopause began:  51    Are you using hormone replacement therapy?  No    Number of full-term pregnancies: 3    Did you nurse your children? No    Are you pregnant now? No    Do you have breast implants? No         BREAST PATIENTS (ALL)    Have you had a previous breast biopsy? Yes  Side: Right  Date: 2006, 2017    Have you had previous Breast Cancer? Yes  Side: Right  Date: 2006, 2017

## 2021-10-04 NOTE — PROGRESS NOTES
Patient seen in clinic for asymptomatic Pre-Surgery COVID test.    Patient swabbed via Nasopharyngeal Swab.  Specimen brought to hospital lab.

## 2021-10-04 NOTE — PROGRESS NOTES
[facility] Surgery Consultation    Lornase KERI Guzman MRN# [id number]   Age: 66 year old YOB: 1955               Assessment and Plan:   Assessment:   Skin erythema right mastectomy site, concern for malignancy  Patient Active Problem List    Diagnosis Date Noted     Acute respiratory failure with hypoxia (H) 02/06/2021     Priority: Medium     Other acute pulmonary embolism, unspecified whether acute cor pulmonale present (H) 02/06/2021     Priority: Medium     Health Care Home 01/22/2021     Priority: Medium     MVP (mitral valve prolapse) 03/19/2020     Priority: Medium     Mitral regurgitation 03/19/2020     Priority: Medium     Right breast cancer with T3 tumor, >5 cm in greatest dimension (H) 10/24/2019     Priority: Medium     Malignant neoplasm of right female breast, unspecified estrogen receptor status, unspecified site of breast (H) 10/07/2019     Priority: Medium     Added automatically from request for surgery [id number]       Breast cancer (H) 04/13/2017     Priority: Medium     Cardiomyopathy, idiopathic (H) 04/13/2017     Priority: Medium     Breast Cancer history. - chemo therapy       Hyperlipidemia 09/07/2010     Priority: Medium     ACP (advance care planning) 09/07/2010     Priority: Medium     Overview:   Last colonoscopy-7/07             Plan:   Skin biopsy, would anticipate 3 separate sites to assure excellent sampling.  Discussed the procedure including external sutures with the patient and her .  Biopsy not to be performed in the office as she is currently on anticoagulation  Will discuss with her oncologist to see if there are any special studies that need to be done.            Chief Complaint:   Skin erythema, right mastectomy site     History is obtained from the patient and electronic health record         History of Present Illness:   This patient is a 66 year old female with a significant past medical history of advanced breast cancer with distant metastasis  who underwent right breast mastectomy to excise an ulcerating tumor 2 years ago who presents with the following condition: Erythema at the right mastectomy site, concern for progressive disease.    Initially had a lumpectomy in 2006 for a T1 grade 2 invasive ductal cancer that was HER-2 negative.  She underwent chemotherapy.  11 years later palpable abnormality in the right breast was found to be a grade 3 HER-2 positive cancer.  Further staging showed positive axillary nodes and metastasis to the liver.  She underwent chemotherapy and eventual mastectomy as above for an ulcerating lesion in her breast.  She then underwent radiation therapy to the right axilla and later to the left lung her metastatic disease was also noted.  PET/CT showed development of a hilar-based mediastinal mass.  The bronchial ultrasound was positive for metastatic carcinoma compatible with breast primary at station 7.  She is continued on chemotherapy and started 8 oh trastuzumab 7/23/2021.    She is currently on Xarelto for DVT and PE.          Past Medical History:     Past Medical History:   Diagnosis Date     Cancer (H)     breast     Cardiomyopathy (H)      Mitral regurgitation 3/19/2020     MVP (mitral valve prolapse) 3/19/2020             Past Surgical History:     Past Surgical History:   Procedure Laterality Date     BREAST SURGERY      March 2006     MASTECTOMY SIMPLE Right 10/24/2019    Procedure: RIGHT MASTECTOMY;  Surgeon: Nino Castellano MD;  Location: [facility] OR             Social History:     Social History     Tobacco Use     Smoking status: Never Smoker     Smokeless tobacco: Never Used   Substance Use Topics     Alcohol use: Yes     Alcohol/week: 0.0 standard drinks     Comment: rarely              Family History:     Family History   Problem Relation Age of Onset     Diabetes Mother      Breast Cancer Mother      Ovarian Cancer Mother      Hypertension Father      Breast Cancer Sister              Immunizations:  "    VACCINE/DOSE   Diptheria   DPT   DTAP   HBIG   Hepatitis A   Hepatitis B   HIB   Influenza   Measles   Meningococcal   MMR   Mumps   Pneumococcal   Polio   Rubella   Small Pox   TDAP   Varicella   Zoster             Allergies:     Allergies   Allergen Reactions     Lisinopril Hives and Swelling     Venlafaxine Hives and Swelling     Possibly allergy             Medications:     Current Outpatient Medications   Medication Sig     ADO-trastuzumab      carvedilol (COREG) 12.5 MG tablet Take 1 tablet (12.5 mg) by mouth 2 times daily (with meals) Increased dose. Please fill.     rivaroxaban ANTICOAGULANT (XARELTO ANTICOAGULANT) 20 MG TABS tablet Take 1 tablet (20 mg) by mouth daily (with dinner)     No current facility-administered medications for this visit.             Review of Systems:   CV: NEGATIVE for chest pain, palpitations or peripheral edema  C: NEGATIVE for fever, chills, change in weight  E/M: NEGATIVE for ear, mouth and throat problems  R: NEGATIVE for significant cough or SOB          Physical Exam:   All vitals have been reviewed  Patient Vitals for the past 24 hrs:   Resp Height Weight   10/04/21 0849 16 1.753 m (5' 9\") 56.7 kg (125 lb)           Chest / Breast:   Right mastectomy site, there is mild diffuse erythema caudal to her mastectomy incision.  There is smaller area but more intense erythema cephalad to the incision.  Additionally, there is a patch of erythematous skin anterior to her right shoulder that she reports is newly developed as well.  These areas paolo with pressure, they are not tender or pruritic.  There is no skin separation, ulceration or bullae.             Data:   All laboratory data reviewed  No results found for any visits on 10/04/21.       Attestation:  I have reviewed today's vital signs, notes, medications, labs and imaging.  Amount of time performed on this consult: 45 minutes.    Nino Castellano MD       "

## 2021-10-05 ENCOUNTER — ANESTHESIA (OUTPATIENT)
Dept: SURGERY | Facility: CLINIC | Age: 66
End: 2021-10-05

## 2021-10-05 ENCOUNTER — ANESTHESIA EVENT (OUTPATIENT)
Dept: SURGERY | Facility: CLINIC | Age: 66
End: 2021-10-05

## 2021-10-05 ENCOUNTER — APPOINTMENT (OUTPATIENT)
Dept: SURGERY | Facility: PHYSICIAN GROUP | Age: 66
End: 2021-10-05
Payer: MEDICARE

## 2021-10-05 ENCOUNTER — HOSPITAL ENCOUNTER (OUTPATIENT)
Facility: CLINIC | Age: 66
Discharge: HOME | End: 2021-10-05
Attending: SURGERY | Admitting: SURGERY
Payer: MEDICARE

## 2021-10-05 VITALS
OXYGEN SATURATION: 96 % | HEART RATE: 78 BPM | DIASTOLIC BLOOD PRESSURE: 72 MMHG | HEIGHT: 69 IN | SYSTOLIC BLOOD PRESSURE: 134 MMHG | WEIGHT: 121.2 LBS | BODY MASS INDEX: 17.95 KG/M2 | TEMPERATURE: 98.8 F | RESPIRATION RATE: 16 BRPM

## 2021-10-05 DIAGNOSIS — C50.011 MALIGNANT NEOPLASM INVOLVING BOTH NIPPLE AND AREOLA OF RIGHT BREAST IN FEMALE, UNSPECIFIED ESTROGEN RECEPTOR STATUS (H): ICD-10-CM

## 2021-10-05 DIAGNOSIS — C50.911 MALIGNANT NEOPLASM OF RIGHT FEMALE BREAST, UNSPECIFIED ESTROGEN RECEPTOR STATUS, UNSPECIFIED SITE OF BREAST (H): Primary | ICD-10-CM

## 2021-10-05 PROCEDURE — 999N000141 HC STATISTIC PRE-PROCEDURE NURSING ASSESSMENT: Performed by: SURGERY

## 2021-10-05 PROCEDURE — 250N000009 HC RX 250: Performed by: SURGERY

## 2021-10-05 PROCEDURE — 11104 PUNCH BX SKIN SINGLE LESION: CPT | Performed by: SURGERY

## 2021-10-05 PROCEDURE — 360N000075 HC SURGERY LEVEL 2, PER MIN: Performed by: SURGERY

## 2021-10-05 PROCEDURE — 272N000001 HC OR GENERAL SUPPLY STERILE: Performed by: SURGERY

## 2021-10-05 PROCEDURE — 710N000012 HC RECOVERY PHASE 2, PER MINUTE: Performed by: SURGERY

## 2021-10-05 PROCEDURE — 11105 PUNCH BX SKIN EA SEP/ADDL: CPT | Performed by: SURGERY

## 2021-10-05 PROCEDURE — 88377 M/PHMTRC ALYS ISHQUANT/SEMIQ: CPT | Performed by: SURGERY

## 2021-10-05 PROCEDURE — 88305 TISSUE EXAM BY PATHOLOGIST: CPT | Mod: TC | Performed by: SURGERY

## 2021-10-05 RX ORDER — OXYCODONE HYDROCHLORIDE 5 MG/1
5-10 TABLET ORAL EVERY 4 HOURS PRN
Qty: 6 TABLET | Refills: 0 | Status: SHIPPED | OUTPATIENT
Start: 2021-10-05 | End: 2021-10-21

## 2021-10-05 ASSESSMENT — MIFFLIN-ST. JEOR: SCORE: 1164.14

## 2021-10-05 NOTE — DISCHARGE INSTRUCTIONS
HOME CARE FOLLOWING MINOR SURGERY  OCTAVIO Null, DEMARCUS Francois R. O Donnell, J. Shaheen    RESULTS:  If a biopsy of tissue was done, you may call for your final pathology report after 1p.m. two working days after surgery.  Otherwise, this will be reviewed with you at time of phone follow-up (described below).    INCISIONAL CARE:    If you have a dressing in place, keep clean and dry for 48 hours; you may replace the gauze if it becomes soiled.    After 48 hours you may remove the dressing and shower.  Do not submerse incision in water for 1 week.    Sutures which are beneath the skin will absorb and do not need to be removed.    Sutures you can see should be removed at your surgeon's office near 2 weeks postop, unless otherwise instructed.    You may expect a small amount of drainage from your incision.    A lump/ridge under the incision is normal and will gradually resolve.  If it becomes red or very uncomfortable, contact the nurse at your surgeon's office to discuss whether this needs to be evaluated.    ACTIVITY:  Cautiously resume exercise and strenuous activities such as jogging, tennis, aerobics, etc. Also, be careful of stretching activities which affect the area of surgery for two weeks.    DIET:  Start with liquids and gradually resume your regular diet as tolerated.  Increased fluid intake is recommended. While taking pain medications, consider use of a stool softener, increase your fiber in your diet, or add a fiber supplement (like Metamucil, Citrucel) to help prevent constipation - a possible side effect of pain medications.    DISCOMFORT:  Local anesthetic placed at surgery should provide relief for 4-8 hours.  Begin taking pain pills before discomfort is severe.  Take the pain medication with some food, when possible, to minimize side effects.  Intermittent use of ice packs may help during the first 1-3 weeks after surgery.  Expect gradual improvement.    Over-the-counter  anti-inflammatory medications (i.e. Ibuprofen/Advil/Motrin or Naprosyn/Aleve) may be used per package instructions in addition to or while tapering off the narcotic pain medications to decrease swelling and sensitivity.  DO NOT TAKE these Anti-inflammatory medications if your primary physician has advised against doing so, or if you have acid reflux, ulcer, or bleeding disorder, or take blood-thinner medications.  Call your primary physician or the surgery office if you have medication questions.      FOLLOW-UP AFTER SURGERY:  -Our office will contact you approximately 2-3 weeks after surgery to check on your progress and answer any questions you may have.  If you are doing well, you will not need to return for an office appointment.  If any concerns are identified over the phone, we will help you make an appointment to see a provider.    -If you have not received a phone call, have any questions or concerns, or would like to be seen, please call us at [phone].  We are located at: [address], [address]; [place] [address]    -CONTACT US IF THE FOLLOWING DEVELOPS:   1. A fever that is above 101     2. Increased redness, warmth, drainage, bleeding, or swelling.   3. Pain that is not relieved by rest/ice and your prescription.   4.  Increasing pain after 48 hours.   5. Drainage that is thick, cloudy, yellow, green or white.   6. Any other questions or concerns.      FREQUENTLY ASKED QUESTIONS:    Q:  How should my incision look?    A:  Normally your incision will appear slightly swollen with light redness directly along the incision itself as it heals.  It may feel like a bump or ridge as the healing/scarring happens, and over time (3-4 months) this bump or ridge feeling should slowly go away.  In general, clear or pink watery drainage can be normal at first as your incision heals, but should decrease over time.    Q:  How do I know if my incision is infected?  A:  Look at your incision for signs of  infection, like redness around the incision spreading to surrounding skin, or drainage of cloudy or foul-smelling drainage.  If you feel warm, check your temperature to see if you are running a fever.    **If any of these things occur, please notify the nurse at our office.  We may need you to come into the office for an incision check.      Q:  How do I take care of my incision?  A:  If you have a dressing in place - Starting the day after surgery, replace the dressing 1-2 times a day until there is no further drainage from the incision.  At that time, a dressing is no longer needed.  Try to minimize tape on the skin if irritation is occurring at the tape sites.  If you have significant irritation from tape on the skin, please call the office to discuss other method of dressing your incision.      Q:  There is a piece of tape or a sticky  lead  still on my skin.  Can I remove this?  A:  Sometimes the sticky  leads  used for monitoring during surgery or for evaluation in the emergency department are not all removed while you are in the hospital.  These sometimes have a tab or metal dot on them.  You can easily remove these on your own, like taking off a band-aid.  If there is a gel substance under the  lead , simply wipe/clean it off with a washcloth or paper towel.      Q:  What can I do to minimize constipation (very hard stools, or lack of stools)?  A:  Stay well hydrated.  Increase your dietary fiber intake or take a fiber supplement -with plenty of water.  Walk around frequently.  You may consider an over-the-counter stool-softener.  Your Pharmacist can assist you with choosing one that is stocked at your pharmacy.  Constipation is also one of the most common side effects of pain medication.  If you are using pain medication, be pro-active and try to PREVENT problems with constipation by taking the steps above BEFORE constipation becomes a problem.    Q:  What do I do if I need more pain medications?  A:  Call  the office to receive refills.  Be aware that certain pain meds cannot be called into a pharmacy and actually require a paper prescription.  A change may be made in your pain med as you progress thru your recovery period or if you have side effects to certain meds.    --Pain meds are NOT refilled after 5pm on weekdays, and NOT AT ALL on the weekends, so please look ahead to prevent problems.      Q:  Why am I having a hard time sleeping now that I am at home?  A:  Many medications you receive while you are in the hospital can impact your sleep for a number of days after your surgery/hospitalization.  Decreased level of activity and naps during the day may also make sleeping at night difficult.  Try to minimize day-time naps, and get up frequently during the day to walk around your home during your recovery time.  Sleep aides may be of some help, but are not recommended for long-term use.      Q:  I am having some back discomfort.  What should I do?  A:  This may be related to certain positioning that was required for your surgery, extended periods of time in bed, or other changes in your overall activity level.  You may try ice, heat, acetaminophen, or ibuprofen to treat this temporarily.  Note that many pain medications have acetaminophen in them and would state this on the prescription bottle.  Be sure not to exceed the maximum of 4000mg per day of acetaminophen.     **If the pain you are having does not resolve, is severe, or is a flare of back pain you have had on other occasions prior to surgery, please contact your primary physician for further recommendations or for an appointment to be examined at their office.    Q:  Why am I having headaches?  A:  Headaches can be caused by many things:  caffeine withdrawal, use of pain meds, dehydration, high blood pressure, lack of sleep, over-activity/exhaustion, flare-up of usual migraine headaches.  If you feel this is related to muscle tension (a band-like feeling  around the head, or a pressure at the low-back of the head) you may try ice or heat to this area.  You may need to drink more fluids (try electrolyte drink like Gatorade), rest, or take your usual migraine medications.   **If your headaches do not resolve, worsen, are accompanied by other symptoms, or if your blood pressure is high, please call your primary physician for recommendation and/or examination.    Q:  I am unable to urinate.  What do I do?  A:  A small percentage of people can have difficulty urinating initially after surgery.  This includes being able to urinate only a very small amount at a time and feeling discomfort or pressure in the very low abdomen.  This is called  urinary retention , and is actually an urgent situation.  Proceed to your nearest Emergency department for evaluation (not an Urgent Care Center).  Sometimes the bladder does not work correctly after certain medications you receive during surgery, or related to certain procedures.  You may need to have a catheter placed until your bladder recovers.  When planning to go to an Emergency department, it may help to call the ER to let them know you are coming in for this problem after a surgery.  This may help you get in quicker to be evaluated.  **If you have symptoms of a urinary tract infection, please contact your primary physician for the proper evaluation and treatment.        If you have other questions, please call the office Monday thru Friday between 8am and 5pm to discuss with the nurse or physician assistant.  #[phone]    There is a surgeon ON CALL on weekday evenings and over the weekend in case of urgent need only, and may be contacted at the same number.    If you are having an emergency, call 911 or proceed to your nearest emergency department.

## 2021-10-05 NOTE — OP NOTE
General Surgery Brief Operative Note    Pre-operative diagnosis:  Malignant neoplasm involving both nipple and areola of right breast in female, unspecified estrogen receptor status (H) [C50.011]   Post-operative diagnosis:  Same   Procedure:  Skin biopsy x3   Surgeon: Nino Castellano MD   Assistant(s): NONE   Anesthesia: Local    Estimated blood loss: 1 cc's   Drains placed: None   Complications:  None   Findings:     Specimens:   ID Type Source Tests Collected by Time Destination   1 : Right shoulder biopsy Tissue Shoulder, Right SURGICAL PATHOLOGY EXAM Nino Castellano MD 10/5/2021  7:44 AM    2 : Right upper chest biopsy Tissue Chest SURGICAL PATHOLOGY EXAM Nino Castellano MD 10/5/2021  7:45 AM    3 : Right lower chest biopsy Tissue Chest SURGICAL PATHOLOGY EXAM Nino Castellano MD 10/5/2021  7:45 AM      Indications: This 66-year-old female has undergone prior treatment for right breast cancer that is ulcerating through her skin.  She underwent mastectomy about 2 years ago followed by radiation and chemotherapy.  She has recently changed her chemotherapy and has newly developed erythema over the mastectomy site with an additional patch over the anterior right shoulder.  She now presents for skin biopsy to evaluate for recurrent subdermal tumor.  The procedure, risk, benefits, complications, infection and bleeding were all reviewed with her preoperatively.  She is on chronic anticoagulant therapy for previous DVT.  This is not being held.    Description: Patient brought the operating placed supine on the table and the right chest is prepped and draped in a sterile manner.  A pause is performed, all 3 sites had been marked with her in preinduction.  Local anesthetic is placed at all 3 sites and massaged into the deeper tissues.  Once a field block was achieved at each site, a 4 mm skin punch was used to remove full-thickness skin and a very small amount of subcutaneous tissue.  Closure at each site closed with  a single 4-0 Monocryl suture.  Dressings were applied and she is returned to the recovery room in excellent condition with all sponge and needle counts correct, having tolerated the procedure well.  Specimen was taken the pathology and they recommended placement of formalin on the specimens and proceeded with this.    Nino Castellano MD

## 2021-10-07 LAB
PATH REPORT.COMMENTS IMP SPEC: NORMAL
PATH REPORT.FINAL DX SPEC: NORMAL
PATH REPORT.GROSS SPEC: NORMAL
PATH REPORT.MICROSCOPIC SPEC OTHER STN: NORMAL
PATH REPORT.RELEVANT HX SPEC: NORMAL
PATHOLOGY SYNOPTIC REPORT: NORMAL
PHOTO IMAGE: NORMAL

## 2021-10-07 PROCEDURE — 88342 IMHCHEM/IMCYTCHM 1ST ANTB: CPT | Mod: 26 | Performed by: PATHOLOGY

## 2021-10-07 PROCEDURE — 88360 TUMOR IMMUNOHISTOCHEM/MANUAL: CPT | Mod: 26 | Performed by: PATHOLOGY

## 2021-10-07 PROCEDURE — 88305 TISSUE EXAM BY PATHOLOGIST: CPT | Mod: 26 | Performed by: PATHOLOGY

## 2021-10-08 NOTE — RESULT ENCOUNTER NOTE
Patient was notified of these results. POS for metastatic disease at all 3 sites. Surgical sites are doing fine. She will discuss with her oncologist.   Nino Castellano MD  10/8/2021 10:07 AM

## 2021-10-14 LAB — INTERPRETATION: NORMAL

## 2021-10-14 PROCEDURE — 88377 M/PHMTRC ALYS ISHQUANT/SEMIQ: CPT | Mod: 26 | Performed by: MEDICAL GENETICS

## 2021-10-15 ENCOUNTER — TRANSFERRED RECORDS (OUTPATIENT)
Dept: HEALTH INFORMATION MANAGEMENT | Facility: CLINIC | Age: 66
End: 2021-10-15

## 2021-10-18 ENCOUNTER — TRANSFERRED RECORDS (OUTPATIENT)
Dept: HEALTH INFORMATION MANAGEMENT | Facility: CLINIC | Age: 66
End: 2021-10-18
Payer: MEDICARE

## 2021-10-21 ENCOUNTER — APPOINTMENT (OUTPATIENT)
Dept: CT IMAGING | Facility: CLINIC | Age: 66
End: 2021-10-21
Attending: EMERGENCY MEDICINE
Payer: MEDICARE

## 2021-10-21 ENCOUNTER — HOSPITAL ENCOUNTER (OUTPATIENT)
Facility: CLINIC | Age: 66
Discharge: HOME | End: 2021-10-22
Attending: EMERGENCY MEDICINE | Admitting: INTERNAL MEDICINE
Payer: MEDICARE

## 2021-10-21 DIAGNOSIS — G93.6 VASOGENIC BRAIN EDEMA (H): ICD-10-CM

## 2021-10-21 DIAGNOSIS — I21.4 NON-STEMI (NON-ST ELEVATED MYOCARDIAL INFARCTION) (H): ICD-10-CM

## 2021-10-21 LAB
ALBUMIN SERPL-MCNC: 3.4 G/DL (ref 3.4–5)
ALP SERPL-CCNC: 56 U/L (ref 40–150)
ALT SERPL W P-5'-P-CCNC: 23 U/L (ref 0–50)
ANION GAP SERPL CALCULATED.3IONS-SCNC: 10 MMOL/L (ref 3–14)
AST SERPL W P-5'-P-CCNC: 247 U/L (ref 0–45)
ATRIAL RATE - MUSE: 76 BPM
ATRIAL RATE - MUSE: 82 BPM
BASOPHILS # BLD AUTO: 0 10E3/UL (ref 0–0.2)
BASOPHILS NFR BLD AUTO: 0 %
BILIRUB SERPL-MCNC: 0.4 MG/DL (ref 0.2–1.3)
BUN SERPL-MCNC: 19 MG/DL (ref 7–30)
CALCIUM SERPL-MCNC: 8.7 MG/DL (ref 8.5–10.1)
CHLORIDE BLD-SCNC: 97 MMOL/L (ref 94–109)
CO2 SERPL-SCNC: 25 MMOL/L (ref 20–32)
CREAT SERPL-MCNC: 0.77 MG/DL (ref 0.52–1.04)
DIASTOLIC BLOOD PRESSURE - MUSE: NORMAL MMHG
DIASTOLIC BLOOD PRESSURE - MUSE: NORMAL MMHG
EOSINOPHIL # BLD AUTO: 0 10E3/UL (ref 0–0.7)
EOSINOPHIL NFR BLD AUTO: 0 %
ERYTHROCYTE [DISTWIDTH] IN BLOOD BY AUTOMATED COUNT: 13.4 % (ref 10–15)
FLUAV RNA SPEC QL NAA+PROBE: NEGATIVE
FLUBV RNA RESP QL NAA+PROBE: NEGATIVE
GFR SERPL CREATININE-BSD FRML MDRD: 82 ML/MIN/1.73M2
GLUCOSE BLD-MCNC: 110 MG/DL (ref 70–99)
GLUCOSE BLDC GLUCOMTR-MCNC: 114 MG/DL (ref 70–99)
GLUCOSE BLDC GLUCOMTR-MCNC: 124 MG/DL (ref 70–99)
GLUCOSE BLDC GLUCOMTR-MCNC: 139 MG/DL (ref 70–99)
HCT VFR BLD AUTO: 42.2 % (ref 35–47)
HGB BLD-MCNC: 13.7 G/DL (ref 11.7–15.7)
HOLD SPECIMEN: NORMAL
IMM GRANULOCYTES # BLD: 0.2 10E3/UL
IMM GRANULOCYTES NFR BLD: 2 %
INTERPRETATION ECG - MUSE: NORMAL
INTERPRETATION ECG - MUSE: NORMAL
LYMPHOCYTES # BLD AUTO: 1.5 10E3/UL (ref 0.8–5.3)
LYMPHOCYTES NFR BLD AUTO: 15 %
MCH RBC QN AUTO: 27.7 PG (ref 26.5–33)
MCHC RBC AUTO-ENTMCNC: 32.5 G/DL (ref 31.5–36.5)
MCV RBC AUTO: 85 FL (ref 78–100)
MONOCYTES # BLD AUTO: 1 10E3/UL (ref 0–1.3)
MONOCYTES NFR BLD AUTO: 10 %
NEUTROPHILS # BLD AUTO: 7.5 10E3/UL (ref 1.6–8.3)
NEUTROPHILS NFR BLD AUTO: 73 %
NRBC # BLD AUTO: 0 10E3/UL
NRBC BLD AUTO-RTO: 0 /100
P AXIS - MUSE: 58 DEGREES
P AXIS - MUSE: 65 DEGREES
PLATELET # BLD AUTO: 254 10E3/UL (ref 150–450)
POTASSIUM BLD-SCNC: 3.9 MMOL/L (ref 3.4–5.3)
PR INTERVAL - MUSE: 136 MS
PR INTERVAL - MUSE: 138 MS
PROT SERPL-MCNC: 7.6 G/DL (ref 6.8–8.8)
QRS DURATION - MUSE: 86 MS
QRS DURATION - MUSE: 86 MS
QT - MUSE: 360 MS
QT - MUSE: 374 MS
QTC - MUSE: 420 MS
QTC - MUSE: 420 MS
R AXIS - MUSE: 13 DEGREES
R AXIS - MUSE: 15 DEGREES
RBC # BLD AUTO: 4.95 10E6/UL (ref 3.8–5.2)
SARS-COV-2 RNA RESP QL NAA+PROBE: NEGATIVE
SODIUM SERPL-SCNC: 132 MMOL/L (ref 133–144)
SYSTOLIC BLOOD PRESSURE - MUSE: NORMAL MMHG
SYSTOLIC BLOOD PRESSURE - MUSE: NORMAL MMHG
T AXIS - MUSE: 69 DEGREES
T AXIS - MUSE: 73 DEGREES
TROPONIN I SERPL-MCNC: 0.11 UG/L (ref 0–0.04)
TROPONIN I SERPL-MCNC: 0.2 UG/L (ref 0–0.04)
VENTRICULAR RATE- MUSE: 76 BPM
VENTRICULAR RATE- MUSE: 82 BPM
WBC # BLD AUTO: 10.1 10E3/UL (ref 4–11)

## 2021-10-21 PROCEDURE — 70450 CT HEAD/BRAIN W/O DYE: CPT | Mod: MG

## 2021-10-21 PROCEDURE — 36415 COLL VENOUS BLD VENIPUNCTURE: CPT | Performed by: EMERGENCY MEDICINE

## 2021-10-21 PROCEDURE — 36415 COLL VENOUS BLD VENIPUNCTURE: CPT | Performed by: INTERNAL MEDICINE

## 2021-10-21 PROCEDURE — 93005 ELECTROCARDIOGRAM TRACING: CPT | Mod: 76

## 2021-10-21 PROCEDURE — 80053 COMPREHEN METABOLIC PANEL: CPT | Performed by: EMERGENCY MEDICINE

## 2021-10-21 PROCEDURE — 84484 ASSAY OF TROPONIN QUANT: CPT | Mod: 91 | Performed by: INTERNAL MEDICINE

## 2021-10-21 PROCEDURE — 250N000012 HC RX MED GY IP 250 OP 636 PS 637: Performed by: EMERGENCY MEDICINE

## 2021-10-21 PROCEDURE — 84484 ASSAY OF TROPONIN QUANT: CPT | Performed by: EMERGENCY MEDICINE

## 2021-10-21 PROCEDURE — 250N000013 HC RX MED GY IP 250 OP 250 PS 637: Performed by: INTERNAL MEDICINE

## 2021-10-21 PROCEDURE — 93005 ELECTROCARDIOGRAM TRACING: CPT

## 2021-10-21 PROCEDURE — 96360 HYDRATION IV INFUSION INIT: CPT | Mod: 59

## 2021-10-21 PROCEDURE — 258N000003 HC RX IP 258 OP 636: Performed by: EMERGENCY MEDICINE

## 2021-10-21 PROCEDURE — 85025 COMPLETE CBC W/AUTO DIFF WBC: CPT | Performed by: EMERGENCY MEDICINE

## 2021-10-21 PROCEDURE — C9803 HOPD COVID-19 SPEC COLLECT: HCPCS

## 2021-10-21 PROCEDURE — 99223 1ST HOSP IP/OBS HIGH 75: CPT | Mod: AI | Performed by: INTERNAL MEDICINE

## 2021-10-21 PROCEDURE — 120N000001 HC R&B MED SURG/OB

## 2021-10-21 PROCEDURE — 99285 EMERGENCY DEPT VISIT HI MDM: CPT | Mod: 25

## 2021-10-21 PROCEDURE — 250N000011 HC RX IP 250 OP 636: Performed by: EMERGENCY MEDICINE

## 2021-10-21 PROCEDURE — 87636 SARSCOV2 & INF A&B AMP PRB: CPT | Performed by: EMERGENCY MEDICINE

## 2021-10-21 PROCEDURE — 250N000009 HC RX 250: Performed by: EMERGENCY MEDICINE

## 2021-10-21 PROCEDURE — 71275 CT ANGIOGRAPHY CHEST: CPT | Mod: ME

## 2021-10-21 PROCEDURE — 250N000012 HC RX MED GY IP 250 OP 636 PS 637: Performed by: INTERNAL MEDICINE

## 2021-10-21 PROCEDURE — 96361 HYDRATE IV INFUSION ADD-ON: CPT

## 2021-10-21 RX ORDER — CAPECITABINE 500 MG/1
1500 TABLET, FILM COATED ORAL 2 TIMES DAILY
COMMUNITY

## 2021-10-21 RX ORDER — HYDROMORPHONE HYDROCHLORIDE 2 MG/1
2 TABLET ORAL EVERY 4 HOURS PRN
Status: DISCONTINUED | OUTPATIENT
Start: 2021-10-21 | End: 2021-10-22 | Stop reason: HOSPADM

## 2021-10-21 RX ORDER — PANTOPRAZOLE SODIUM 40 MG/1
40 TABLET, DELAYED RELEASE ORAL DAILY
Status: DISCONTINUED | OUTPATIENT
Start: 2021-10-22 | End: 2021-10-22 | Stop reason: HOSPADM

## 2021-10-21 RX ORDER — PANTOPRAZOLE SODIUM 40 MG/1
40 TABLET, DELAYED RELEASE ORAL DAILY
COMMUNITY
End: 2021-10-27

## 2021-10-21 RX ORDER — CARVEDILOL 12.5 MG/1
12.5 TABLET ORAL 2 TIMES DAILY WITH MEALS
Status: DISCONTINUED | OUTPATIENT
Start: 2021-10-21 | End: 2021-10-22 | Stop reason: HOSPADM

## 2021-10-21 RX ORDER — NALOXONE HYDROCHLORIDE 0.4 MG/ML
0.2 INJECTION, SOLUTION INTRAMUSCULAR; INTRAVENOUS; SUBCUTANEOUS
Status: DISCONTINUED | OUTPATIENT
Start: 2021-10-21 | End: 2021-10-22 | Stop reason: HOSPADM

## 2021-10-21 RX ORDER — DEXAMETHASONE 4 MG/1
4 TABLET ORAL ONCE
Status: COMPLETED | OUTPATIENT
Start: 2021-10-21 | End: 2021-10-21

## 2021-10-21 RX ORDER — PROCHLORPERAZINE MALEATE 10 MG
10 TABLET ORAL EVERY 6 HOURS PRN
Status: DISCONTINUED | OUTPATIENT
Start: 2021-10-21 | End: 2021-10-22 | Stop reason: HOSPADM

## 2021-10-21 RX ORDER — POLYETHYLENE GLYCOL 3350 17 G/17G
17 POWDER, FOR SOLUTION ORAL DAILY PRN
Status: DISCONTINUED | OUTPATIENT
Start: 2021-10-21 | End: 2021-10-22 | Stop reason: HOSPADM

## 2021-10-21 RX ORDER — SULFAMETHOXAZOLE/TRIMETHOPRIM 800-160 MG
1 TABLET ORAL
COMMUNITY

## 2021-10-21 RX ORDER — ACETAMINOPHEN 650 MG/1
650 SUPPOSITORY RECTAL EVERY 6 HOURS PRN
Status: DISCONTINUED | OUTPATIENT
Start: 2021-10-21 | End: 2021-10-22 | Stop reason: HOSPADM

## 2021-10-21 RX ORDER — NICOTINE POLACRILEX 4 MG
15-30 LOZENGE BUCCAL
Status: DISCONTINUED | OUTPATIENT
Start: 2021-10-21 | End: 2021-10-22 | Stop reason: HOSPADM

## 2021-10-21 RX ORDER — DEXAMETHASONE 4 MG/1
4 TABLET ORAL 3 TIMES DAILY
Status: DISCONTINUED | OUTPATIENT
Start: 2021-10-21 | End: 2021-10-22 | Stop reason: HOSPADM

## 2021-10-21 RX ORDER — AMOXICILLIN 250 MG
1 CAPSULE ORAL 2 TIMES DAILY PRN
Status: DISCONTINUED | OUTPATIENT
Start: 2021-10-21 | End: 2021-10-22 | Stop reason: HOSPADM

## 2021-10-21 RX ORDER — ACETAMINOPHEN 325 MG/1
650 TABLET ORAL EVERY 6 HOURS PRN
Status: DISCONTINUED | OUTPATIENT
Start: 2021-10-21 | End: 2021-10-22 | Stop reason: HOSPADM

## 2021-10-21 RX ORDER — ONDANSETRON 4 MG/1
4 TABLET, ORALLY DISINTEGRATING ORAL EVERY 6 HOURS PRN
Status: DISCONTINUED | OUTPATIENT
Start: 2021-10-21 | End: 2021-10-22 | Stop reason: HOSPADM

## 2021-10-21 RX ORDER — IOPAMIDOL 755 MG/ML
500 INJECTION, SOLUTION INTRAVASCULAR ONCE
Status: COMPLETED | OUTPATIENT
Start: 2021-10-21 | End: 2021-10-21

## 2021-10-21 RX ORDER — NALOXONE HYDROCHLORIDE 0.4 MG/ML
0.4 INJECTION, SOLUTION INTRAMUSCULAR; INTRAVENOUS; SUBCUTANEOUS
Status: DISCONTINUED | OUTPATIENT
Start: 2021-10-21 | End: 2021-10-22 | Stop reason: HOSPADM

## 2021-10-21 RX ORDER — SULFAMETHOXAZOLE/TRIMETHOPRIM 800-160 MG
1 TABLET ORAL
Status: DISCONTINUED | OUTPATIENT
Start: 2021-10-22 | End: 2021-10-22 | Stop reason: HOSPADM

## 2021-10-21 RX ORDER — LIDOCAINE 40 MG/G
CREAM TOPICAL
Status: DISCONTINUED | OUTPATIENT
Start: 2021-10-21 | End: 2021-10-22 | Stop reason: HOSPADM

## 2021-10-21 RX ORDER — AMOXICILLIN 250 MG
2 CAPSULE ORAL 2 TIMES DAILY PRN
Status: DISCONTINUED | OUTPATIENT
Start: 2021-10-21 | End: 2021-10-22 | Stop reason: HOSPADM

## 2021-10-21 RX ORDER — ONDANSETRON 2 MG/ML
4 INJECTION INTRAMUSCULAR; INTRAVENOUS EVERY 6 HOURS PRN
Status: DISCONTINUED | OUTPATIENT
Start: 2021-10-21 | End: 2021-10-22 | Stop reason: HOSPADM

## 2021-10-21 RX ORDER — DEXTROSE MONOHYDRATE 25 G/50ML
25-50 INJECTION, SOLUTION INTRAVENOUS
Status: DISCONTINUED | OUTPATIENT
Start: 2021-10-21 | End: 2021-10-22 | Stop reason: HOSPADM

## 2021-10-21 RX ORDER — PROCHLORPERAZINE 25 MG
25 SUPPOSITORY, RECTAL RECTAL EVERY 12 HOURS PRN
Status: DISCONTINUED | OUTPATIENT
Start: 2021-10-21 | End: 2021-10-22 | Stop reason: HOSPADM

## 2021-10-21 RX ORDER — DEXAMETHASONE 4 MG/1
4 TABLET ORAL 3 TIMES DAILY
COMMUNITY

## 2021-10-21 RX ADMIN — SODIUM CHLORIDE 1000 ML: 9 INJECTION, SOLUTION INTRAVENOUS at 10:22

## 2021-10-21 RX ADMIN — SODIUM CHLORIDE 87 ML: 9 INJECTION, SOLUTION INTRAVENOUS at 10:47

## 2021-10-21 RX ADMIN — ACETAMINOPHEN 650 MG: 325 TABLET, FILM COATED ORAL at 22:00

## 2021-10-21 RX ADMIN — SENNOSIDES AND DOCUSATE SODIUM 2 TABLET: 8.6; 5 TABLET ORAL at 18:33

## 2021-10-21 RX ADMIN — ACETAMINOPHEN 650 MG: 325 TABLET, FILM COATED ORAL at 16:28

## 2021-10-21 RX ADMIN — DEXAMETHASONE 4 MG: 4 TABLET ORAL at 11:48

## 2021-10-21 RX ADMIN — IOPAMIDOL 61 ML: 755 INJECTION, SOLUTION INTRAVENOUS at 10:47

## 2021-10-21 RX ADMIN — DEXAMETHASONE 4 MG: 4 TABLET ORAL at 16:29

## 2021-10-21 RX ADMIN — CARVEDILOL 12.5 MG: 12.5 TABLET, FILM COATED ORAL at 16:29

## 2021-10-21 ASSESSMENT — ACTIVITIES OF DAILY LIVING (ADL)
ADLS_ACUITY_SCORE: 12
ADLS_ACUITY_SCORE: 12
ADLS_ACUITY_SCORE: 6
ADLS_ACUITY_SCORE: 12
ADLS_ACUITY_SCORE: 12
ADLS_ACUITY_SCORE: 6
ADLS_ACUITY_SCORE: 12
ADLS_ACUITY_SCORE: 6
ADLS_ACUITY_SCORE: 12
ADLS_ACUITY_SCORE: 6
ADLS_ACUITY_SCORE: 6

## 2021-10-21 ASSESSMENT — ENCOUNTER SYMPTOMS
CHILLS: 0
FEVER: 0
HEADACHES: 0
COUGH: 1
WEAKNESS: 1
FACIAL ASYMMETRY: 1
SHORTNESS OF BREATH: 1
SORE THROAT: 0

## 2021-10-21 NOTE — H&P
Admitted: 10/21/2021    CHIEF COMPLAINT:  Diplopia and right eye droop.    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  The patient had an MRI of the brain last week as an outpatient for a routine screening. She had no symptoms at the time, and the result came back came back positive for brain metastasis. The report is not available to review as it was done at [facility]. After the result of the MRI, she was started on dexamethasone 4 mg twice a day.  Patient was was seen by radiation oncologist and  a plan for her to start radiation therapy at [facility] next Tuesday .  Her oncologist is Dr. Lua.   Agnes developed double vision 5 days ago and a drooping of the right eye 2 days ago.  This morning, the symptoms worsened and she called her clinic and presented to the emergency room.  She denied any headache, no nausea or vomiting.  No history of seizure.  She has increased shortness of breath with exertion associated with intermittent mild coughing.  History of PE in the past and is on Xarelto.  The patient denied any fever, chills or sore throat.  She is vaccinated for COVID-19 and also received her booster dose.      In the emergency room, she was evaluated.  ED physician contacted the on-call oncologist and Dr. Chauhan recommended to increase her dexamethasone frequency to  3 times a day.  The patient was given a dose of 4 mg in the emergency room and is being admitted to the hospital for further evaluation and monitoring.  It was also noted she has elevated troponin of 0.196, which is better from her the previous level last February.  She had echocardiogram done serially to check her for  her cardiomyopathy related to chemotherapy and the latest echo was in September, which showed ejection fraction of 50-55%.    PAST MEDICAL HISTORY:     1.  Metastatic breast cancer.  2.  Recent diagnosis of brain metastasis.  3.  Chemotherapy-related cardiomyopathy.  4.  Elevated troponin.  5.  Thromboembolism.  6.  Mitral valve prolapse.  7.  Mitral regurgitation.    PAST SURGICAL HISTORY:  Biopsy of the right breast with mastectomy of the right breast.    FAMILY HISTORY:  Significant for diabetes, hypertension, ovarian cancer and breast cancer.    SOCIAL HISTORY:  The patient was in the emergency room with her spouse.  Her oncologist is Dr. Lua.  She does not smoke.  She does not drink alcohol.  She does not use illicit drugs.    HOME MEDICATIONS:  Reviewed, reconciled.  Prior to Admission Medications   Prescriptions Last Dose Informant Patient Reported? Taking?   ADO-trastuzumab Due 10/22  Yes Yes   Sig: Inject into the vein every 21 days    capecitabine (XELODA) 500 MG tablet New med -hasn't started yet  Yes No   Sig: Take 1,500 mg by mouth 2 times daily On days 1-14 of each 21 day cycle   carvedilol (COREG) 12.5 MG tablet 10/21/2021 at am  No Yes   Sig: Take 1 tablet (12.5 mg) by mouth 2 times daily (with meals) Increased dose. Please fill.   dexamethasone (DECADRON) 4 MG tablet 10/21/2021 at am  Yes Yes   Sig: Take 4 mg by mouth 3 times daily   pantoprazole (PROTONIX) 40 MG EC tablet 10/21/2021 at am  Yes Yes   Sig: Take 40 mg by mouth daily   rivaroxaban ANTICOAGULANT (XARELTO ANTICOAGULANT) 20 MG TABS tablet 10/21/2021 at am  Yes Yes   Sig: Take 20 mg by mouth daily    sulfamethoxazole-trimethoprim (BACTRIM DS) 800-160 MG tablet 10/20/2021 at am  Yes Yes   Sig: Take 1 tablet by mouth three times a week   tucatinib (TUKYSA) 150 MG tablet New med, has not received yet  Yes No   Sig: Take 300 mg by mouth 2 times daily      Facility-Administered Medications: None     PHYSICAL EXAMINATION:    GENERAL:  Awake  and alert, chronically sick-looking, with drooping of the right eyelid noted.  VITAL SIGNS:  Blood pressure 150/90, pulse rate 87, temperature 98, oxygen saturation 94% on room air.  HEENT:  Pink, anicteric.  Extraocular muscle movement intact.  NECK:  Supple.  No JVD, no thyromegaly.  CHEST:  Air entry bilaterally decreased on the right anterior lung.  CARDIOVASCULAR SYSTEM:  S1 and S2 regular.  No gallop or murmur.  ABDOMEN:  Soft, nontender, nondistended, positive bowel sounds.  No organomegaly.  EXTREMITIES:  No edema, cyanosis or clubbing.  NEUROLOGIC:  There is right eyelid drooping with signs of weakness of the right face but not so the labial fold.  Otherwise, there are no focal neurologic deficits.  PSYCHIATRIC:  Normal mood and affect, pleasant, keeps eye contact, responds to question appropriately.    DIAGNOSTIC TESTS OF INTEREST:  EKG showed sinus rhythm with premature atrial complexes at 82 beats per minute.  Repeat EKG showed a normal sinus rhythm at 76 beats per minute.  CT chest for pulmonary embolism with contrast done showed no PE. Mass-like consolidation in the left upper lobe increased from previous, suspicious for metastatic disease, atelectasis on the left lower lobe, mediastinal adenopathies and other incidental findings.  Head CT:  Abnormally decreased attenuation of the right cerebral hemisphere with minimal central mass effect.  This is probably related to some vasogenic edema. MRI done as an out patient    ASSESSMENT:  Lorna Guzman is a 66-year-old female with metastatic breast cancer, chemo induced cardiomyopathy(recovered), PE, who was diagnosed with metastatic lesion to the brain l recently  and came in today with drooping of the right eyelid and diplopia and is being admitted to the hospital for further evaluation and management.  1.  Breast cancer with new metastasis to brain.  2.  Diplopia and right eyelid droop secondary to brain metastasis.  3.  Elevated troponin, suspect  demand-induced.  4.  History of PE, currently on Xarelto.    PLAN:  The patient is being admitted as an inpatient, expect more than 2 nights stay in the hospital.  She will be on Decadron 4 mg 3 times a day.  She will be evaluated by the oncologist.  Her primary oncologist is Dr. Lua.  The emergency room physician contacted the on-call oncologist, Dr. Chauhan, who recommended to admit the patient here. There was a plan for her to have radiation therapy next Tuesday.  Further recommendation as to initiation of the treatment is per the oncologist.  For now, she will be on Decadron.  No sign of significant intracranial pressure.  No nausea, no vomiting.  She has no headache. Vasogenic edema was mentioned on CT scan. At this point, we will continue the steroids.  She was also on chemotherapy as an outpatient and that will be addressed by her oncologist.  The patient has an elevated troponin.  It was lower when compared to its level previously; at this time, it is 0.196.  Her echocardiogram last month was normal.  We will just trend troponin for now.  The patient is on full anticoagulation with Xarelto, which will be continued.      I discussed with the patient and also with her  at length the plan of care.  All her questions and concerns addressed.  They showed understanding.    CODE STATUS:  The patient is full code.    Garo Santos MD        D: 10/21/2021   T: 10/21/2021   MT: ANIL    Name:     KIMMIE PRICE  MRN:      [id number]        Account:     [id number]   :      1955           Admitted:    10/21/2021       Document: [id number]    cc:  [facility]

## 2021-10-21 NOTE — ED NOTES
[facility]  ED Nurse Handoff Report    Lorna Guzman is a 66 year old female   ED Chief complaint: No chief complaint on file.  . ED Diagnosis:   Final diagnoses:   Vasogenic brain edema (H)   Non-STEMI (non-ST elevated myocardial infarction) (H)     Allergies:   Allergies   Allergen Reactions     Lisinopril Hives and Swelling     Venlafaxine Hives and Swelling     Possibly allergy       Code Status: Full Code  Activity level - Baseline/Home:  Independent. Activity Level - Current:   Stand by Assist. Lift room needed: No. Bariatric: No   Needed: No   Isolation: No. Infection: Not Applicable  COVID r/o and special precautions.     Vital Signs:   Vitals:    10/21/21 1300 10/21/21 1330 10/21/21 1400 10/21/21 1430   BP: (!) 140/89  (!) 143/95 (!) 135/90   Pulse: 77 80 82 85   Resp: 23 24 19 20   Temp:       TempSrc:       SpO2: 93% 94% 94% 94%       Cardiac Rhythm:  ,      Pain level:    Patient confused: No. Patient Falls Risk: Yes.   Elimination Status: Has voided   Patient Report - Initial Complaint: Right eye droop and right arm weakness. Focused Assessment: generalized weakness, worse to right arm.  Right eye droop. No vision changes.   Tests Performed:   Labs Ordered and Resulted from Time of ED Arrival Up to the Time of Departure from the ED   GLUCOSE BY METER - Abnormal; Notable for the following components:       Result Value    GLUCOSE BY METER POCT 139 (*)     All other components within normal limits   COMPREHENSIVE METABOLIC PANEL - Abnormal; Notable for the following components:    Sodium 132 (*)     Glucose 110 (*)      (*)     All other components within normal limits   TROPONIN I - Abnormal; Notable for the following components:    Troponin I 0.196 (*)     All other components within normal limits   CBC WITH PLATELETS AND DIFFERENTIAL - Abnormal; Notable for the following components:    Absolute Immature Granulocytes 0.2 (*)     All other components within normal  limits   INFLUENZA A/B & SARS-COV2 PCR MULTIPLEX - Normal    Narrative:     Testing was performed using the jamaal SARS-CoV-2 & Influenza A/B Assay on the jamaal Gissell System. This test should be ordered for the detection of SARS-CoV-2 and influenza viruses in individuals who meet clinical and/or epidemiological criteria. Test performance is unknown in asymptomatic patients. This test is for in vitro diagnostic use under the FDA EUA for laboratories certified under CLIA to perform moderate and/or high complexity testing. This test has not been FDA cleared or approved. A negative result does not rule out the presence of PCR inhibitors in the specimen or target RNA in concentration below the limit of detection for the assay. If only one viral target is positive but coinfection with multiple targets is suspected, the sample should be re-tested with another FDA cleared, approved or authorized test, if coinfection would change clinical management. [facility] are certified under the Clinical Laboratory Improvement Amendments of 1988 (CLIA-88) as  qualified to perform moderate and/or high complexity laboratory testing.   EXTRA BLUE TOP TUBE   EXTRA RED TOP TUBE   EXTRA GREEN TOP (LITHIUM HEPARIN) TUBE   EXTRA PURPLE TOP TUBE   EXTRA GREEN TOP (LITHIUM HEPARIN) ON ICE   EXTRA TUBE    Narrative:     The following orders were created for panel order Extra Tube ([place] Draw).  Procedure                               Abnormality         Status                     ---------                               -----------         ------                     Extra Blue Top Tube[[id number]]                              Final result               Extra Red Top Tube[[id number]]                               Final result               Extra Green Top (Lithium...[[id number]]                      Final result               Extra Purple Top Tube[[id number]]                            Final result               Extra Green Top  (Lithium...[[id number]]                      Final result                 Please view results for these tests on the individual orders.   CBC WITH PLATELETS & DIFFERENTIAL    Narrative:     The following orders were created for panel order CBC with platelets differential.  Procedure                               Abnormality         Status                     ---------                               -----------         ------                     CBC with platelets and d...[[id number]]  Abnormal            Final result                 Please view results for these tests on the individual orders.     CT Chest Pulmonary Embolism w Contrast   Preliminary Result   IMPRESSION:   1.  No evidence for pulmonary embolism.   2.  Masslike consolidation in the left upper lobe is increased in   prominence, suspicious for metastatic disease, although an infectious   process could also possibly have this appearance.   3.  Complete atelectasis of the left lower lobe has increased since   the previous exam.   4.  Mediastinal, right axillary, and retroperitoneal adenopathy is new   since the previous exam, and is suspicious for metastatic disease.   5.  Mild age-indeterminate anterior compression and sclerosis of the   T8 vertebral body is new since the previous exam. An acute compression   fracture, possibly pathologic, is possible. Please clinically   correlate.   6.  Small left pleural effusion has increased slightly since the   previous exam.      Head CT w/o contrast   Final Result   IMPRESSION:   1. Abnormal decreased attenuation right cerebellar hemisphere with   some minimal subtle mass effect. This is probably related to some   vasogenic edema from patient's known metastatic disease, although old   films are not available for comparison. Less likely this could be due   to some ischemia or infarct. If clinically indicated, MRI of the brain   without and with contrast could be helpful in further evaluation.   2. No evidence for  intracranial hemorrhage or any significant mass   effect.      MARCELA DEL VALLE MD            SYSTEM ID:  [id number]        . Abnormal Results: see above.   Treatments provided: Fluids, diagnostics  Family Comments:  at bedside  OBS brochure/video discussed/provided to patient:  N/A  ED Medications:   Medications   0.9% sodium chloride BOLUS (1,000 mLs Intravenous New Bag 10/21/21 1022)   CT Scan Flush (87 mLs Intravenous Given 10/21/21 1047)   iopamidol (ISOVUE-370) solution 500 mL (61 mLs Intravenous Given 10/21/21 1047)   dexamethasone (DECADRON) tablet 4 mg (4 mg Oral Given 10/21/21 1148)     Drips infusing:  No  For the majority of the shift, the patient's behavior Green. Interventions performed were N/A.    Sepsis treatment initiated: No     Patient tested for COVID 19 prior to admission: YES    ED Nurse Name/Phone Number: Jai Sue RN,   2:35 PM    RECEIVING UNIT ED HANDOFF REVIEW    Above ED Nurse Handoff Report was reviewed: Yes  Reviewed by: Ivis Orta RN on October 21, 2021 at 2:39 PM

## 2021-10-21 NOTE — ED TRIAGE NOTES
Right eye droop, blurred vision that started over 48 hours ago, but is getting worse. Pt also having right arm weakness and numbness, Pt being treated for breast cancer. Pt on steroids. ABC intact.

## 2021-10-21 NOTE — PHARMACY-ADMISSION MEDICATION HISTORY
Admission medication history interview status for this patient is complete. See [facility] admission navigator for allergy information, prior to admission medications and immunization status.     Medication history interview done, indicate source(s): Patient  Medication history resources (including written lists, pill bottles, clinic record): Pharmacy dispense records ([employer] Context Relevant)  [facility]    Changes made to PTA medication list:  Added: Tukysa, capecitabine, dexamethasone, sulfamethoxazole/trimethoprim, pantoprazole  Changed: none  Reported as Not Taking: none  Removed: oxycodone    Actions taken by pharmacist (provider contacted, etc):None     Additional medication history information:    **Bactrim, dex, pantoprazole, capecitabine, & tukysa are all new meds just prescribed over the past few days. Capecitabine & Tukysa are en route from specialty pharmacy so pt has not started those yet.       Medication reconciliation/reorder completed by provider prior to medication history?  N   (Y/N)         Prior to Admission medications    Medication Sig Last Dose Taking? Auth Provider   ADO-trastuzumab Inject into the vein every 21 days  Due 10/22 Yes Reported, Patient   carvedilol (COREG) 12.5 MG tablet Take 1 tablet (12.5 mg) by mouth 2 times daily (with meals) Increased dose. Please fill. 10/21/2021 at am Yes Melisa Billingsley PA-C   dexamethasone (DECADRON) 4 MG tablet Take 4 mg by mouth 3 times daily 10/21/2021 at am Yes Unknown, Entered By History   pantoprazole (PROTONIX) 40 MG EC tablet Take 40 mg by mouth daily 10/21/2021 at am Yes Unknown, Entered By History   rivaroxaban ANTICOAGULANT (XARELTO ANTICOAGULANT) 20 MG TABS tablet Take 20 mg by mouth daily  10/21/2021 at am Yes Melisa Billingsley PA-C   sulfamethoxazole-trimethoprim (BACTRIM DS) 800-160 MG tablet Take 1 tablet by mouth three times a week 10/20/2021 at am Yes Unknown, Entered By History   capecitabine  (XELODA) 500 MG tablet Take 1,500 mg by mouth 2 times daily On days 1-14 of each 21 day cycle New med -hasn't started yet  Unknown, Entered By History   tucatinib (TUKYSA) 150 MG tablet Take 300 mg by mouth 2 times daily New med, has not received yet  Unknown, Entered By History

## 2021-10-21 NOTE — ED PROVIDER NOTES
History   Chief Complaint:  No chief complaint on file.       The history is provided by the patient.      Lorna Guzman is a 66 year old female with history of breast cancer with brain metastasis who presents for evaluation of right facial droop. Lorna has a history of breast cancer with recently diagnosed brain metastasis on MRI. MRI was preformed my [facility] for routine screening. She recently increased her dexamethasone dose to 4 mg three times daily and is scheduled to start radiation this Tuesday. She follows with Dr. Lua. She developed double vision on Saturday and drooping to the right eye and right hand weakness 48 hours ago. Symptoms worsened this morning, prompting ED presentation. No new leg symptoms or headache. She reports increased shortness of breath with exertion with associated mild cough. She has a history of blood clots and is taking Xarelto. No chest pain, fever, chills, sore throat, or cold symptoms.     Review of Systems   Constitutional: Negative for chills and fever.   HENT: Negative for sore throat.    Eyes: Positive for visual disturbance.   Respiratory: Positive for cough and shortness of breath.    Cardiovascular: Negative for chest pain.   Neurological: Positive for facial asymmetry and weakness. Negative for headaches.   All other systems reviewed and are negative.    Allergies:  Lisinopril  Venlafaxine    Medications:  Ado-trastuzumab  Carvedilol   Dexamethasone   Oxycodone    Recentin    Rivaroxaban     Past Medical History:     Breast cancer with brain metastasis  Cardiomyopathy  Mitral regurgitation  Mitral valve prolapse  Thrombosis    Past Surgical History:    Biopsy right breast  Mastectomy simple, right    Family History:    Breast cancer  Diabetes  Hypertension  Ovarian cancer    Social History:  Presents with her spouse, Leoncio  Oncology: Dr. Lua    Physical Exam     Patient Vitals for the past 24 hrs:   BP Temp Temp src Pulse Resp SpO2   10/21/21 1430 (!)  135/90 -- -- 85 20 94 %   10/21/21 1400 (!) 143/95 -- -- 82 19 94 %   10/21/21 1330 -- -- -- 80 24 94 %   10/21/21 1300 (!) 140/89 -- -- 77 23 93 %   10/21/21 0939 122/88 (!) 96.1  F (35.6  C) Temporal 95 16 100 %       Physical Exam  Nursing note and vitals reviewed.  Constitutional:  Appears well-developed and well-nourished.   HENT:   Head:    Atraumatic.   Mouth/Throat:   Oropharynx is clear and moist. No oropharyngeal exudate.   Eyes:    Pupils are equal, round, and reactive to light.   Neck:    Normal range of motion. Neck supple.      No tracheal deviation present. No thyromegaly present.   Cardiovascular:  Normal rate, regular rhythm, no murmur   Pulmonary/Chest: Breath sounds are clear and equal without wheezes or crackles. Erythematous firm tissue in the region of her right breast.   Abdominal:   Soft. Bowel sounds are normal. Exhibits no distension and      no mass. There is no tenderness.      There is no rebound and no guarding.   Musculoskeletal:  Exhibits no edema.   Lymphadenopathy:  No cervical adenopathy.   Neurological:   Alert and oriented to person, place, and time. Right eyelid drooping but no lower facial droop. Forehead muscles move normally. Normal movement of the tongue. Right  is slightly weaker than the left. But good proximal arm strength. Strong leg strength bilaterally.   Skin:    Skin is warm and dry. No rash noted. No pallor.      Emergency Department Course   ECG #1  ECG obtained at 1019, ECG read at 1029  Sinus rhythm with premature atrial complexes. Nonspecific ST abnormality. Abnormal ECG.    No significant change as compared to prior, dated 2/6/21.  Rate 82 bpm. ND interval 138 ms. QRS duration 86 ms. QT/QTc 360/420 ms. P-R-T axes 58 13 69.     ECG #2  ECG taken at 1207, ECG read at 1208  Normal sinus rhythm. Normal ECG.    Rate 76 bpm. ND interval 136 ms. QRS duration 86 ms. QT/QTc 374/420 ms. P-R-T axes 65 15 73.     Imaging:  CT Chest Pulmonary Embolism w Contrast    Preliminary Result   IMPRESSION:   1.  No evidence for pulmonary embolism.   2.  Masslike consolidation in the left upper lobe is increased in   prominence, suspicious for metastatic disease, although an infectious   process could also possibly have this appearance.   3.  Complete atelectasis of the left lower lobe has increased since   the previous exam.   4.  Mediastinal, right axillary, and retroperitoneal adenopathy is new   since the previous exam, and is suspicious for metastatic disease.   5.  Mild age-indeterminate anterior compression and sclerosis of the   T8 vertebral body is new since the previous exam. An acute compression   fracture, possibly pathologic, is possible. Please clinically   correlate.   6.  Small left pleural effusion has increased slightly since the   previous exam.      Head CT w/o contrast   Final Result   IMPRESSION:   1. Abnormal decreased attenuation right cerebellar hemisphere with   some minimal subtle mass effect. This is probably related to some   vasogenic edema from patient's known metastatic disease, although old   films are not available for comparison. Less likely this could be due   to some ischemia or infarct. If clinically indicated, MRI of the brain   without and with contrast could be helpful in further evaluation.   2. No evidence for intracranial hemorrhage or any significant mass   effect.      MARCELA DEL VALLE MD            SYSTEM ID:  [id number]        Report per radiology    Laboratory:  Labs Ordered and Resulted from Time of ED Arrival Up to the Time of Departure from the ED   GLUCOSE BY METER - Abnormal; Notable for the following components:       Result Value    GLUCOSE BY METER POCT 139 (*)     All other components within normal limits   COMPREHENSIVE METABOLIC PANEL - Abnormal; Notable for the following components:    Sodium 132 (*)     Glucose 110 (*)      (*)     All other components within normal limits   TROPONIN I - Abnormal; Notable for the following  components:    Troponin I 0.196 (*)     All other components within normal limits   CBC WITH PLATELETS AND DIFFERENTIAL - Abnormal; Notable for the following components:    Absolute Immature Granulocytes 0.2 (*)     All other components within normal limits   INFLUENZA A/B & SARS-COV2 PCR MULTIPLEX - Normal    Narrative:     Testing was performed using the jamaal SARS-CoV-2 & Influenza A/B Assay on the jamaal Gissell System. This test should be ordered for the detection of SARS-CoV-2 and influenza viruses in individuals who meet clinical and/or epidemiological criteria. Test performance is unknown in asymptomatic patients. This test is for in vitro diagnostic use under the FDA EUA for laboratories certified under CLIA to perform moderate and/or high complexity testing. This test has not been FDA cleared or approved. A negative result does not rule out the presence of PCR inhibitors in the specimen or target RNA in concentration below the limit of detection for the assay. If only one viral target is positive but coinfection with multiple targets is suspected, the sample should be re-tested with another FDA cleared, approved or authorized test, if coinfection would change clinical management. [facility] are certified under the Clinical Laboratory Improvement Amendments of 1988 (CLIA-88) as  qualified to perform moderate and/or high complexity laboratory testing.   EXTRA BLUE TOP TUBE   EXTRA RED TOP TUBE   EXTRA GREEN TOP (LITHIUM HEPARIN) TUBE   EXTRA PURPLE TOP TUBE   EXTRA GREEN TOP (LITHIUM HEPARIN) ON ICE   EXTRA TUBE    Narrative:     The following orders were created for panel order Extra Tube ([facility] Draw).  Procedure                               Abnormality         Status                     ---------                               -----------         ------                     Extra Blue Top Tube[[id number]]                              Final result               Extra Red Top Tube[[id number]]                                Final result               Extra Green Top (Lithium...[[id number]]                      Final result               Extra Purple Top Tube[[id number]]                            Final result               Extra Green Top (Lithium...[[id number]]                      Final result                 Please view results for these tests on the individual orders.   CBC WITH PLATELETS & DIFFERENTIAL    Narrative:     The following orders were created for panel order CBC with platelets differential.  Procedure                               Abnormality         Status                     ---------                               -----------         ------                     CBC with platelets and d...[[id number]]  Abnormal            Final result                 Please view results for these tests on the individual orders.       Emergency Department Course:  Reviewed:  I reviewed nursing notes, vitals, past medical history and Care Everywhere    Assessments:  0955 I obtained history and examined the patient as noted above.   1146 I was informed of critical troponin.   1209 I rechecked the patient and explained findings.     Consults:  1130 I consulted with Dr. Meraz, Stroke Neurology, regarding the patient's history and presentation here in the emergency department.  1131 I consulted with Dr. Chauhan, Oncology, regarding the patient's history and presentation here in the emergency department.  1226 I consulted with Dr. Santos of the hospitalist services who is in agreement to accept the patient for admission.     Interventions:  1022 0.9% sodium chloride bolus, 1,000 ml, IV  1148 Decadron, 4 mg, PO    Disposition:  The patient was admitted to the hospital under the care of Dr. Santos.     Impression & Plan     Medical Decision Making:  I found this patient to have worsening vasogenic edema from brain metastasis as the cause of her double vision, right facial droop, and right hand weakness. There was no sign  of bleeding into the brain metastasis. I consulted oncology and she was kept on the Decadron 4 mg 3x a day with plan to admit her for further monitoring. She also has an elevated troponin with nonspecific ST & T wave abnormality and shortness of breath with exertion without any chest pain so further cardiac workup is indicated. I discussed case with the hospitalist and for anticoagulation she will be kept on the Xarelto and the hospitalist did not feel heparin was indicated at this time. There was no sign of pulmonary embolism on her chest CT and I felt her CT findings were consistent with her metastasis to the lung and not pneumonia, which was discussed with her.       Covid-19  Lorna KERI Thomas was evaluated during a global COVID-19 pandemic, which necessitated consideration that the patient might be at risk for infection with the SARS-CoV-2 virus that causes COVID-19.   Applicable protocols for evaluation were followed during the patient's care.   COVID-19 was considered as part of the patient's evaluation. The plan for testing is:  a test was obtained during this visit.    Diagnosis:    ICD-10-CM    1. Vasogenic brain edema (H)  G93.6    2. Non-STEMI (non-ST elevated myocardial infarction) (H)  I21.4        Scribe Disclosure:  Nicole RACHEL, am serving as a scribe at 9:50 AM on 10/21/2021 to document services personally performed by Aubree Higginbotham MD based on my observations and the provider's statements to me.              Aubree Higginbotham MD  10/21/21 5644

## 2021-10-22 VITALS
RESPIRATION RATE: 14 BRPM | SYSTOLIC BLOOD PRESSURE: 142 MMHG | TEMPERATURE: 98.4 F | HEART RATE: 80 BPM | DIASTOLIC BLOOD PRESSURE: 94 MMHG | OXYGEN SATURATION: 95 %

## 2021-10-22 LAB
ANION GAP SERPL CALCULATED.3IONS-SCNC: 9 MMOL/L (ref 3–14)
BUN SERPL-MCNC: 22 MG/DL (ref 7–30)
CALCIUM SERPL-MCNC: 9 MG/DL (ref 8.5–10.1)
CHLORIDE BLD-SCNC: 99 MMOL/L (ref 94–109)
CO2 SERPL-SCNC: 23 MMOL/L (ref 20–32)
CREAT SERPL-MCNC: 0.73 MG/DL (ref 0.52–1.04)
GFR SERPL CREATININE-BSD FRML MDRD: 87 ML/MIN/1.73M2
GLUCOSE BLD-MCNC: 107 MG/DL (ref 70–99)
GLUCOSE BLDC GLUCOMTR-MCNC: 125 MG/DL (ref 70–99)
GLUCOSE BLDC GLUCOMTR-MCNC: 144 MG/DL (ref 70–99)
POTASSIUM BLD-SCNC: 4.3 MMOL/L (ref 3.4–5.3)
SODIUM SERPL-SCNC: 131 MMOL/L (ref 133–144)
TROPONIN I SERPL-MCNC: 0.09 UG/L (ref 0–0.04)

## 2021-10-22 PROCEDURE — 250N000012 HC RX MED GY IP 250 OP 636 PS 637: Performed by: INTERNAL MEDICINE

## 2021-10-22 PROCEDURE — 82374 ASSAY BLOOD CARBON DIOXIDE: CPT | Performed by: INTERNAL MEDICINE

## 2021-10-22 PROCEDURE — 82962 GLUCOSE BLOOD TEST: CPT

## 2021-10-22 PROCEDURE — 36415 COLL VENOUS BLD VENIPUNCTURE: CPT | Performed by: INTERNAL MEDICINE

## 2021-10-22 PROCEDURE — 250N000013 HC RX MED GY IP 250 OP 250 PS 637: Performed by: INTERNAL MEDICINE

## 2021-10-22 PROCEDURE — 99207 PR CDG-CODE CATEGORY CHANGED: CPT | Performed by: INTERNAL MEDICINE

## 2021-10-22 PROCEDURE — 99238 HOSP IP/OBS DSCHRG MGMT 30/<: CPT | Performed by: INTERNAL MEDICINE

## 2021-10-22 RX ADMIN — RIVAROXABAN 20 MG: 20 TABLET, FILM COATED ORAL at 09:14

## 2021-10-22 RX ADMIN — CARVEDILOL 12.5 MG: 12.5 TABLET, FILM COATED ORAL at 09:14

## 2021-10-22 RX ADMIN — ACETAMINOPHEN 650 MG: 325 TABLET, FILM COATED ORAL at 12:27

## 2021-10-22 RX ADMIN — ACETAMINOPHEN 650 MG: 325 TABLET, FILM COATED ORAL at 06:30

## 2021-10-22 RX ADMIN — SULFAMETHOXAZOLE AND TRIMETHOPRIM 1 TABLET: 800; 160 TABLET ORAL at 09:17

## 2021-10-22 RX ADMIN — PANTOPRAZOLE SODIUM 40 MG: 40 TABLET, DELAYED RELEASE ORAL at 06:30

## 2021-10-22 RX ADMIN — DEXAMETHASONE 4 MG: 4 TABLET ORAL at 09:14

## 2021-10-22 ASSESSMENT — ACTIVITIES OF DAILY LIVING (ADL)
ADLS_ACUITY_SCORE: 6

## 2021-10-22 NOTE — PLAN OF CARE
BP (!) 142/94 (BP Location: Right arm)   Pulse 80   Temp 98.4  F (36.9  C) (Oral)   Resp 14   LMP  (LMP Unknown)   SpO2 95%      1:56 PM MD updated: Pt states that she has enough dexamethasone at home and does not need another Rx.     Pt A/Ox4. Ind in room. Regular diet. Neuros intact expt: R eye drooping and double vision.Tele: SR HR 84. LS clear. C/O back pain. PRN medication given. See MAR. B and 144-taken after pt eat meal. hem/onc consult today. Discharge home today with . AVS reviewed with patient. Patient is in stable condition, VSS, no co pain/cp/sob. All discharge education reviewed with pt in regards to: diet, activity, safety, s/s to report, medications and rx, follow up appointments/care.  All questions answered. Pt denies any further questions or concerns. PIV removed. No complications. Telemetry monitor removed. All belongings returned. Pt escorted to front door by [facility] staff.

## 2021-10-22 NOTE — CONSULTS
"CLINICAL NUTRITION SERVICES  -  ASSESSMENT NOTE      MALNUTRITION:  % Weight Loss: Unable to determine without admit wt  % Intake:  </= 75% for >/= 1 month (severe malnutrition)  Subcutaneous Fat Loss:  Orbital region moderate depletion and Upper arm region moderate to severe depletion   Muscle Loss:  Temporal region moderate depletion, Clavicle bone region moderate to severe depletion and Acromion bone region moderate to severe depletion  Fluid Retention: None noted or documented    Malnutrition Diagnosis: Moderate malnutrition  In Context of:  Chronic illness or disease        REASON FOR ASSESSMENT  Lorna Guzman is a 66 year old female seen by Registered Dietitian for Admission Nutrition Risk Screen for positive and Provider Order - \"malnutrition\".      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    Admit 2/2: Diplopia, R eyelid droop w/ brain mets    NUTRITION HISTORY  - Information obtained from patient, significant other in room, and chart.  - Seen by RD in 2/2021, met malnutrition criteria at that time based on decreased PO intakes, fat/muscle loss.  Described to have decreased appetite for period of months at that time, limited desire to eat overall.    - Diet at home: Regular.    - Usual intakes: Ongoing decreased appetite reported.  No difficult swallowing, no N/V, just not feeling hungry.  Having 2 small meals/day per report.  Meals described as half portions of things like sandwiches, pastas, etc.  Suspect meeting <75% needs (or less) for period of months PTA based on diet recall/report and NFPE.  - Use of oral supplements: Remembers trialing Ensure and smoothies when in the hospital in 2/2021, no use of supplements at home since that time.  - Chewing/swallowing issues: Denies.  - Allergies: NKFA.      CURRENT NUTRITION ORDERS  Diet Order:     Regular    Current Intake/Tolerance:  Limited timeframe since admit.  Helped to order a lunch meal.  Wanted to get smoothies while " "admitted though we discussed these are no longer available/on the menu.  She is ok w/ Ensure and mixing in ice cream.        NUTRITION FOCUSED PHYSICAL ASSESSMENT FOR DIAGNOSING MALNUTRITION)  Yes     Obtained from Chart/Interdisciplinary Team:  - No documentation of PI  - Stooling patterns reviewed    ANTHROPOMETRICS  Height: 5' 9\"  Weight: 0 lbs 0 oz  There is no height or weight on file to calculate BMI.  Weight Status: Unable to determine without admit wt  Weight History:  Wt Readings from Last 10 Encounters:   10/05/21 55 kg (121 lb 3.2 oz)   10/04/21 56.7 kg (125 lb)   09/15/21 57 kg (125 lb 9.6 oz)   06/23/21 58.7 kg (129 lb 8 oz)   02/09/21 63.6 kg (140 lb 3.4 oz)   01/22/21 62.2 kg (137 lb 3.2 oz)   07/02/20 69.4 kg (153 lb)   04/07/20 67.7 kg (149 lb 3.2 oz)   12/23/19 72.5 kg (159 lb 12.8 oz)   11/07/19 72.6 kg (160 lb)     - Patient reports she has been losing weight.  Feels she was weighing somewhere between 117-120# at home prior to admit.  - No admit wt but based on above, potential for ongoing wt loss.  Meets malnutrition criteria based on NFPE alone.    LABS  Labs reviewed:  Electrolytes  Potassium (mmol/L)   Date Value   10/22/2021 4.3   10/21/2021 3.9   02/06/2021 4.6   06/15/2017 4.7   04/14/2017 4.7    Blood Glucose  Glucose (mg/dL)   Date Value   10/22/2021 107 (H)   10/21/2021 110 (H)   02/06/2021 214 (H)   01/22/2021 141 (A)   06/15/2017 100   04/14/2017 98   04/13/2017 98     GLUCOSE BY METER POCT (mg/dL)   Date Value   10/22/2021 125 (H)   10/21/2021 124 (H)   10/21/2021 114 (H)   10/21/2021 139 (H)     Hemoglobin A1C (%)   Date Value   02/06/2021 5.5   01/22/2021 5.9    Inflammatory Markers  WBC (10e9/L)   Date Value   02/10/2021 6.3   02/09/2021 7.7   02/06/2021 9.5     WBC Count (10e3/uL)   Date Value   10/21/2021 10.1     Albumin (g/dL)   Date Value   10/21/2021 3.4   06/15/2017 4.1   04/14/2017 4.2   04/14/2017 4.2      Sodium (mmol/L)   Date Value   10/22/2021 131 (L)   10/21/2021 132 " (L)   02/06/2021 132 (L)   06/15/2017 143   04/14/2017 141    Renal  Urea Nitrogen (mg/dL)   Date Value   10/22/2021 22   10/21/2021 19   02/06/2021 10   06/15/2017 12   04/14/2017 16   04/14/2017 16     Creatinine (mg/dL)   Date Value   10/22/2021 0.73   10/21/2021 0.77   02/06/2021 0.77   06/15/2017 0.88   04/14/2017 0.81     Additional  No results found for: TRIG, URINEKETONE     B/P: 133/86, T: 98.6, P: 87, R: 16      MEDICATIONS  Medications reviewed:    carvedilol  12.5 mg Oral BID w/meals     dexamethasone  4 mg Oral TID     insulin aspart  1-7 Units Subcutaneous TID AC     insulin aspart  1-5 Units Subcutaneous At Bedtime     pantoprazole  40 mg Oral Daily     rivaroxaban ANTICOAGULANT  20 mg Oral Daily     sodium chloride (PF)  3 mL Intracatheter Q8H     sulfamethoxazole-trimethoprim  1 tablet Oral Once per day on Mon Wed Fri        - MEDICATION INSTRUCTIONS -            ASSESSED NUTRITION NEEDS PER APPROVED PRACTICE GUIDELINES:    Dosing Weight 55 kg   Estimated Energy Needs: 30-35 Kcal/Kg  Justification: repletion, unlikely to meet orally   Estimated Protein Needs: >/=1.5 g pro/Kg  Justification: preservation of lean body mass, unlikely to meet orally   Estimated Fluid Needs: per MD      NUTRITION DIAGNOSIS:  Malnutrition related to ongoing decreased appetite/intake in the setting of metastatic disease on chemotherapy as evidenced by suspect meeting <75% needs (or less) for period of months PTA, likely ongoing wt loss based on report, overt fat/muscle loss.    NUTRITION INTERVENTIONS  Recommendations / Nutrition Prescription  Continue regular diet from a nutrition standpoint.  Self-selection of high calorie/high protein as able, small/frequent meals as needed.    Ensure + ice cream at 2 pm (vanilla).      Admit wt ordered.    Unlikely to meet needs orally has met malnutrition criteria since at least 2/2021, decreased PO intakes ongoing.  Overall nutrition-related POC per MD/Oncology  teams.      Implementation  Nutrition education: Provided education on above.  How to contact [facility] prn.    Medical Food Supplement: As above.     Collaboration and Referral of Nutrition care: Discussed POC with team during rounds and briefly with RN.    Nutrition Goals  Patient to consume at least 75% of meals or supplements BID-TID to show even slight improvement in PO intakes.       MONITORING AND EVALUATION:  Progress towards goals will be monitored and evaluated per protocol and Practice Guidelines          Melisa Aguilar RDN, LD  Clinical Dietitian  3rd floor/ICU: [phone]  All other floors: [phone]  Weekend/holiday: [phone]

## 2021-10-22 NOTE — PROGRESS NOTES
Oncology/Hematology Follow Up Note:    Assessment and Plan:  #1 Right ptosis, diplopia, and right arm weakness due to solitary brain metastasis  - Scheduled for Gammaknife on Tuesday  - Recommend continuing with dexamethasone 4 mg TID and pursue Gamma Knife on Tuesday.  Checked with rad onc--no earlier appointment available.  - Unable to do gamma knife in [facility]    #2 Metastatic HER2 positive breast cancer  - Scheduled to start Herceptin, capecitabine, and tucatinib today  - Will reschedule Herceptin to next week.  OK to start capecitabine and tucatinib over the weekend if the patient feels comfortable with that.  Otherwise, could start them after GammaKnife next Tuesday    OK to discharge from my standpoint.    Rigo Carcamo M.D.  [facility]  [phone]      Subjective:    Agnes was admitted to the hospital for worsening diplopia, right arm weakness, and ptosis.  Dexamethasone was increased to 4 mg TID.  Symptoms are stable compared to time of admission.      Scheduled Medications:  Reviewed active medications    Labs:  CBC RESULTS: Recent Labs   Lab Test 10/21/21  0956 02/10/21  0534 02/09/21  0517 02/09/21  0517   WBC 10.1 6.3  --  7.7   HGB 13.7 7.7*  --  7.8*   HCT 42.2 24.3*  --  24.6*   MCV 85 97   < > 98    247  --  218    < > = values in this interval not displayed.       CMP  Recent Labs   Lab 10/22/21  0602 10/22/21  0141 10/21/21  2101 10/21/21  1631 10/21/21  0956 10/21/21  0939   *  --   --   --  132*  --    POTASSIUM 4.3  --   --   --  3.9  --    CHLORIDE 99  --   --   --  97  --    CO2 23  --   --   --  25  --    ANIONGAP 9  --   --   --  10  --    * 125* 124* 114* 110*   < >   BUN 22  --   --   --  19  --    CR 0.73  --   --   --  0.77  --    GFRESTIMATED 87  --   --   --  82  --    CAROLYN 9.0  --   --   --  8.7  --    PROTTOTAL  --   --   --   --  7.6  --    ALBUMIN  --   --   --   --  3.4  --    BILITOTAL  --   --   --   --  0.4  --    ALKPHOS  --   --   --   --  56  --    AST   --   --   --   --  247*  --    ALT  --   --   --   --  23  --     < > = values in this interval not displayed.       INRNo lab results found in last 7 days.    Objective/Physical Exam:  Blood pressure (!) 142/94, pulse 80, temperature 98.4  F (36.9  C), temperature source Oral, resp. rate 14, SpO2 95 %, not currently breastfeeding.  General: awake alert and oriented  Skin; no rash  HEENT:sclera anicteric  Neuro: Right ptosis noted.  Right arm strength slightly decreased.  Ext: no edema    Rigo Carcamo MD  [facility]  10/22/2021 12:39 PM

## 2021-10-22 NOTE — PLAN OF CARE
VSS. Neuro intact x R eye droop, diplopia.Tele SR. LS clear. Serial troponins, last 0.086, trending down. Tylenol given for back pain. Plan for hem/onc consult today. Up independently in room.

## 2021-10-22 NOTE — PROGRESS NOTES
[facility] Progress Note     -- Brief visit with patient and  prior to patient's discharge.     -- I asked if they got what they needed from the stay.  Patient and  said yes.     -- I wished them well.      Rev. Mitzi Aleman M.Div.  Staff   Phone  [phone]

## 2021-10-22 NOTE — PLAN OF CARE
"VSS, BP is 156/98, LS are clear, 94% on RA. Pt kamilah Reg diet, ate 100%. Fluids encouraged.Pt neuros are stable, no new deficits. Pt cont. to have diploplia, and R facial and eye droop.  at bedside, supportive. Plan tomorrow to have Hemonc consult. Serial troponins ordered, 1800 was trending down at 0.110. Pt up to BR indep.  BG's are taken as pt on decadron. BG\"s were 114 & 124. Tele is SR.  "

## 2021-10-24 ENCOUNTER — HEALTH MAINTENANCE LETTER (OUTPATIENT)
Age: 66
End: 2021-10-24

## 2021-10-25 NOTE — DISCHARGE SUMMARY
[facility]  Discharge Summary  Name: Lorna Guzman    MRN: [id number]  YOB: 1955    Age: 66 year old  Date of Discharge:  10/22/2021  2:55 PM  Date of Admission: 10/21/2021  Primary Care Provider: [facility]  Discharge Physician:  Garo Santos M.D  Discharging Service:  Hospitalist      Discharge Diagnosis:  1.  Breast cancer with new metastasis to brain.  2.  Diplopia and right eyelid droop secondary to brain metastasis.  3.  Elevated troponin, suspect demand-induced.  4.  History of PE, currently on Xarelto.     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      Discharge Disposition:  Discharged to home     Allergies:  Allergies   Allergen Reactions     Lisinopril Hives and Swelling     Venlafaxine Hives and Swelling     Possibly allergy        Discharge Medications:   Discharge Medication List as of 10/22/2021  2:27 PM      CONTINUE these medications which have NOT CHANGED    Details   ADO-trastuzumab Inject into the vein every 21 days , Historical      carvedilol (COREG) 12.5 MG tablet Take 1 tablet (12.5 mg) by mouth 2 times daily (with meals) Increased dose. Please fill., Disp-180 tablet, R-3, E-Prescribe      dexamethasone (DECADRON) 4 MG tablet Take 4 mg by mouth 3 times daily, Historical      pantoprazole (PROTONIX) 40 MG EC tablet Take 40 mg by mouth daily, Historical      rivaroxaban ANTICOAGULANT (XARELTO ANTICOAGULANT) 20 MG TABS tablet Take 20 mg by mouth daily , Historical      sulfamethoxazole-trimethoprim (BACTRIM DS) 800-160 MG tablet Take 1 tablet by mouth three times a week, Historical      capecitabine (XELODA) 500 MG tablet Take 1,500 mg by mouth 2 times daily On days 1-14 of each 21 day cycle, Historical      tucatinib (TUKYSA) 150 MG tablet Take 300 mg by mouth 2 times daily, Historical              Condition on Discharge:  Discharge condition: Fair   Discharge vitals: Blood pressure (!) 142/94,  pulse 80, temperature 98.4  F (36.9  C), temperature source Oral, resp. rate 14, SpO2 95 %, not currently breastfeeding.   Code status on discharge: Full Code     History of Illness:  See detailed admission note for full details.    Significant Physical Exam Findings:  Blood pressure (!) 142/94, pulse 80, temperature 98.4  F (36.9  C), temperature source Oral, resp. rate 14, SpO2 95 %, not currently breastfeeding.  of the right eyelid noted.  HEENT:  Pink, unicteric.  Extraocular muscle movement intact.  NECK:  Supple.  No JVD, no thyromegaly.  CHEST:  Good air entry bilaterally,  decreased on the right anterior lung.  CVS:  S1 and S2 regular.  No gallop or murmur.  ABD:  Soft, nontender, nondistended, positive bowel sounds.  No organomegaly.  EXTREMITIES:  No edema, cyanosis or clubbing.  NEUROLOGIC:  There is right eyelid drooping with signs of weakness of the right face to the nasolabial fold.  Otherwise, there is no focal neurologic deficits.  PSYCHIATRIC:  Normal mood and affect, pleasant, keeps eye contact, responds to question appropriately    Procedures other than Imaging:  None     Imaging:  Results for orders placed or performed during the hospital encounter of 10/21/21   Head CT w/o contrast    Narrative    CT SCAN OF THE HEAD WITHOUT CONTRAST   10/21/2021 11:01 AM     HISTORY: Recently diagnosed with brain metastasis, now right facial  droop and right arm weakness for 2 days, worsening today.    TECHNIQUE:  Axial images of the head and coronal reformations without  IV contrast material.  Radiation dose for this scan was reduced using  automated exposure control, adjustment of the mA and/or kV according  to patient size, or iterative reconstruction technique.    COMPARISON: None.    FINDINGS: There is an ill-defined area of decreased attenuation in the  right cerebellum measuring approximately 2 cm. There is slight mass  effect in this region as evidence by diminished adjacent subarachnoid  spaces on  coronal images. The brain parenchyma otherwise appears to be  within normal limits. Ventricles and subarachnoid space are otherwise  normal. There is no evidence for acute hemorrhage, significant mass  effect, or skull fracture. The low-density area could be related to  edema or possibly an acute infarct. There is no evidence for any bony  lesions. Visualized paranasal sinuses and mastoid air cells are clear.      Impression    IMPRESSION:  1. Abnormal decreased attenuation right cerebellar hemisphere with  some minimal subtle mass effect. This is probably related to some  vasogenic edema from patient's known metastatic disease, although old  films are not available for comparison. Less likely this could be due  to some ischemia or infarct. If clinically indicated, MRI of the brain  without and with contrast could be helpful in further evaluation.  2. No evidence for intracranial hemorrhage or any significant mass  effect.    MARCELA DEL VALLE MD         SYSTEM ID:  [id number]   CT Chest Pulmonary Embolism w Contrast    Narrative    CT CHEST PULMONARY EMBOLISM WITH CONTRAST 10/21/2021 11:02 AM    CLINICAL HISTORY: Shortness of breath. History of breast cancer.    TECHNIQUE: CT angiogram chest during arterial phase injection IV  contrast. 2D and 3D MIP reconstructions were performed by the CT  technologist. Dose reduction techniques were used.   CONTRAST: 61mL [id number]  COMPARISON: None.    FINDINGS:  ANGIOGRAM CHEST: Pulmonary arteries are normal caliber and negative  for pulmonary emboli. Thoracic aorta is negative for dissection. No CT  evidence of right heart strain.    LUNGS AND PLEURA: Small left pleural effusion. Complete atelectasis of  the left lower lobe, increased since the previous exam. Two small  calcified granulomas are again noted in the atelectatic left lower  lobe. Ill-defined area of masslike consolidation in the left upper  lobe posteriorly has increased slightly in prominence, measuring 5 x  4.2 cm  (series 9 image 86). Mild surrounding reticulonodular  infiltrate has also increased since the previous exam. Mild biapical  fibrosis, unchanged. The right lung is otherwise clear.    MEDIASTINUM/AXILLAE: Mediastinal adenopathy is new since the previous  exam, and is suspicious for metastatic disease. For example, an  enlarged subcarinal lymph node (series 7 image 55) measures 2 x 1.2  cm. Postoperative changes of prior right mastectomy. Multiple mildly  prominent right axillary lymph nodes are new since the previous exam,  and are also considered suspicious for metastatic disease. For  example, a right axillary lymph node (series 7 image 38) measures 1.1  x 0.7 cm, and is new since the previous exam. No pericardial effusion.  Calcified left hilar lymph nodes are unchanged.    CORONARY ARTERY CALCIFICATION: None.    UPPER ABDOMEN: Small hiatal hernia. Two small hypodensities in the  right hepatic lobe are unchanged, and likely represent cysts, although  the smaller of these is too small to characterize definitively.  Scattered calcified granulomas in the spleen. A few retroperitoneal  nodules are new since the previous exam, and are suspicious for  metastatic disease. For example, a retroperitoneal nodule in the left  para-aortic region (series 7 image 148) measures 1.7 x 1.7 cm, and is  new since the previous exam.    MUSCULOSKELETAL: Ill-defined sclerotic change within the T8 vertebral  body with mild associated anterior compression is new since the  previous exam.      Impression    IMPRESSION:  1.  No evidence for pulmonary embolism.  2.  Masslike consolidation in the left upper lobe has increased in  prominence, and is suspicious for metastatic disease, although an  infectious process could also possibly have this appearance.  3.  Complete atelectasis of the left lower lobe has increased since  the previous exam.  4.  Mediastinal, right axillary, and retroperitoneal adenopathy is new  since the previous exam,  and is suspicious for metastatic disease.  5.  Mild age-indeterminate anterior compression and sclerosis of the  T8 vertebral body is new since the previous exam. This could represent  an acute compression fracture, possibly pathologic. Please clinically  correlate.  6.  Small left pleural effusion has increased slightly since the  previous exam.    ALONDRA PAZ MD         SYSTEM ID:  [id number]        Consultations:  Consultation during this admission received from oncology.     Recent Lab Results:  Recent Labs   Lab 10/21/21  0956   WBC 10.1   HGB 13.7   HCT 42.2   MCV 85        Recent Labs   Lab 10/22/21  1415 10/22/21  0602 10/22/21  0141 10/21/21  1631 10/21/21  0956   NA  --  131*  --   --  132*   POTASSIUM  --  4.3  --   --  3.9   CHLORIDE  --  99  --   --  97   CO2  --  23  --   --  25   ANIONGAP  --  9  --   --  10   * 107* 125*   < > 110*   BUN  --  22  --   --  19   CR  --  0.73  --   --  0.77   GFRESTIMATED  --  87  --   --  82   CAROLYN  --  9.0  --   --  8.7    < > = values in this interval not displayed.     Recent Labs   Lab 10/22/21  1415 10/22/21  0602 10/22/21  0141 10/21/21  2101 10/21/21  1631   * 107* 125* 124* 114*          Pending Results:    Unresulted Labs Ordered in the Past 30 Days of this Admission     No orders found from 9/21/2021 to 10/22/2021.              Reason for your hospital stay    Breast cancer with metastasis to the brain     Follow-up and recommended labs and tests     Follow up with primary care provider, [facility], within 7 days for hospital follow- up.    Follow-up with oncologist as instructed as an outpatient  Follow-up with radiation oncologist for radiotherapy/gamma knife on 10/26/2021     Activity    Your activity upon discharge: activity as tolerated     Diet    Follow this diet upon discharge: Orders Placed This Encounter      Snacks/Supplements Adult: Other; Ensure Enlive + ice cream; Between Meals      Combination Diet  Regular Diet Adult       Hospital Course:  Lorna Guzman is a 66-year-old female admitted yesterday. She has a past medical history significant for metastatic breast cancer, recently diagnosed with brain metastasis, cardiomyopathy secondary to chemotherapy, pulmonary embolism, currently on Xarelto, who presented to the emergency room for evaluation of right facial and eye drop and diplopia.  The patient had an MRI of the brain last week as an outpatient for a routine screening. She had no symptoms at the time, and the result came back came back positive for brain metastasis. The report is not available to review as it was done at [facility]. After the result of the MRI, she was started on dexamethasone 4 mg twice a day.  Patient was was seen by radiation oncologist and  a plan for her to start radiation therapy at [facility] on 10/26 .  Her oncologist is Dr. Lua. Agnes developed double vision 5 days ago and a drooping of the right eye lid 2 days ago.  on the day of admission she called her clinic and  presented to the emergency room.  She denied any headache, no nausea or vomiting.  No history of seizure.  She has increased shortness of breath with exertion associated with intermittent mild coughing.  History of PE in the past and is on Xarelto.  She denied any fever, chills or sore throat.  She is vaccinated for COVID-19 and also received her booster dose.    She was evaluated, treated with decadron 4 mg TID. Oncologist consulted and recommended that she could be discharged to follow up as an outpatient with her radiotherapy on 10/26.  I discussed with patient and with her  and discharged home.     Total time spent in face to face contact with the patient and coordinating discharge was:  30 Minutes.

## 2021-10-26 ENCOUNTER — TRANSFERRED RECORDS (OUTPATIENT)
Dept: HEALTH INFORMATION MANAGEMENT | Facility: CLINIC | Age: 66
End: 2021-10-26
Payer: MEDICARE

## 2021-10-27 ENCOUNTER — TRANSFERRED RECORDS (OUTPATIENT)
Dept: HEALTH INFORMATION MANAGEMENT | Facility: CLINIC | Age: 66
End: 2021-10-27

## 2021-10-27 ENCOUNTER — APPOINTMENT (OUTPATIENT)
Dept: CT IMAGING | Facility: CLINIC | Age: 66
End: 2021-10-27
Attending: EMERGENCY MEDICINE
Payer: MEDICARE

## 2021-10-27 ENCOUNTER — HOSPITAL ENCOUNTER (OUTPATIENT)
Facility: CLINIC | Age: 66
Setting detail: OBSERVATION
Discharge: HOME | End: 2021-10-27
Attending: EMERGENCY MEDICINE | Admitting: HOSPITALIST
Payer: MEDICARE

## 2021-10-27 ENCOUNTER — APPOINTMENT (OUTPATIENT)
Dept: GENERAL RADIOLOGY | Facility: CLINIC | Age: 66
End: 2021-10-27
Attending: EMERGENCY MEDICINE
Payer: MEDICARE

## 2021-10-27 ENCOUNTER — APPOINTMENT (OUTPATIENT)
Dept: SPEECH THERAPY | Facility: CLINIC | Age: 66
End: 2021-10-27
Attending: HOSPITALIST
Payer: MEDICARE

## 2021-10-27 ENCOUNTER — APPOINTMENT (OUTPATIENT)
Dept: MRI IMAGING | Facility: CLINIC | Age: 66
End: 2021-10-27
Attending: HOSPITALIST
Payer: MEDICARE

## 2021-10-27 VITALS
BODY MASS INDEX: 17.33 KG/M2 | OXYGEN SATURATION: 94 % | SYSTOLIC BLOOD PRESSURE: 137 MMHG | HEIGHT: 69 IN | WEIGHT: 117 LBS | HEART RATE: 64 BPM | TEMPERATURE: 97.6 F | DIASTOLIC BLOOD PRESSURE: 83 MMHG | RESPIRATION RATE: 16 BRPM

## 2021-10-27 DIAGNOSIS — R13.10 DYSPHAGIA, UNSPECIFIED TYPE: Primary | ICD-10-CM

## 2021-10-27 DIAGNOSIS — R06.02 SHORTNESS OF BREATH: ICD-10-CM

## 2021-10-27 DIAGNOSIS — E87.1 HYPONATREMIA: ICD-10-CM

## 2021-10-27 DIAGNOSIS — C50.919 METASTATIC BREAST CANCER: ICD-10-CM

## 2021-10-27 LAB
ANION GAP SERPL CALCULATED.3IONS-SCNC: 9 MMOL/L (ref 3–14)
ANION GAP SERPL CALCULATED.3IONS-SCNC: 9 MMOL/L (ref 3–14)
ATRIAL RATE - MUSE: 97 BPM
BASOPHILS # BLD AUTO: 0 10E3/UL (ref 0–0.2)
BASOPHILS NFR BLD AUTO: 0 %
BUN SERPL-MCNC: 24 MG/DL (ref 7–30)
BUN SERPL-MCNC: 32 MG/DL (ref 7–30)
CALCIUM SERPL-MCNC: 8.1 MG/DL (ref 8.5–10.1)
CALCIUM SERPL-MCNC: 8.7 MG/DL (ref 8.5–10.1)
CHLORIDE BLD-SCNC: 93 MMOL/L (ref 94–109)
CHLORIDE BLD-SCNC: 98 MMOL/L (ref 94–109)
CO2 SERPL-SCNC: 20 MMOL/L (ref 20–32)
CO2 SERPL-SCNC: 22 MMOL/L (ref 20–32)
CREAT SERPL-MCNC: 0.6 MG/DL (ref 0.52–1.04)
CREAT SERPL-MCNC: 0.75 MG/DL (ref 0.52–1.04)
DIASTOLIC BLOOD PRESSURE - MUSE: NORMAL MMHG
EOSINOPHIL # BLD AUTO: 0 10E3/UL (ref 0–0.7)
EOSINOPHIL NFR BLD AUTO: 0 %
ERYTHROCYTE [DISTWIDTH] IN BLOOD BY AUTOMATED COUNT: 13.3 % (ref 10–15)
FLUAV RNA SPEC QL NAA+PROBE: NEGATIVE
FLUBV RNA RESP QL NAA+PROBE: NEGATIVE
GFR SERPL CREATININE-BSD FRML MDRD: 85 ML/MIN/1.73M2
GFR SERPL CREATININE-BSD FRML MDRD: >90 ML/MIN/1.73M2
GLUCOSE BLD-MCNC: 125 MG/DL (ref 70–99)
GLUCOSE BLD-MCNC: 98 MG/DL (ref 70–99)
HCT VFR BLD AUTO: 44 % (ref 35–47)
HGB BLD-MCNC: 14.5 G/DL (ref 11.7–15.7)
HOLD SPECIMEN: NORMAL
IMM GRANULOCYTES # BLD: 0.2 10E3/UL
IMM GRANULOCYTES NFR BLD: 2 %
INTERPRETATION ECG - MUSE: NORMAL
LYMPHOCYTES # BLD AUTO: 1 10E3/UL (ref 0.8–5.3)
LYMPHOCYTES NFR BLD AUTO: 10 %
MCH RBC QN AUTO: 27.9 PG (ref 26.5–33)
MCHC RBC AUTO-ENTMCNC: 33 G/DL (ref 31.5–36.5)
MCV RBC AUTO: 85 FL (ref 78–100)
MONOCYTES # BLD AUTO: 0.8 10E3/UL (ref 0–1.3)
MONOCYTES NFR BLD AUTO: 8 %
NEUTROPHILS # BLD AUTO: 8.5 10E3/UL (ref 1.6–8.3)
NEUTROPHILS NFR BLD AUTO: 80 %
NRBC # BLD AUTO: 0 10E3/UL
NRBC BLD AUTO-RTO: 0 /100
P AXIS - MUSE: 51 DEGREES
PLATELET # BLD AUTO: 216 10E3/UL (ref 150–450)
POTASSIUM BLD-SCNC: 4.6 MMOL/L (ref 3.4–5.3)
POTASSIUM BLD-SCNC: 4.9 MMOL/L (ref 3.4–5.3)
PR INTERVAL - MUSE: 188 MS
QRS DURATION - MUSE: 144 MS
QT - MUSE: 358 MS
QTC - MUSE: 454 MS
R AXIS - MUSE: -3 DEGREES
RBC # BLD AUTO: 5.2 10E6/UL (ref 3.8–5.2)
SARS-COV-2 RNA RESP QL NAA+PROBE: NEGATIVE
SODIUM SERPL-SCNC: 124 MMOL/L (ref 133–144)
SODIUM SERPL-SCNC: 127 MMOL/L (ref 133–144)
SYSTOLIC BLOOD PRESSURE - MUSE: NORMAL MMHG
T AXIS - MUSE: 108 DEGREES
TROPONIN I SERPL-MCNC: <0.015 UG/L (ref 0–0.04)
VENTRICULAR RATE- MUSE: 97 BPM
WBC # BLD AUTO: 10.5 10E3/UL (ref 4–11)

## 2021-10-27 PROCEDURE — 99285 EMERGENCY DEPT VISIT HI MDM: CPT | Mod: 25

## 2021-10-27 PROCEDURE — 255N000002 HC RX 255 OP 636: Performed by: HOSPITALIST

## 2021-10-27 PROCEDURE — G0378 HOSPITAL OBSERVATION PER HR: HCPCS

## 2021-10-27 PROCEDURE — 258N000003 HC RX IP 258 OP 636: Performed by: HOSPITALIST

## 2021-10-27 PROCEDURE — 80048 BASIC METABOLIC PNL TOTAL CA: CPT | Performed by: HOSPITALIST

## 2021-10-27 PROCEDURE — 71275 CT ANGIOGRAPHY CHEST: CPT | Mod: MG

## 2021-10-27 PROCEDURE — 258N000003 HC RX IP 258 OP 636: Performed by: EMERGENCY MEDICINE

## 2021-10-27 PROCEDURE — 96361 HYDRATE IV INFUSION ADD-ON: CPT

## 2021-10-27 PROCEDURE — 96376 TX/PRO/DX INJ SAME DRUG ADON: CPT | Mod: 59

## 2021-10-27 PROCEDURE — C9803 HOPD COVID-19 SPEC COLLECT: HCPCS

## 2021-10-27 PROCEDURE — 250N000011 HC RX IP 250 OP 636: Performed by: HOSPITALIST

## 2021-10-27 PROCEDURE — 250N000009 HC RX 250: Performed by: EMERGENCY MEDICINE

## 2021-10-27 PROCEDURE — 96374 THER/PROPH/DIAG INJ IV PUSH: CPT | Mod: 59

## 2021-10-27 PROCEDURE — A9585 GADOBUTROL INJECTION: HCPCS | Performed by: HOSPITALIST

## 2021-10-27 PROCEDURE — 70553 MRI BRAIN STEM W/O & W/DYE: CPT | Mod: MG

## 2021-10-27 PROCEDURE — 93005 ELECTROCARDIOGRAM TRACING: CPT

## 2021-10-27 PROCEDURE — 80048 BASIC METABOLIC PNL TOTAL CA: CPT | Mod: 91 | Performed by: EMERGENCY MEDICINE

## 2021-10-27 PROCEDURE — 87636 SARSCOV2 & INF A&B AMP PRB: CPT | Performed by: EMERGENCY MEDICINE

## 2021-10-27 PROCEDURE — 250N000011 HC RX IP 250 OP 636: Performed by: EMERGENCY MEDICINE

## 2021-10-27 PROCEDURE — 92610 EVALUATE SWALLOWING FUNCTION: CPT | Mod: GN

## 2021-10-27 PROCEDURE — 99234 HOSP IP/OBS SM DT SF/LOW 45: CPT | Performed by: HOSPITALIST

## 2021-10-27 PROCEDURE — 250N000013 HC RX MED GY IP 250 OP 250 PS 637: Performed by: EMERGENCY MEDICINE

## 2021-10-27 PROCEDURE — 999N000105 HC STATISTIC NO DOCUMENTATION TO SUPPORT CHARGE

## 2021-10-27 PROCEDURE — 36415 COLL VENOUS BLD VENIPUNCTURE: CPT | Performed by: EMERGENCY MEDICINE

## 2021-10-27 PROCEDURE — 36415 COLL VENOUS BLD VENIPUNCTURE: CPT | Performed by: HOSPITALIST

## 2021-10-27 PROCEDURE — 84484 ASSAY OF TROPONIN QUANT: CPT | Performed by: EMERGENCY MEDICINE

## 2021-10-27 PROCEDURE — 71045 X-RAY EXAM CHEST 1 VIEW: CPT

## 2021-10-27 PROCEDURE — 85025 COMPLETE CBC W/AUTO DIFF WBC: CPT | Performed by: EMERGENCY MEDICINE

## 2021-10-27 PROCEDURE — 99207 PR NO BILLABLE SERVICE THIS VISIT: CPT | Performed by: INTERNAL MEDICINE

## 2021-10-27 RX ORDER — ACETAMINOPHEN 325 MG/1
650 TABLET ORAL EVERY 6 HOURS PRN
Status: DISCONTINUED | OUTPATIENT
Start: 2021-10-27 | End: 2021-10-27 | Stop reason: HOSPADM

## 2021-10-27 RX ORDER — FAMOTIDINE 20 MG/1
20 TABLET, FILM COATED ORAL DAILY
COMMUNITY

## 2021-10-27 RX ORDER — ACETAMINOPHEN 650 MG/1
650 SUPPOSITORY RECTAL EVERY 6 HOURS PRN
Status: DISCONTINUED | OUTPATIENT
Start: 2021-10-27 | End: 2021-10-27 | Stop reason: HOSPADM

## 2021-10-27 RX ORDER — SODIUM CHLORIDE 9 MG/ML
1000 INJECTION, SOLUTION INTRAVENOUS CONTINUOUS
Status: DISCONTINUED | OUTPATIENT
Start: 2021-10-27 | End: 2021-10-27

## 2021-10-27 RX ORDER — AMOXICILLIN 250 MG
1 CAPSULE ORAL 2 TIMES DAILY PRN
Status: DISCONTINUED | OUTPATIENT
Start: 2021-10-27 | End: 2021-10-27 | Stop reason: HOSPADM

## 2021-10-27 RX ORDER — GABAPENTIN 100 MG/1
100 CAPSULE ORAL 3 TIMES DAILY
COMMUNITY

## 2021-10-27 RX ORDER — AMOXICILLIN 250 MG
2 CAPSULE ORAL 2 TIMES DAILY PRN
Status: DISCONTINUED | OUTPATIENT
Start: 2021-10-27 | End: 2021-10-27 | Stop reason: HOSPADM

## 2021-10-27 RX ORDER — POLYETHYLENE GLYCOL 3350 17 G/17G
17 POWDER, FOR SOLUTION ORAL DAILY PRN
Status: DISCONTINUED | OUTPATIENT
Start: 2021-10-27 | End: 2021-10-27 | Stop reason: HOSPADM

## 2021-10-27 RX ORDER — IOPAMIDOL 755 MG/ML
500 INJECTION, SOLUTION INTRAVASCULAR ONCE
Status: COMPLETED | OUTPATIENT
Start: 2021-10-27 | End: 2021-10-27

## 2021-10-27 RX ORDER — ONDANSETRON 4 MG/1
4 TABLET, ORALLY DISINTEGRATING ORAL EVERY 6 HOURS PRN
Status: DISCONTINUED | OUTPATIENT
Start: 2021-10-27 | End: 2021-10-27 | Stop reason: HOSPADM

## 2021-10-27 RX ORDER — HYDROMORPHONE HCL IN WATER/PF 6 MG/30 ML
0.2 PATIENT CONTROLLED ANALGESIA SYRINGE INTRAVENOUS
Status: DISCONTINUED | OUTPATIENT
Start: 2021-10-27 | End: 2021-10-27 | Stop reason: HOSPADM

## 2021-10-27 RX ORDER — IBUPROFEN 100 MG/5ML
400 SUSPENSION, ORAL (FINAL DOSE FORM) ORAL ONCE
Status: COMPLETED | OUTPATIENT
Start: 2021-10-27 | End: 2021-10-27

## 2021-10-27 RX ORDER — IPRATROPIUM BROMIDE AND ALBUTEROL SULFATE 2.5; .5 MG/3ML; MG/3ML
3 SOLUTION RESPIRATORY (INHALATION)
Status: DISCONTINUED | OUTPATIENT
Start: 2021-10-27 | End: 2021-10-27

## 2021-10-27 RX ORDER — SODIUM CHLORIDE 9 MG/ML
INJECTION, SOLUTION INTRAVENOUS CONTINUOUS
Status: DISCONTINUED | OUTPATIENT
Start: 2021-10-27 | End: 2021-10-27 | Stop reason: HOSPADM

## 2021-10-27 RX ORDER — BISACODYL 10 MG
10 SUPPOSITORY, RECTAL RECTAL DAILY PRN
Status: DISCONTINUED | OUTPATIENT
Start: 2021-10-27 | End: 2021-10-27 | Stop reason: HOSPADM

## 2021-10-27 RX ORDER — ONDANSETRON 2 MG/ML
4 INJECTION INTRAMUSCULAR; INTRAVENOUS EVERY 6 HOURS PRN
Status: DISCONTINUED | OUTPATIENT
Start: 2021-10-27 | End: 2021-10-27 | Stop reason: HOSPADM

## 2021-10-27 RX ORDER — GADOBUTROL 604.72 MG/ML
7.5 INJECTION INTRAVENOUS ONCE
Status: COMPLETED | OUTPATIENT
Start: 2021-10-27 | End: 2021-10-27

## 2021-10-27 RX ORDER — OXYCODONE HYDROCHLORIDE 5 MG/1
5 TABLET ORAL EVERY 4 HOURS PRN
Status: DISCONTINUED | OUTPATIENT
Start: 2021-10-27 | End: 2021-10-27 | Stop reason: HOSPADM

## 2021-10-27 RX ADMIN — SODIUM CHLORIDE: 9 INJECTION, SOLUTION INTRAVENOUS at 08:27

## 2021-10-27 RX ADMIN — GADOBUTROL 5 ML: 604.72 INJECTION INTRAVENOUS at 10:54

## 2021-10-27 RX ADMIN — HYDROMORPHONE HYDROCHLORIDE 0.2 MG: 0.2 INJECTION, SOLUTION INTRAMUSCULAR; INTRAVENOUS; SUBCUTANEOUS at 07:38

## 2021-10-27 RX ADMIN — SODIUM CHLORIDE 500 ML: 9 INJECTION, SOLUTION INTRAVENOUS at 01:57

## 2021-10-27 RX ADMIN — SODIUM CHLORIDE 72 ML: 9 INJECTION, SOLUTION INTRAVENOUS at 03:42

## 2021-10-27 RX ADMIN — IBUPROFEN 400 MG: 200 SUSPENSION ORAL at 01:53

## 2021-10-27 RX ADMIN — HYDROMORPHONE HYDROCHLORIDE 0.2 MG: 0.2 INJECTION, SOLUTION INTRAMUSCULAR; INTRAVENOUS; SUBCUTANEOUS at 12:07

## 2021-10-27 RX ADMIN — SODIUM CHLORIDE: 9 INJECTION, SOLUTION INTRAVENOUS at 07:37

## 2021-10-27 RX ADMIN — SODIUM CHLORIDE: 9 INJECTION, SOLUTION INTRAVENOUS at 12:26

## 2021-10-27 RX ADMIN — IOPAMIDOL 51 ML: 755 INJECTION, SOLUTION INTRAVENOUS at 03:42

## 2021-10-27 RX ADMIN — IPRATROPIUM BROMIDE AND ALBUTEROL SULFATE 3 ML: .5; 3 SOLUTION RESPIRATORY (INHALATION) at 01:58

## 2021-10-27 ASSESSMENT — ENCOUNTER SYMPTOMS
DIFFICULTY URINATING: 0
TROUBLE SWALLOWING: 1
SHORTNESS OF BREATH: 1
COUGH: 1
WEAKNESS: 1
DYSURIA: 0
FEVER: 0

## 2021-10-27 ASSESSMENT — MIFFLIN-ST. JEOR: SCORE: 1145.09

## 2021-10-27 ASSESSMENT — ACTIVITIES OF DAILY LIVING (ADL): DEPENDENT_IADLS:: COOKING;LAUNDRY;SHOPPING;TRANSPORTATION

## 2021-10-27 NOTE — H&P
[facility]  Hospitalist H&P    Name: Lorna Guzman      MRN: [id number]  YOB: 1955    Age: 66 year old  Date of admission: 10/27/2021  Primary care provider: [facility]            Assessment and Plan:     Lorna Guzman is a 66 year old female with a history of metastatic breast cancer with involvement of the brain currently on chemotherapy and brain radiation, PE on Xarelto, recovered cardiomyopathy who presents with difficulty swallowing.    Patient was just hospitalized not even 1 week ago.  She presented with right facial and eye droop with diplopia.  Brain MRI confirmed metastatic disease.  She was started on dexamethasone and she had her first session of brain radiation yesterday.  She is followed by Dr. Lua of [facility].  Over the past few days she has had mild shortness of breath but worsening progression of her facial droop and right arm weakness with near paralysis.  She indicates that soft foods or getting stuck and she feels as though she is choking and has thick oral secretions.  She has had limited ability to handle food and water.  Brain MRI was supposed to be performed on 10/27 for reevaluation but due to progression of symptoms she comes to the ED with her  today.    Here her temperature is 97.6, heart rate 86, blood pressure 126/87, respiratory 18 and oxygen saturation 96% on room air.  Laboratory work shows sodium of 124, chloride 93, BUN 32, blood sugar 125.  Normal CBC.  EKG shows normal sinus rhythm.  Chest x-ray shows no acute findings.  CT of the chest is pending.  She will be admitted for further evaluation.    1. Dysphagia: Unclear etiology.  CT of the chest is pending.  For now we will place n.p.o. and request SLP evaluation.  If no clear etiology and symptoms persist might request GI consultation.  I see no evidence of thrush or mucositis.  She tells me she has been able to tolerate her medication at home except for  her Bactrim which is used for prophylaxis in the context of high-dose steroids.  Might discuss with pharmacy if there is an alternative formulation of this medication.  2. Hypovolemic hyponatremia: Start normal saline at 100 cc/h and recheck sodium in the morning.  This is likely due to limited oral intake over the past several days.  I also suspect it is acute rather than chronic.  3. Progressive right facial droop and right arm weakness: Likely due to evolving brain metastases.  She also had vasogenic edema seen on recent head CT.  We will continue steroids for now.  Recheck brain MRI as this was the plan as an outpatient.  Might be reasonable to investigate if brain radiation can be continued while hospitalized.  4. Metastatic breast cancer: Request oncology consultation.  Will hold prior to admission trastuzumab and capecitabine for now given observation status.  5. History of PE: Continue prior to admission Xarelto.    Code status: DNR, discussed with patient and family member.  Admit to observation status.  Prophylaxis: Xarelto.  Disposition: Home 2 days.            Chief Complaint:     Difficulty swallowing.         History of Present Illness:   Lorna Guzman is a 66 year old female who presents with difficulty swallowing.  History was obtained from my discussion with the patient at the bedside.  I also discussed the case with the ED provider.  The electronic medical record was also reviewed.    Patient was just hospitalized not even 1 week ago.  She presented with right facial and eye droop with diplopia.  Brain MRI confirmed metastatic disease.  She was started on dexamethasone and she had her first session of brain radiation yesterday.  She is followed by Dr. Lua of [facility].  Over the past few days she has had mild shortness of breath but worsening progression of her facial droop and right arm weakness with near paralysis.  She indicates that soft foods or getting stuck and she feels as though she is  choking and has thick oral secretions.  She has had limited ability to handle food and water.  Brain MRI was supposed to be performed on 10/27 for reevaluation but due to progression of symptoms she comes to the ED with her  today.    Here her temperature is 97.6, heart rate 86, blood pressure 126/87, respiratory 18 and oxygen saturation 96% on room air.  Laboratory work shows sodium of 124, chloride 93, BUN 32, blood sugar 125.  Normal CBC.  EKG shows normal sinus rhythm.  Chest x-ray shows no acute findings.  CT of the chest is pending.  She will be admitted for further evaluation.            Past Medical History:     Past Medical History:   Diagnosis Date     Cancer (H)     breast     Cardiomyopathy (H)      Mitral regurgitation 3/19/2020     MVP (mitral valve prolapse) 3/19/2020     Thrombosis 02/2021    DVT             Past Surgical History:     Past Surgical History:   Procedure Laterality Date     BIOPSY SKIN (LOCATION) Right 10/5/2021    Procedure: BIOPSY, SKIN, right chest x3;  Surgeon: Nino Castellano MD;  Location: [facility] OR     BREAST SURGERY      March 2006     MASTECTOMY SIMPLE Right 10/24/2019    Procedure: RIGHT MASTECTOMY;  Surgeon: Nino Castellano MD;  Location: [facility] OR             Social History:     Social History     Tobacco Use     Smoking status: Never Smoker     Smokeless tobacco: Never Used   Substance Use Topics     Alcohol use: Yes     Alcohol/week: 0.0 standard drinks     Comment: rarely              Family History:   The family history was fully reviewed and non-contributory in this case.         Allergies:     Allergies   Allergen Reactions     Lisinopril Hives and Swelling     Venlafaxine Hives and Swelling     Possibly allergy             Medications:     Prior to Admission medications    Medication Sig Last Dose Taking? Auth Provider   ADO-trastuzumab Inject into the vein every 21 days  10/26/2021 Yes Reported, Patient   capecitabine (XELODA) 500 MG tablet Take 1,500 mg by mouth 2  "times daily On days 1-14 of each 21 day cycle 10/26/2021 Yes Unknown, Entered By History   carvedilol (COREG) 12.5 MG tablet Take 1 tablet (12.5 mg) by mouth 2 times daily (with meals) Increased dose. Please fill. 10/26/2021 at Unknown time Yes Melisa Billingsley PA-C   dexamethasone (DECADRON) 4 MG tablet Take 4 mg by mouth 3 times daily 10/26/2021 Yes Unknown, Entered By History   pantoprazole (PROTONIX) 40 MG EC tablet Take 40 mg by mouth daily 10/26/2021 Yes Unknown, Entered By History   rivaroxaban ANTICOAGULANT (XARELTO ANTICOAGULANT) 20 MG TABS tablet Take 20 mg by mouth daily  10/26/2021 Yes Melisa Billingsley PA-C   sulfamethoxazole-trimethoprim (BACTRIM DS) 800-160 MG tablet Take 1 tablet by mouth three times a week 10/26/2021 Yes Unknown, Entered By History   tucatinib (TUKYSA) 150 MG tablet Take 300 mg by mouth 2 times daily 10/26/2021 Yes Unknown, Entered By History             Review of Systems:     A Comprehensive greater than 10 system review of systems was carried out.  Pertinent positives and negatives are noted above.  Otherwise negative for contributory information.           Physical Exam:   Blood pressure 126/87, pulse 91, temperature 97.6  F (36.4  C), temperature source Oral, resp. rate 28, height 1.753 m (5' 9\"), weight 53.1 kg (117 lb), SpO2 95 %, not currently breastfeeding.  Wt Readings from Last 1 Encounters:   10/27/21 53.1 kg (117 lb)     Exam:  GENERAL: No apparent distress. Awake, alert, and fully oriented.  HEENT: Normocephalic, atraumatic. Extraocular movements intact.  CARDIOVASCULAR: Regular rate and rhythm without murmurs or rubs. No S3.  PULMONARY: Clear to auscultation bilaterally.  ABDOMINAL: Soft, non-tender, non-distended. Bowel sounds normoactive.   EXTREMITIES: No cyanosis or clubbing. No appreciable edema.  NEUROLOGICAL: Facial droop and right sided weakness of arm.  DERMATOLOGICAL: No rash, ulcer, bruising, nor jaundice.          Data:   EKG:  Personally " reviewed.     Laboratory:  Recent Labs   Lab 10/27/21  0058 10/21/21  0956   WBC 10.5 10.1   HGB 14.5 13.7   HCT 44.0 42.2   MCV 85 85    254     Recent Labs   Lab 10/27/21  0058 10/22/21  1415 10/22/21  0602 10/21/21  1631 10/21/21  0956   *  --  131*  --  132*   POTASSIUM 4.9  --  4.3  --  3.9   CHLORIDE 93*  --  99  --  97   CO2 22  --  23  --  25   ANIONGAP 9  --  9  --  10   * 144* 107*   < > 110*   BUN 32*  --  22  --  19   CR 0.75  --  0.73  --  0.77   GFRESTIMATED 85  --  87  --  82   CAROLYN 8.7  --  9.0  --  8.7    < > = values in this interval not displayed.     No results for input(s): CULT in the last 168 hours.    Imaging:  Recent Results (from the past 24 hour(s))   XR Chest Port 1 View    Narrative    EXAM: XR CHEST PORT 1 VIEW  LOCATION: [facility]  DATE/TIME: 10/27/2021 2:27 AM    INDICATION: cough, shortness of breath  COMPARISON: CT chest dated 10/21/2021      Impression    IMPRESSION: Heart size is likely stable. Thoracic aorta is tortuous. Elevation of the left hemidiaphragm, with left perihilar opacity and left-sided volume loss likely unchanged. Probable trace left effusion. Right lung is clear of acute infiltrates.   Peripheral right upper lobe chronic scarring redemonstrated. Thoracolumbar scoliosis again noted.       Michael Sanchez DO MPH  [facility] Hospitalist  [address] [place] [address]  10/27/2021

## 2021-10-27 NOTE — ED PROVIDER NOTES
"  History   Chief Complaint:  Shortness of Breath       HPI   Lorna Guzman is a 66 year old female on Xarelto with history of metastatic breast cancer who presents with cough that is keeping her from being able to sleep, shortness of breath, and difficulty swallowing her medications.  Additionally, she has known brain mets and started radiation therapy today.  She reports concern over continued rapid progression of the right-sided facial and upper extremity weakness.  She states that she was able to take her medications today, but does not feel that she will be able to continue to take oral pills.  She denies fever.  She denies chest pain.  She reports poor oral intake and dark urine output, but no urinary symptoms.    Review of Systems   Constitutional: Negative for fever.   HENT: Positive for trouble swallowing.    Respiratory: Positive for cough and shortness of breath.    Cardiovascular: Negative for chest pain.   Genitourinary: Negative for difficulty urinating and dysuria.   Neurological: Positive for weakness (R face, R arm).   All other systems reviewed and are negative.        Allergies:  Lisinopril  Venlafaxine    Medications:  ADO-trastuzumab  Capecitabine  Carvedilol  Decadron  Pantoprazol  Xarelto  Bactrim  Tukysa    Past Medical History:     Cardiomyopathy  Breast cancer  Hyperlipidemia  Mitral regurgitation  Mitral valve prolapse  Thrombosis  Acute respiratory failure  Non-STEMI  Acute PE  Vasogenic brain edema    Past Surgical History:    Right breast lumpectomy  Skin biopsy x3  Right mastectomy     Family History:    Father: Hypertension  Mother: Ovarian cancer, breast cancer, diabetes  Sister: Breast cancer    Social History:  Presents to the emergency department with her   Arrives via car    Physical Exam     Patient Vitals for the past 24 hrs:   BP Temp Temp src Pulse Resp SpO2 Height Weight   10/27/21 0035 126/87 97.6  F (36.4  C) Oral 105 18 96 % 1.753 m (5' 9\") 53.1 kg (117 lb) "       Physical Exam    General: Lean habitus.  Appears to feel poorly.  HENT: Unable to open her right eyelid.  Right-sided facial droop.   Mouth/Throat: . Oral mucosa moist.  Speaking with a normal voice.  Handling secretions.   Eyes: Conjunctivae are normal. No scleral icterus.  Neck: Neck supple. No cervical adenopathy  Cardiovascular: Borderline tachycardic rate, regular rhythm and intact distal pulses.    Pulmonary/Chest: Mild increased work of breathing.  Decreased breath sounds on the right.  Diffuse scattered expiratory wheezes.  Diffuse scattered coarse breath sounds..   Abdominal: Soft.  No distension. There is no tenderness.   Musculoskeletal:  No edema, No calf tenderness  Neurological:Alert and answering questions appropriately. Right sided upper extremities weakness- against gravity. Left upper and bilateral lower extremity strength intact.  Coordination normal.   Skin: Skin is warm and dry.   Psychiatric: Normal mood and affect.     Emergency Department Course   ECG  ECG obtained at 0150, ECG read at 0157  Normal sinus rhythm  Nonspecific intraventricular block  T wave abnormality, consider lateral ischemia  Motion artifact   Rate 97 bpm. KY interval 188 ms. QRS duration 144 ms. QT/QTc 358/454 ms. P-R-T axes 51 -3 108.     Imaging:  MR Brain w/o & w Contrast   Final Result   IMPRESSION:     1. A few new small cortical areas of restricted diffusion within both   cerebral hemispheres concerning for small cortical infarcts. There are   regions of infarct within the perirolandic regions bilaterally. No   evidence of hemorrhagic transformation of infarct. No midline shift or   herniation.   2. Enhancing cerebellar metastases appear to be slightly increased in   size since previous MR 10/14/2021. Surrounding T2 hyperintense edema   is not significantly changed. No significant mass effect or   compression of the fourth ventricle.   3. No definite evidence of leptomeningeal metastases by brain MRI   although  this is not excluded by imaging.      ADELA MICHAEL MD            SYSTEM ID:  [id number]      CT Chest Pulmonary Embolism w Contrast   Final Result   IMPRESSION:   1.  Minimal interval advancement of oblique compression to the anterior aspect of the T8 vertebral body with sclerotic densities remaining stable.      2.  No evidence for pulmonary embolism.      3.  Unchanged reticulonodular infiltrates left upper lobe with consolidation and left suprahilar mass remaining stable. Complete collapse left lower lobe, unchanged.      4.  Unchanged mediastinal lymphadenopathy.         XR Chest Port 1 View   Final Result   IMPRESSION: Heart size is likely stable. Thoracic aorta is tortuous. Elevation of the left hemidiaphragm, with left perihilar opacity and left-sided volume loss likely unchanged. Probable trace left effusion. Right lung is clear of acute infiltrates.    Peripheral right upper lobe chronic scarring redemonstrated. Thoracolumbar scoliosis again noted.        Report per radiology    Laboratory:  Labs Ordered and Resulted from Time of ED Arrival Up to the Time of Departure from the ED   BASIC METABOLIC PANEL - Abnormal; Notable for the following components:       Result Value    Sodium 124 (*)     Chloride 93 (*)     Urea Nitrogen 32 (*)     Glucose 125 (*)     All other components within normal limits   CBC WITH PLATELETS AND DIFFERENTIAL - Abnormal; Notable for the following components:    Absolute Neutrophils 8.5 (*)     Absolute Immature Granulocytes 0.2 (*)     All other components within normal limits   TROPONIN I - Normal   INFLUENZA A/B & SARS-COV2 PCR MULTIPLEX - Normal    Narrative:     Testing was performed using the jamaal SARS-CoV-2 & Influenza A/B Assay on the jamaal Gissell System. This test should be ordered for the detection of SARS-CoV-2 and influenza viruses in individuals who meet clinical and/or epidemiological criteria. Test performance is unknown in asymptomatic patients. This test is for in vitro  diagnostic use under the FDA EUA for laboratories certified under CLIA to perform moderate and/or high complexity testing. This test has not been FDA cleared or approved. A negative result does not rule out the presence of PCR inhibitors in the specimen or target RNA in concentration below the limit of detection for the assay. If only one viral target is positive but coinfection with multiple targets is suspected, the sample should be re-tested with another FDA cleared, approved or authorized test, if coinfection would change clinical management. [facility] are certified under the Clinical Laboratory Improvement Amendments of 1988 (CLIA-88) as  qualified to perform moderate and/or high complexity laboratory testing.   EXTRA BLUE TOP TUBE   EXTRA GREEN TOP (LITHIUM HEPARIN) TUBE   EXTRA PURPLE TOP TUBE   EXTRA RED TOP TUBE   PERIPHERAL IV CATHETER   PULSE OXIMETRY NURSING   CARDIAC CONTINUOUS MONITORING   PATIENT CARE ORDER   CBC WITH PLATELETS & DIFFERENTIAL    Narrative:     The following orders were created for panel order CBC with platelets differential.  Procedure                               Abnormality         Status                     ---------                               -----------         ------                     CBC with platelets and d...[[id number]]  Abnormal            Final result                 Please view results for these tests on the individual orders.   EXTRA TUBE    Narrative:     The following orders were created for panel order Extra Tube (Turney Draw).  Procedure                               Abnormality         Status                     ---------                               -----------         ------                     Extra Blue Top Tube[[id number]]                              Final result               Extra Red Top Tube[[id number]]                                                          Extra Green Top (Lithium...[[id number]]                      Final result                Extra Purple Top Tube[[id number]]                            Final result                 Please view results for these tests on the individual orders.        Emergency Department Course:  Reviewed:  I reviewed nursing notes, vitals, past medical history and Care Everywhere    Assessments:  0126 I obtained history and examined the patient as noted above.   0246 I rechecked the patient and explained findings.    Consults:  0250 I spoke with Dr. Migdalia Andrews, oncology, regarding the patient  0304 I spoke with Dr. Michael Sanchez, hospitalist, who agreed to admit the patient.     Interventions:  0153 Ibuprofen 400mg Oral  0157 NS bolus 500mL IV  0158 Duoneb 3mL Nebulization    Disposition:  The patient will board in the emergency department pending bed placement. Care was signed out to Dr. Sanchez.     Impression & Plan     CMS Diagnoses: None    Medical Decision Making:  Lorna Guzman is a 66 year old female with a history of metastatic breast cancer with known involvement of the lung and brain who presents with worsening shortness of breath, worsening right-sided facial and arm weakness, and difficulty swallowing.  ED evaluation is as noted above and remarkable for hyponatremia despite having received a IV fluid hydration in the oncology clinic yesterday.  CT of the chest revealed no pulmonary embolism and no significant changes in the lung and mediastinum findings it appeared to have a new T8 compression fracture.  She had no significant change in her breathing with a trial of a DuoNeb in the emergency department.  Plan to transfer the patient to observation for continued symptom management and likely evaluation by palliative care as well as adjustment of her medications to liquid medications that she can tolerate.  Additionally oncology plan for another MRI which will be performed to further evaluate the worsening weakness.  I spoken with Dr. Sanchez who is excepted ongoing care of the  patient.      Diagnosis:    ICD-10-CM    1. Shortness of breath  R06.02    2. Hyponatremia  E87.1    3. Dysphagia, unspecified type  R13.10    4. Metastatic breast cancer (H)  C50.919          Scribe Disclosure:  I, Jorge El, am serving as a scribe at 1:26 AM on 10/27/2021 to document services personally performed by Gayla Grayson MD based on my observations and the provider's statements to me.              Gayla Grayson MD  10/30/21 0148

## 2021-10-27 NOTE — CONSULTS
Care Management Initial Consult    General Information  Assessment completed with: Patient, Spouse or significant other, Leoncio  Type of CM/SW Visit: Initial Assessment    Primary Care Provider verified and updated as needed:     Readmission within the last 30 days:        Reason for Consult: discharge planning  Advance Care Planning:            Communication Assessment  Patient's communication style: spoken language (English or Bilingual)    Hearing Difficulty or Deaf: no   Wear Glasses or Blind: yes    Cognitive  Cognitive/Neuro/Behavioral:                        Living Environment:   People in home: spouse     Current living Arrangements: house - one level home setting.   Able to return to prior arrangements: yes       Family/Social Support:  Care provided by: self, spouse/significant other  Provides care for: no one  Marital Status:   , Children  Leoncio        Description of Support System: Supportive, Involved    Support Assessment: Adequate family and caregiver support, Adequate social supports    Current Resources:   Patient receiving home care services: No     Community Resources: None  Equipment currently used at home: walker, standard  Supplies currently used at home: None    Employment/Financial:  Employment Status: retired        Financial Concerns: No concerns identified   Referral to Financial Counselor: No       Lifestyle & Psychosocial Needs:  Social Determinants of Health     Tobacco Use: Low Risk      Smoking Tobacco Use: Never Smoker     Smokeless Tobacco Use: Never Used   Alcohol Use:      Frequency of Alcohol Consumption:      Average Number of Drinks:      Frequency of Binge Drinking:    Financial Resource Strain:      Difficulty of Paying Living Expenses:    Food Insecurity:      Worried About Running Out of Food in the Last Year:      Ran Out of Food in the Last Year:    Transportation Needs:      Lack of Transportation (Medical):      Lack of Transportation (Non-Medical):     Physical Activity:      Days of Exercise per Week:      Minutes of Exercise per Session:    Stress:      Feeling of Stress :    Social Connections:      Frequency of Communication with Friends and Family:      Frequency of Social Gatherings with Friends and Family:      Attends Hinduism Services:      Active Member of Clubs or Organizations:      Attends Club or Organization Meetings:      Marital Status:    Intimate Partner Violence:      Fear of Current or Ex-Partner:      Emotionally Abused:      Physically Abused:      Sexually Abused:    Depression:      PHQ-2 Score:    Housing Stability:      Unable to Pay for Housing in the Last Year:      Number of Places Lived in the Last Year:      Unstable Housing in the Last Year:        Functional Status:  Prior to admission patient needed assistance:   Dependent ADLs:: Bathing  Dependent IADLs:: Cooking, Laundry, Shopping, Transportation       Mental Health Status:  Mental Health Status: No Current Concerns       Chemical Dependency Status:  Chemical Dependency Status: No Current Concerns             Values/Beliefs:  Spiritual, Cultural Beliefs, Hinduism Practices, Values that affect care:                 Additional Information:  Met with pt and spouse.. Pt is followed by [facility] for her oncology needs. Pt has her 1st Radiation appt yesterday and has 2nd one schedule for today at 1545. Pt has been able to be home with the support of her spouse Leoncio. They just borrowed a walker yesterday as pt is having more balance issues.. Pt has a shower chair now as last week when she was in the tube, spouse had difficult time getting her out.. Pt has access to a old W/C should she need more support.. Pt /spouse both concerned about the sudden inability to swallow and difficulty managing secretions since yesterday. Leoncio expressed concerns about pt's lack of nutrition prior to yesterday.. PT will have Speech consult.. Not sure what recommendations will be for pt  She has been trying  to eat yogurt and easy foods to swallow but has poor caloric intake.    PT/Family have talked about [facility] as Leoncio's dad had volunteered at [facility].  PT is still restorative at this time but wants to know what the outcome of her MRI is and talk with Dr. Lua about next steps of support for pt.. Leoncio does believe he can still provide care for pt.. At this time she is NOT open to any outside support/services  SW waiting to see if pt will discharge from the ED to [facility] for her appt today vs admit to the medical floor.     Leoncio will call Oncology clinic after MRI completed. SW asked ED to keep SW staff updated should other needs arise      Corinne C. White, LSW

## 2021-10-27 NOTE — PROGRESS NOTES
Clinical Swallow Evaluation:   10/27/21 1208   General Information   Onset of Illness/Injury or Date of Surgery 10/27/21   Referring Physician Dr. Sanchez   Patient/Family Therapy Goal Statement (SLP) To figure out what's causing her dysphagia   Pertinent History of Current Problem   Lorna Guzman is a 66 year old female with a history of metastatic breast cancer with involvement of the brain currently on chemotherapy and brain radiation, PE on Xarelto, recovered cardiomyopathy who presents with difficulty swallowing. Patient was just hospitalized not even 1 week ago.  She presented with right facial and eye droop with diplopia.  Brain MRI confirmed metastatic disease. R sided eye droop, facial droop have significantly increased since that time and now near paralysis of R arm.     Addendum: MRI results in following assessment, revealing slightly increased cerebellar metastases as well as a few new small cortical areas of restricted diffusion within both cerebral hemispheres concerning for small cortical infarcts and regions of infarct within the perirolandic regions bilaterally.      General Observations   Pt alert, positioned upright,  present. Per discussion with RN, unsure if pt being admitted vs discharge for radiation therapy scheduled for today (her specific radiation cannot be completed at our facility).     Past History of Dysphagia No PMHx per pt/spouse. Dysphagia occuring ~ 1 week, coughing/choking with both liquids/solids, increased R facial weakness, globus sensation   Pain Assessment   Patient Currently in Pain Yes, see Vital Sign flowsheet   Type of Evaluation   Type of Evaluation Swallow Evaluation   Oral Motor   Oral Musculature anomalies present   Structural Abnormalities none present   Mucosal Quality dry   Dentition (Oral Motor)   Dentition (Oral Motor) natural dentition   Facial Symmetry (Oral Motor)   Facial Symmetry (Oral Motor) right side impairment   Right Side Facial Asymmetry  severe impairment   Lip Function (Oral Motor)   Lip Range of Motion (Oral Motor) protrusion impairment   Protrusion, Lip Range of Motion right side;minimal impairment   Tongue Function (Oral Motor)   Tongue ROM (Oral Motor) elevation is impaired;protrusion is impaired;lateralization is impaired   Elevation, Tongue ROM Impairment (Oral Motor) right side;moderate impairment   Protrusion, Tongue ROM Impairment (Oral Motor) right side;moderate impairment   Lateralization, Tongue ROM Impairment (Oral Motor) right side   Tongue Strength (Oral Motor) minimal impairment   Jaw Function (Oral Motor)   Jaw Function (Oral Motor) WNL   Cough/Swallow/Gag Reflex (Oral Motor)   Soft Palate/Velum (Oral Motor) elevation decreased on right   Volitional Throat Clear/Cough (Oral Motor) impaired;reduced strength   Volitional Swallow (Oral Motor) weak;effortful   Vocal Quality/Secretion Management (Oral Motor)   Vocal Quality (Oral Motor) hypophonic  (minimal)   Secretion Management (Oral Motor) difficulty swallowing secretions  (per pt report, chokes on saliva occassionally)   General Swallowing Observations   Current Diet/Method of Nutritional Intake (General Swallowing Observations, NIS) NPO   Respiratory Support (General Swallowing Observations) none   Swallowing Evaluation Clinical swallow evaluation   Clinical Swallow Evaluation   Feeding Assistance minimal assistance required   Clinical Swallow Evaluation Textures Trialed thin liquids;mildly thick liquids;pureed   Clinical Swallow Eval: Thin Liquid Texture Trial   Mode of Presentation, Thin Liquids spoon;cup   Volume of Liquid or Food Presented ice chips x4, small single cup sips x6   Oral Phase of Swallow Poor AP movement;Premature pharyngeal entry   Pharyngeal Phase of Swallow reduction in laryngeal movement;throat clearing;repeated swallows   Diagnostic Statement throat clearing on 100% ice chip trials, no overt clinical signs/sx aspiration with extremely small cup sips. x3-4  swallows per extremely small bolus   Clinical Swallow Eval: Mildly Thick Liquids   Mode of Presentation spoon;cup;self-fed;fed by clinician   Volume Presented tsp x5, cup sips x6   Oral Phase poor AP movement;premature pharyngeal entry   Pharyngeal Phase reduction in laryngeal movement;repeated swallows;throat clearing   Diagnostic Statement throat clearing on one full tsp bolus, no overt clinical signs/sx aspiration with extremely small tsp or cup sips.  x3-4 swallows per extremely small bolus    Clinical Swallow Evaluation: Puree Solid Texture Trial   Mode of Presentation, Puree spoon;self-fed   Volume of Puree Presented x3 tsp   Oral Phase, Puree Poor AP movement   Pharyngeal Phase, Puree reduction in laryngeal movement;feeling of something stuck in throat;repeated swallows   Diagnostic Statement Extremely small bites with slow A/P transit, x3-4 swallows per bite, globus sensation which resolves with thin liquid wash   Swallowing Recommendations   Diet Consistency Recommendations pureed (level 4);thin liquids (level 0)   Supervision Level for Intake close supervision needed   Mode of Delivery Recommendations bolus size, small;food moistened;slow rate of intake   Swallowing Maneuver Recommendations alternate food and liquid intake;double dry swallow;supraglottic swallow   Monitoring/Assistance Required (Eating/Swallowing) stop eating activities when fatigue is present;monitor for cough or change in vocal quality with intake   Recommended Feeding/Eating Techniques (Swallow Eval) maintain upright sitting position for eating;maintain upright posture during/after eating for 30 minutes;provide 6 smaller meals throughout day   Medication Administration Recommendations, Swallowing (SLP) Crush with puree and liquid wash if able. If unable to crush, ? change meds to liquid if able   Instrumental Assessment Recommendations VFSS (videofluroscopic swallowing study)  (as IP vs OP pending admission vs d/c home)   General  Therapy Interventions   Planned Therapy Interventions Dysphagia Treatment   SLP Therapy Assessment/Plan   Criteria for Skilled Therapeutic Interventions Met (SLP Eval) yes;treatment indicated   SLP Diagnosis moderate-severe oral and suspected pharyngeal dysphagia   Rehab Potential (SLP Eval) good, to achieve stated therapy goals   Therapy Frequency (SLP Eval) daily   Predicted Duration of Therapy Intervention (SLP Eval) 1-2 weeks   Comment, Therapy Assessment/Plan (SLP)   Clinical swallow evaluation completed. Pt presents with moderate-severe oral and suspected pharyngeal dysphagia: mod-severe R sided facial/lingual and suspected pharyngeal/laryngeal weakness, suspected cranial nerve impairment related to brain metastasis and radiation. Weak cough, mild hypophonic voice, significantly reduced laryngeal elevation to palpation. Pt demonstrated slow oral phase, suspected reduced oral control, supsected premature bolus spillage. With larger boluses (even a hernandez tsp), overt throat clearing. Improvements in tolerance with extremely small tsp or cup sips. Pt demonstrated x3-4 swallows per extremely small bolus and with puree solids reporeted globus sensation, which liquid wash clears. OK for cautious PO initiation, though requires strategies and further assessment via VFSS (pt/spouse in agreement) as IP vs OP pending admission vs discharge home.      Therapy Plan Review/Discharge Plan (SLP)   Therapy Plan Review (SLP) evaluation/treatment results reviewed;care plan/treatment goals reviewed;risks/benefits reviewed;current/potential barriers reviewed;participants voiced agreement with care plan;participants included;patient;spouse/significant other   SLP Discharge Planning    SLP Discharge Recommendation (DC Rec) home with outpatient speech therapy   SLP Rationale for DC Rec pt well below baseline of no dysphagia/regular diet, requiring diet modifications/strategies and will require ongoing testing (VFSS as IP vs OP  pending admission vs d/c home)   SLP Brief overview of current status    Recommendations: cautious initiation of puree solids, thin liquids via very small tsp or cup sips, alternate between solids/liquids consistently, allow time to swallow x3-4 times per bolus; thicken to mildly thick if needed. VFSS IP vs OP.      Total Evaluation Time   Total Evaluation Time (Minutes) 34

## 2021-10-27 NOTE — PLAN OF CARE
Speech Language Therapy Discharge Summary    Reason for therapy discharge:    Discharged to home.  Per patient/spouse, they would like to follow up with Dr. Cr before deciding if they would like to pursue a video fluoroscopic swallow study. They were in agreement to writer placing orders in system for if they decide they would like further assessment, they would not need to coordinate with multiple providers first.      Progress towards therapy goal(s). See goals on Care Plan in [facility] electronic health record for goal details.  Goals not met.  Barriers to achieving goals:   discharge on same date as initial evaluation.    Therapy recommendation(s):    Continued therapy is recommended.  Rationale/Recommendations:  Pt with mod-severe dysphagia, recommend OP VFSS for further assessment with OP SLP follow up for intervention, pending overall goals/plan of care with oncologist. Safest PO during today's assessment was puree, thin liquids via very small bites/sips, OK to thicken to mildly thick if needed. Provided handouts re: puree solids, where to buy thickened, how to thicken liquids. RN also provided pill  to assist in medication intake safely.

## 2021-10-27 NOTE — ED NOTES
[facility]  ED Nurse Handoff Report    Lorna Guzman is a 66 year old female   ED Chief complaint: Shortness of Breath  . ED Diagnosis:   Final diagnoses:   Shortness of breath   Hyponatremia   Dysphagia, unspecified type   Metastatic breast cancer (H)     Allergies:   Allergies   Allergen Reactions     Lisinopril Hives and Swelling     Venlafaxine Hives and Swelling     Possibly allergy       Code Status: DNR / DNI  Activity level - Baseline/Home:  Assist X 1. Activity Level - Current:   Assist X 1. Lift room needed: No. Bariatric: No   Needed: No   Isolation: No. Infection: Not Applicable.     Vital Signs:   Vitals:    10/27/21 0145 10/27/21 0200 10/27/21 0215 10/27/21 0230   BP:       Pulse: 86 98 102 96   Resp: 25 28 14 17   Temp:       TempSrc:       SpO2: 96% 95% 96% 95%   Weight:       Height:           Cardiac Rhythm:  ,      Pain level:    Patient confused: No. Patient Falls Risk: Yes.   Elimination Status: Has voided   Patient Report - Initial Complaint: hard time swallowing. Focused Assessment: cancer has mets to brain had 1st radiation on Tuesday.    Tests Performed: labs,xray. Abnormal Results:   Labs Ordered and Resulted from Time of ED Arrival Up to the Time of Departure from the ED   BASIC METABOLIC PANEL - Abnormal; Notable for the following components:       Result Value    Sodium 124 (*)     Chloride 93 (*)     Urea Nitrogen 32 (*)     Glucose 125 (*)     All other components within normal limits   CBC WITH PLATELETS AND DIFFERENTIAL - Abnormal; Notable for the following components:    Absolute Neutrophils 8.5 (*)     Absolute Immature Granulocytes 0.2 (*)     All other components within normal limits   TROPONIN I - Normal   INFLUENZA A/B & SARS-COV2 PCR MULTIPLEX - Normal    Narrative:     Testing was performed using the jamaal SARS-CoV-2 & Influenza A/B Assay on the jamaal Gissell System. This test should be ordered for the detection of SARS-CoV-2 and influenza viruses  in individuals who meet clinical and/or epidemiological criteria. Test performance is unknown in asymptomatic patients. This test is for in vitro diagnostic use under the FDA EUA for laboratories certified under CLIA to perform moderate and/or high complexity testing. This test has not been FDA cleared or approved. A negative result does not rule out the presence of PCR inhibitors in the specimen or target RNA in concentration below the limit of detection for the assay. If only one viral target is positive but coinfection with multiple targets is suspected, the sample should be re-tested with another FDA cleared, approved or authorized test, if coinfection would change clinical management. [facility] are certified under the Clinical Laboratory Improvement Amendments of 1988 (CLIA-88) as  qualified to perform moderate and/or high complexity laboratory testing.   EXTRA BLUE TOP TUBE   EXTRA GREEN TOP (LITHIUM HEPARIN) TUBE   EXTRA PURPLE TOP TUBE   EXTRA RED TOP TUBE   PERIPHERAL IV CATHETER   PULSE OXIMETRY NURSING   CARDIAC CONTINUOUS MONITORING   PATIENT CARE ORDER   CBC WITH PLATELETS & DIFFERENTIAL    Narrative:     The following orders were created for panel order CBC with platelets differential.  Procedure                               Abnormality         Status                     ---------                               -----------         ------                     CBC with platelets and d...[[id number]]  Abnormal            Final result                 Please view results for these tests on the individual orders.   EXTRA TUBE    Narrative:     The following orders were created for panel order Extra Tube ([place]eld Draw).  Procedure                               Abnormality         Status                     ---------                               -----------         ------                     Extra Blue Top Tube[[id number]]                              Final result               Extra Red Top  Tube[[id number]]                                                          Extra Green Top (Lithium...[[id number]]                      Final result               Extra Purple Top Tube[[id number]]                            Final result                 Please view results for these tests on the individual orders.   Treatments provided: fluids medsFamily Comments: hereOBS brochure/video discussed/provided to patient:  Yes  ED Medications:   Medications   sodium chloride 0.9% infusion (has no administration in time range)   ipratropium - albuterol 0.5 mg/2.5 mg/3 mL (DUONEB) neb solution 3 mL (3 mLs Nebulization Given 10/27/21 0158)   0.9% sodium chloride BOLUS (500 mLs Intravenous New Bag 10/27/21 0157)   ibuprofen (ADVIL/MOTRIN) suspension 400 mg (400 mg Oral Given 10/27/21 0153)     Drips infusing:  No  For the majority of the shift, the patient's behavior Green. Interventions performed were none.    Sepsis treatment initiated: No     Patient tested for COVID 19 prior to admission: YES    ED Nurse Name/Phone Number: Teresa Katz RN,   3:14 AM    RECEIVING UNIT ED HANDOFF REVIEW    Above ED Nurse Handoff Report was reviewed: Yes  Reviewed by: Shirley Solano RN on October 27, 2021 at 1:18 PM

## 2021-10-27 NOTE — PLAN OF CARE
"SLP: Clinical swallow evaluation completed, full note to follow. Moderate-severe oral and suspected pharyngeal dysphagia: mod-severe R sided facial and suspected pharyngeal/laryngeal weakness, suspected cranial nerve impairment related to brain metastasis with radiation. Addendum: MRI results in following assessment, revealing slightly increased cerebellar metastases as well as a few new small cortical areas of restricted diffusion within both cerebral hemispheres concerning for small cortical infarcts and regions of infarct within the perirolandic regions bilaterally.     Recommendations: cautious initiation of puree solids, thin liquids via very small tsp or cup sips, alternate between solids/liquids consistently, allow time to swallow x3-4 times per bolus; thicken to mildly thick if needed. Concern for nutrition/hydration abilities given modifications/ extremely small boluses/ effort to swallow.     Pt will require video fluoroscopic swallow study for further assessment given severity of deficits. Per RN, TBD if pt will be admitted vs discharge for radiation therapy scheduled for today (her specific radiation cannot be completed at our facility). VFSS can be completed as IP vs OP. [employer] message and page sent to hospitalist to discuss.     Phone conversation held with Dr. Mcgowan who is in contact with Dr. Lua re: POC.     Please vocera \"speech therapy\" or call [phone] if needed.   "

## 2021-10-27 NOTE — PHARMACY-ADMISSION MEDICATION HISTORY
Admission medication history interview status for this patient is complete. See [facility] admission navigator for allergy information, prior to admission medications and immunization status.     Medication history interview done, indicate source(s): Patient and Family  Medication history resources (including written lists, pill bottles, clinic record):[employer]  Pharmacy: [facility]    Changes made to PTA medication list:  Added: Eliquis, Famotidine, Gabapentin,   Changed:   Reported as Not Taking:   Removed: protonix    Actions taken by pharmacist (provider contacted, etc):sticky note for md     Additional medication history information:Patient is transitioning from Xarelto to Eliquis- plan was to change today.  Patient has also switched from PPI's to famotidine (clarified with [facility]) Both changes were made due to interactions with new chemo agents.        Medication reconciliation/reorder completed by provider prior to medication history?  N   (Y/N)     For patients on insulin therapy: NA        Prior to Admission medications    Medication Sig Last Dose Taking? Auth Provider   ADO-trastuzumab Inject into the vein every 21 days  Past Month at Unknown time Yes Reported, Patient   apixaban ANTICOAGULANT (ELIQUIS) 2.5 MG tablet Take 2.5 mg by mouth 2 times daily not started yet Yes Unknown, Entered By History   carvedilol (COREG) 12.5 MG tablet Take 1 tablet (12.5 mg) by mouth 2 times daily (with meals) Increased dose. Please fill. 10/26/2021 at Unknown time Yes Melisa Billingsley PA-C   dexamethasone (DECADRON) 4 MG tablet Take 4 mg by mouth 3 times daily 10/26/2021 Yes Unknown, Entered By History   famotidine (PEPCID) 20 MG tablet Take 20 mg by mouth daily 10/26/2021 at Unknown time Yes Unknown, Entered By History   gabapentin (NEURONTIN) 100 MG capsule Take 100 mg by mouth 3 times daily 10/26/2021 at Unknown time Yes Unknown, Entered By History   rivaroxaban ANTICOAGULANT (XARELTO ANTICOAGULANT) 20 MG TABS  tablet Take 20 mg by mouth daily  10/26/2021 Yes Melisa Billingsley PA-C   sulfamethoxazole-trimethoprim (BACTRIM DS) 800-160 MG tablet Take 1 tablet by mouth three times a week Mon/Wed/Fri 10/26/2021 Yes Unknown, Entered By History   capecitabine (XELODA) 500 MG tablet Take 1,500 mg by mouth 2 times daily On days 1-14 of each 21 day cycle not started yet  Unknown, Entered By History   tucatinib (TUKYSA) 150 MG tablet Take 300 mg by mouth 2 times daily not started yet at Unknown time  Unknown, Entered By History

## 2021-10-27 NOTE — ED NOTES
Had 1st radiation on Tuesday. Coughing so hard I cant swallow. Dr. Simon called and wanted an mri today . They feel my symptoms are progressing  To fast. Here for cough , lots of phlem hard time swallowing I start gaging did get all my meds down but worse today and on Monday

## 2021-10-27 NOTE — PROGRESS NOTES
SPIRITUAL HEALTH SERVICES Progress Note   ED    Saw pt Lorna Guzman per an admission request.  Her spouse Leonico was present. Offered reflective conversation to facilitate the processing of thoughts and feelings.      Illness Narrative - Agnes reported that she has a history of breast cancer and came to the hospital with SOB.  She is waiting to hear if she will be admitted or discharged to go to her outpatient radiation appointment later today.      Distress -   fátima Alarcon shared that she is discerning whether to continue with restorative care or to transition to Hospice.  She plans to have a conversation about the direction of her care with her oncologist and Leoncio.  o Agnes expressed concern for Leoncio that he is not attending enough to his self-care.  Leoncio acknowledge his sense of helplessness.  He reported that he relies on their family's support.      Coping -   fátima Alarcon name Leoncio, their three children, a [place] of friends and their L[employer] day as being part of her support network.  o She is affiliated with [facility].  Her L[employer] day  anointed her at home yesterday.  Agnes welcomed prayer and engaged in a few moments of guided contemplative breathing before praying.      Meaning-Making -   fátima Alarcon and Leoncio shared aspects of their children's lives.  o They enjoy traveling and reminisced briefly about their favorite places to vacation.    fátima Alarcon reflected on her love for her family and God    Provided emotional support through reflective listening, validation of feelings, and affirmation of their support system.      Plan - Informed pt and her spouse how they can request further  support.  This author and other chaplains remain available per pt/family request.    Cruz Padilla M.Div., [facility]  Staff   Phone [phone]

## 2021-10-27 NOTE — ED NOTES
NEW PIV STARTED IN LEFT HAND AFTER INFILTRATION OF LEFT AC PIV  PT GIVEN DILAUDID 0.2mg FOR PAIN IN RIGHT ARM, NS RESTARTED @100cc/hr  PT UP TO COMMODE WITH STAND BY ASSIST OF 1  BROUGHT PT IN CHAP STICK, PER PT REQUEST  BROUGHT IN PT MOUTH SWABS, TOOTH BRUSH, TOOTH PASTE, COMB, NON SKID SOCKS, DEODORANT AND SOAP.   AT BEDSIDE  Bed low and locked, call light in reach.  /85  P-99  R-20  O2-95%RA

## 2021-10-27 NOTE — SIGNIFICANT EVENT
SPIRITUAL HEALTH SERVICES Significant Event  [employer] Sac[employer] of ANOINTING    ED    Pt was anointed 10/26/2021 at home by her [place]  from [facility].    Cruz Padilla M.Div., [facility]  Staff   Phone [phone]

## 2021-10-27 NOTE — ED TRIAGE NOTES
Here for concern of sob, coughing, and difficulty swallowing. Stated taht when she swallow, her pills get stuck in her throat or esophagus. History breast cancer that metastasize to the brain causing right eye droop and right arm weakness. Is on Xeralto, but with plan to switch to Eliquis tomorrow. Had first radiation around 2pm. Took tylenol around 9:30pm. ABCs intact.

## 2021-10-27 NOTE — DISCHARGE SUMMARY
[facility]  Discharge Summary  Name: Lorna Guzman    MRN: [id number]  YOB: 1955    Age: 66 year old  Date of Discharge:  10/27/2021  Date of Admission: 10/27/2021  Primary Care Provider: [facility]  Discharge Physician:  Niall Mcgowan MD  Discharging Service:  Hospitalist      Discharge Diagnosis:  Metastatic brain cancer with multiple intracranial metastatic lesions  Hypovolemic hyponatremia-improving     I will refer you to the H&P of Dr. Sanchez for other details of her hospital stay as this is an admit discharge on the same day.  She only stayed in the ER for several hours.      Other Diagnosis:  Past Medical History:   Diagnosis Date     Cancer (H)     breast     Cardiomyopathy (H)      Mitral regurgitation 3/19/2020     MVP (mitral valve prolapse) 3/19/2020     Thrombosis 02/2021    DVT          Discharge Disposition:  Discharged to home     Allergies:  Allergies   Allergen Reactions     Lisinopril Hives and Swelling     Venlafaxine Hives and Swelling     Possibly allergy        Discharge Medications:   Current Discharge Medication List      CONTINUE these medications which have NOT CHANGED    Details   ADO-trastuzumab Inject into the vein every 21 days       carvedilol (COREG) 12.5 MG tablet Take 1 tablet (12.5 mg) by mouth 2 times daily (with meals) Increased dose. Please fill.  Qty: 180 tablet, Refills: 3    Associated Diagnoses: Secondary cardiomyopathy (H)      dexamethasone (DECADRON) 4 MG tablet Take 4 mg by mouth 3 times daily      famotidine (PEPCID) 20 MG tablet Take 20 mg by mouth daily      gabapentin (NEURONTIN) 100 MG capsule Take 100 mg by mouth 3 times daily      rivaroxaban ANTICOAGULANT (XARELTO ANTICOAGULANT) 20 MG TABS tablet Take 20 mg by mouth daily   Qty:        sulfamethoxazole-trimethoprim (BACTRIM DS) 800-160 MG tablet Take 1 tablet by mouth three times a week Mon/Wed/Fri      capecitabine (XELODA) 500 MG tablet Take 1,500 mg by mouth  "2 times daily On days 1-14 of each 21 day cycle      tucatinib (TUKYSA) 150 MG tablet Take 300 mg by mouth 2 times daily         STOP taking these medications       apixaban ANTICOAGULANT (ELIQUIS) 2.5 MG tablet Comments:   Reason for Stopping:                Condition on Discharge:  Discharge condition: Stable   Discharge vitals: Blood pressure 130/85, pulse 94, temperature 97.6  F (36.4  C), temperature source Oral, resp. rate 16, height 1.753 m (5' 9\"), weight 53.1 kg (117 lb), SpO2 94 %, not currently breastfeeding.   Code status on discharge: DNR     History of Present Illness:  See detailed admission note for full details.        Significant Physical Exam Findings Day of Discharge:  Awake, alert, following simple verbal instructions.  Will refer you to exam of admitting physician on same day    Procedures other than Imaging:  none     Imaging:  Results for orders placed or performed during the hospital encounter of 10/27/21   XR Chest Port 1 View    Narrative    EXAM: XR CHEST PORT 1 VIEW  LOCATION: [facility]  DATE/TIME: 10/27/2021 2:27 AM    INDICATION: cough, shortness of breath  COMPARISON: CT chest dated 10/21/2021      Impression    IMPRESSION: Heart size is likely stable. Thoracic aorta is tortuous. Elevation of the left hemidiaphragm, with left perihilar opacity and left-sided volume loss likely unchanged. Probable trace left effusion. Right lung is clear of acute infiltrates.   Peripheral right upper lobe chronic scarring redemonstrated. Thoracolumbar scoliosis again noted.   CT Chest Pulmonary Embolism w Contrast    Narrative    EXAM: CT CHEST PULMONARY EMBOLISM WITH CONTRAST  LOCATION: [facility]  DATE/TIME: 10/27/2021, 3:34 AM    INDICATION: Shortness of breath in a patient with history of metastatic breast cancer. Evaluate for pulmonary embolism.  COMPARISON: 10/21/2021, 02/06/2021.  TECHNIQUE: CT chest pulmonary angiogram during arterial phase " injection of IV contrast. Multiplanar reformats and MIP reconstructions were performed. Dose reduction techniques were used.   CONTRAST: 51 mL Isovue-370.    FINDINGS:  ANGIOGRAM CHEST: No evidence for pulmonary embolism. Pulmonary arteries to the left upper lung markedly attenuated due to masslike consolidation in the left hilar region. Normal caliber thoracic aorta. Phase of contrast opacification does not allow for   adequate evaluation of dissection. No CT evidence of right heart strain.    LUNGS AND PLEURA: Unchanged reticulonodular infiltrates involving the left upper lobe with consolidation in the left suprahilar region with masslike opacity, unchanged. Complete collapse left lower lobe, unchanged. Stable subpleural consolidation right   upper lobe.    MEDIASTINUM/AXILLAE: Unchanged mediastinal lymphadenopathy. No significant change in the soft tissue density in the left hilar region correlating to the aforementioned mass. No pericardial fluid.    CORONARY ARTERY CALCIFICATION: None.    UPPER ABDOMEN: Stable hypodensities right hepatic lobe. Soft tissue density and nodularity left upper retroperitoneum, unchanged.    MUSCULOSKELETAL: Minimal interval advancement of the oblique compression to the anterior aspect of the T8 vertebral body with sclerotic densities remaining stable. Marked degenerative changes lumbar spine.      Impression    IMPRESSION:  1.  Minimal interval advancement of oblique compression to the anterior aspect of the T8 vertebral body with sclerotic densities remaining stable.    2.  No evidence for pulmonary embolism.    3.  Unchanged reticulonodular infiltrates left upper lobe with consolidation and left suprahilar mass remaining stable. Complete collapse left lower lobe, unchanged.    4.  Unchanged mediastinal lymphadenopathy.     MR Brain w/o & w Contrast    Narrative    MRI BRAIN WITHOUT AND WITH CONTRAST  10/27/2021 11:25 AM     HISTORY: Breast cancer with brain  metastases.    TECHNIQUE:  Multiplanar, multisequence MRI of the brain without and  with 5 mL Gadavist.     COMPARISON: Outside brain MR 10/14/2021.     FINDINGS: Small foci of cortical restricted diffusion in the right  perirolandic region, right parietal lobe, and left precentral gyrus  which could represent small acute cortical infarcts. No evidence of  hemorrhagic transformation. No significant midline shift or  herniation.    Abnormal enhancing lesions in the cerebellar hemispheres are again  seen. These appear to be increased in size since most recent brain MR  10/14/2021. For reference, the largest lesion in the right cerebellum  measures 2.9 cm, previously 2.5 cm. Lesion in the left cerebellum  measures 1.1 cm, previously 0.8 cm, and a lesion more superiorly in  the left cerebellum measures 0.7 cm, previously 0.5 cm. Surrounding T2  hyperintense edema around the cerebellar lesions is fairly similar to  prior. No significant mass effect or compression along the fourth  ventricle. Ventricular size is within normal limits without evidence  of hydrocephalus.    No definite new intracranial enhancing lesions. No definite abnormal  leptomeningeal enhancement appreciated.     The facial structures appear normal. The major arterial T2 flow voids  at the base of the brain appear patent.       Impression    IMPRESSION:    1. A few new small cortical areas of restricted diffusion within both  cerebral hemispheres concerning for small cortical infarcts. There are  regions of infarct within the perirolandic regions bilaterally. No  evidence of hemorrhagic transformation of infarct. No midline shift or  herniation.  2. Enhancing cerebellar metastases appear to be slightly increased in  size since previous MR 10/14/2021. Surrounding T2 hyperintense edema  is not significantly changed. No significant mass effect or  compression of the fourth ventricle.  3. No definite evidence of leptomeningeal metastases by brain  MRI  although this is not excluded by imaging.    ADELA MICHAEL MD         SYSTEM ID:  [id number]        Consultations:  Pending oncology evaluation but patient will be seen in the oncology clinic this afternoon.     Recent Lab Results:  Recent Labs   Lab 10/27/21  0058 10/21/21  0956   WBC 10.5 10.1   HGB 14.5 13.7   HCT 44.0 42.2   MCV 85 85    254     No results for input(s): CULT in the last 168 hours.  Recent Labs   Lab 10/27/21  1235 10/27/21  0058 10/22/21  1415 10/22/21  0602 10/22/21  0141 10/21/21  1631 10/21/21  0956   * 124*  --  131*  --   --  132*   POTASSIUM 4.6 4.9  --  4.3  --   --  3.9   CHLORIDE 98 93*  --  99  --   --  97   CO2 20 22  --  23  --   --  25   ANIONGAP 9 9  --  9  --   --  10   GLC 98 125* 144* 107*   < >   < > 110*   BUN 24 32*  --  22  --   --  19   CR 0.60 0.75  --  0.73  --   --  0.77   GFRESTIMATED >90 85  --  87  --   --  82   CAROLYN 8.1* 8.7  --  9.0  --   --  8.7   PROTTOTAL  --   --   --   --   --   --  7.6   ALBUMIN  --   --   --   --   --   --  3.4   BILITOTAL  --   --   --   --   --   --  0.4   ALKPHOS  --   --   --   --   --   --  56   AST  --   --   --   --   --   --  247*   ALT  --   --   --   --   --   --  23    < > = values in this interval not displayed.     Recent Labs   Lab 10/27/21  1235 10/27/21  0058 10/22/21  1415 10/22/21  0602 10/22/21  0141   GLC 98 125* 144* 107* 125*     No results for input(s): LACT in the last 168 hours.  Recent Labs   Lab 10/27/21  0058 10/21/21  2340 10/21/21  1757   TROPONIN <0.015 0.086* 0.110*     No results for input(s): COLOR, APPEARANCE, URINEGLC, URINEBILI, URINEKETONE, SG, UBLD, URINEPH, PROTEIN, UROBILINOGEN, NITRITE, LEUKEST, RBCU, WBCU in the last 168 hours.       Pending Results:    Unresulted Labs Ordered in the Past 30 Days of this Admission     No orders found from 9/27/2021 to 10/28/2021.           Discharge Instructions and Follow-Up:   Discharge diet: Orders Placed This Encounter      Combination Diet Pureed  Diet (level 4); Thin Liquids (level 0)      Diet     Discharge activity: Activity as tolerated   Discharge follow-up:  We will follow up with oncology service this afternoon   Outpatient therapy: None    Other instructions: None      Hospital Course:  H&P reviewed.  Seen and examined.  Underwent MRI.  I was in communication with primary oncology service of Ms. Alcantar.  She will be seen by Dr. Lua later this afternoon as per patient's account.  Oncology service requesting for hospital service discharge patient as further plans and management will be pursued as outpatient  She was ruled out for leptomeningeal involvement, inflammation as of now based on radiographic findings on MRI.  But patient has a rapid deterioration in a matter of short period of time and this cannot be totally ruled out.  I spoke with the patient and her  while boarding in the emergency room and they are agreeable for the plan hospital discharge and will be proceeding with their primary oncology's appointment this afternoon.  No new medications prescribed from my service upon discharge.  She has 2 listed DOAC with Xarelto and Eliquis.  Patient and her  mentioned that today is her last day for Xarelto and will be continuing with Eliquis starting tomorrow.  No modifications of these establish anticoagulation plan for her.     Total time spent in face to face contact with the patient and coordinating discharge was:  > 30 Minutes.

## 2021-12-03 NOTE — PROGRESS NOTES
Cardiology Clinic Progress Note  Lorna Guzman MRN# [id number]   YOB: 1955 Age: 61 year old     Reason For Visit: Follow-up cardiomyopathy   Primary Cardiologist:   Dr. Spencer          History of Presenting Illness:    Lorna Guzman is a pleasant 61 year old patient with no past cardiac history. Past medical history significant for recurrent right breast carcinoma.  She has a history of right breast carcinoma diagnosed in 2006 and underwent lumpectomy and AC chemotherapy as well as adjuvant radiation therapy.  She was also placed on adjuvant tamoxifen.  After two years of tamoxifen she transitioned to Aromasin and completed treatment in 2013 with no evidence of recurrent disease.  Unfortunately, in late 2016 she noticed a right breast lump and was diagnosed with invasive ductal carcinoma of the right breast.  Current plan is for neoadjuvant chemotherapy with taxane, cyclophosphamide, as well as Herceptin, prior to definitive mastectomy and axillary lymph node dissection.  As part of her chemotherapy workup she underwent echocardiogram which noted a reduced LV systolic function of 47% and she was referred to cardiology.     Pt saw Dr. Spencer on 2/6/2017 and he noted the most likely culprit for her cardiomyopathy was her prior history of Adriamycin therapy.  He initiated carvedilol and lisinopril. Note from 2/13/2017 shows that Dr. Spencer discussed cardiac MRI results with Dr. Lindo noting that with her malignancy being HER-2 positive it was reasonable to start Herceptin and recommended follow-up cardiac MRI to reassess her LVEF. Patient was seen by me on 2/20/2017. Her stress cardiac MRI from 2/10/2017 showed LVEF 43% with mild to moderate diffuse hypokinesis, RVEF 63%, mild biatrial enlargement, myxomatous mitral valve with bileaflet prolapse and mild MR, TR prolapse with mild TR, no evidence of myocardial infarction fibrosis or infiltrative disease, and no ischemia with stress.  Her baseline troponin  was less than 0.015 and N-terminal proBNP was 136.  Her carvedilol was increased and a low-sodium diet with daily weights was discussed.     Patient was last seen by me on 3/7/2017 and her carvedilol was increased to 12.5 mg b.i.d.  Overall, she was doing well from a cardiac standpoint.     Pt presents today, with her daughter Federica, for follow-up cardiomyopathy. Unfortunately, she had a liver biopsy on 2/7/2017 which revealed metastatic carcinoma from her breast cancer. She has been taking increased carvedilol 12.5 mg b.i.d. without any side effects.  Her blood pressure today in the clinic is 122/70 and heart rate is 72.  She denies any lightheadedness or dizziness.  She has not noted any increase in her weight. Overall, she has been doing well from a cardiac standpoint.  Her next Herceptin treatment will be on Thursday, 3/23/2017.  Patient reports no chest pain, shortness of breath, PND, orthopnea, presyncope, syncope, edema, heart racing, or palpitations.      Current Cardiac Medications   Carvedilol 12.5 mg b.i.d.  Lisinopril 2.5 mg daily                   Assessment and Plan:     Plan  1.  Increase carvedilol to 25 mg b.i.d.  2.  Call the clinic with any lightheadedness, dizziness, or if SBP is <90  3.  Check daily weights and call the clinic if your weight has increased more than 2 lbs in one day or 5 lbs in one week.  4.  2 Gm Na diet   5.  Cardiac MRI without contrast in 1 month, to reassess EF on Herceptin  6.  Follow-up with [facility] in 2 weeks to uptitrate CM medication   7.  Follow-up Dr. Spencer in May        1. Nonischemic Cardiomyopathy    Due to history of Adriamycin therapy    LVEF 45% on cardiac MRI 3/20/17 only improved 2%    No signs of heart failure    Increase beta blocker and continue lisinopril          Thank you for allowing me to participate in this delightful patient's care.      This note was completed in part using Dragon voice recognition software. Although reviewed after completion, some word  and grammatical errors may occur.    Maira Khanna, APRN, CNP           Data:   All laboratory data reviewed   no

## 2022-05-25 NOTE — TELEPHONE ENCOUNTER
Orders placed for patient to have an [facility] follow up in 2-3 weeks. Contacted patient to review Dr. Spencer's recommendations. Instructed patient to keep a log of her BP at home to bring to OV. Also instructed patient to call if her BP is consistently running greater than 140s systolic. Patient is agreeable to plan. Medication list updated. Patient connected with scheduling to set up [facility] visit after already scheduled echocardiogram on 10/9/18.    Lorenzo, Vishnu Saldivar, Apolonia ANDERSEN

## 2022-06-05 ENCOUNTER — HEALTH MAINTENANCE LETTER (OUTPATIENT)
Age: 67
End: 2022-06-05

## 2022-07-31 ENCOUNTER — HEALTH MAINTENANCE LETTER (OUTPATIENT)
Age: 67
End: 2022-07-31

## 2022-10-15 ENCOUNTER — HEALTH MAINTENANCE LETTER (OUTPATIENT)
Age: 67
End: 2022-10-15

## 2023-04-03 PROBLEM — C50.919 PRIMARY MALIGNANT NEOPLASM OF BREAST WITH METASTASIS (H): Status: ACTIVE | Noted: 2021-10-27

## 2023-08-20 ENCOUNTER — HEALTH MAINTENANCE LETTER (OUTPATIENT)
Age: 68
End: 2023-08-20

## 2023-10-29 ENCOUNTER — HEALTH MAINTENANCE LETTER (OUTPATIENT)
Age: 68
End: 2023-10-29

## 2024-06-17 PROBLEM — Z76.89 HEALTH CARE HOME: Status: RESOLVED | Noted: 2021-01-22 | Resolved: 2024-06-17

## 2024-10-13 ENCOUNTER — HEALTH MAINTENANCE LETTER (OUTPATIENT)
Age: 69
End: 2024-10-13

## (undated) DEVICE — PACK MINOR CUSTOM RIDGES SBA32RMRMA

## (undated) DEVICE — ESU GROUND PAD ADULT W/CORD E7507

## (undated) DEVICE — ESU HOLSTER PLASTIC DISP E2400

## (undated) DEVICE — DECANTER VIAL 2006S

## (undated) DEVICE — NDL BLUNT 18GA 1" W/O FILTER 305181

## (undated) DEVICE — DRAIN JACKSON PRATT 15FR ROUND SIL LF JP-2229

## (undated) DEVICE — SU MONOCRYL 4-0 PS-2 18" UND Y496G

## (undated) DEVICE — DRAIN JACKSON PRATT RESERVOIR 100ML SU130-1305

## (undated) DEVICE — SU SILK 3-0 SH 30" K832H

## (undated) DEVICE — PREFILTER SMOKE EVAC E6330

## (undated) DEVICE — DRSG BANDAID 1X3" FABRIC

## (undated) DEVICE — DRSG STERI STRIP 1/2X4" R1547

## (undated) DEVICE — SU VICRYL 3-0 SH 27" J316H

## (undated) DEVICE — PREP SKIN SCRUB TRAY 4461A

## (undated) DEVICE — GLOVE PROTEXIS W/NEU-THERA 7.5  2D73TE75

## (undated) DEVICE — LINEN FULL SHEET 5511

## (undated) DEVICE — TUBING SUCTION 12"X1/4" N612

## (undated) DEVICE — TUBING SMOKE EVAC ATTACHMENT E3590

## (undated) DEVICE — SOL ADH LIQUID BENZOIN SWAB 0.6ML C1544

## (undated) DEVICE — LINEN HALF SHEET 5512

## (undated) DEVICE — ESU ELEC BLADE 2.75" COATED/INSULATED E1455

## (undated) DEVICE — BAG CLEAR TRASH 1.3M 39X33" P4040C

## (undated) DEVICE — GLOVE PROTEXIS BLUE W/NEU-THERA 8.0  2D73EB80

## (undated) DEVICE — LINEN DRAPE 54X72" 5467

## (undated) DEVICE — DRSG GAUZE 4X4" TRAY

## (undated) DEVICE — LINEN TOWEL PACK X10 5473

## (undated) DEVICE — DRAPE BREAST/CHEST 29420

## (undated) DEVICE — SOL NACL 0.9% IRRIG 1000ML BOTTLE 2F7124

## (undated) DEVICE — PREP SCRUB SOL EXIDINE 4% CHG 4OZ 29002-404

## (undated) DEVICE — SUCTION MANIFOLD NEPTUNE 2 SYS 4 PORT 0702-020-000

## (undated) DEVICE — SLEEVE PROTECTIVE BREAST BIOPSY  GMSLV001-10

## (undated) DEVICE — TUBING SMOKE EVAC 3/8"X10' E3645

## (undated) DEVICE — SYR EAR BULB 3OZ 0035830

## (undated) DEVICE — PAD CHUX UNDERPAD 30X36" P3036C

## (undated) DEVICE — SU VICRYL 3-0 TIE 12X18" J904T

## (undated) DEVICE — SU VICRYL 2-0 TIE 12X18" J905T

## (undated) DEVICE — SU VICRYL 4-0 PS-2 18" UND J496H

## (undated) DEVICE — SU SILK 3-0 PS-1 18" 1684G

## (undated) DEVICE — SUCTION TIP YANKAUER W/O VENT K86

## (undated) DEVICE — GLOVE PROTEXIS BLUE W/NEU-THERA 7.5  2D73EB75

## (undated) DEVICE — PACK MAJOR SBA15MAFSI

## (undated) DEVICE — PUNCH SKIN 4MM P425

## (undated) DEVICE — GLOVE PROTEXIS W/NEU-THERA 7.0  2D73TE70

## (undated) DEVICE — GOWN LG DISP 9515

## (undated) RX ORDER — REGADENOSON 0.08 MG/ML
INJECTION, SOLUTION INTRAVENOUS
Status: DISPENSED
Start: 2017-02-10

## (undated) RX ORDER — LIDOCAINE HYDROCHLORIDE 10 MG/ML
INJECTION, SOLUTION EPIDURAL; INFILTRATION; INTRACAUDAL; PERINEURAL
Status: DISPENSED
Start: 2019-10-24

## (undated) RX ORDER — NALOXONE HYDROCHLORIDE 0.4 MG/ML
INJECTION, SOLUTION INTRAMUSCULAR; INTRAVENOUS; SUBCUTANEOUS
Status: DISPENSED
Start: 2017-02-07

## (undated) RX ORDER — CEFAZOLIN SODIUM 2 G/100ML
INJECTION, SOLUTION INTRAVENOUS
Status: DISPENSED
Start: 2019-10-24

## (undated) RX ORDER — FENTANYL CITRATE 50 UG/ML
INJECTION, SOLUTION INTRAMUSCULAR; INTRAVENOUS
Status: DISPENSED
Start: 2017-02-07

## (undated) RX ORDER — BUPIVACAINE HYDROCHLORIDE AND EPINEPHRINE 2.5; 5 MG/ML; UG/ML
INJECTION, SOLUTION EPIDURAL; INFILTRATION; INTRACAUDAL; PERINEURAL
Status: DISPENSED
Start: 2021-10-05

## (undated) RX ORDER — GLYCOPYRROLATE 0.2 MG/ML
INJECTION INTRAMUSCULAR; INTRAVENOUS
Status: DISPENSED
Start: 2019-10-24

## (undated) RX ORDER — PROPOFOL 10 MG/ML
INJECTION, EMULSION INTRAVENOUS
Status: DISPENSED
Start: 2019-10-24

## (undated) RX ORDER — PHENYLEPHRINE HCL IN 0.9% NACL 1 MG/10 ML
SYRINGE (ML) INTRAVENOUS
Status: DISPENSED
Start: 2019-10-24

## (undated) RX ORDER — LIDOCAINE HYDROCHLORIDE AND EPINEPHRINE 5; 5 MG/ML; UG/ML
INJECTION, SOLUTION INFILTRATION; PERINEURAL
Status: DISPENSED
Start: 2021-10-05

## (undated) RX ORDER — FENTANYL CITRATE 50 UG/ML
INJECTION, SOLUTION INTRAMUSCULAR; INTRAVENOUS
Status: DISPENSED
Start: 2019-10-24

## (undated) RX ORDER — ONDANSETRON 2 MG/ML
INJECTION INTRAMUSCULAR; INTRAVENOUS
Status: DISPENSED
Start: 2019-10-24

## (undated) RX ORDER — BUPIVACAINE HYDROCHLORIDE 2.5 MG/ML
INJECTION, SOLUTION EPIDURAL; INFILTRATION; INTRACAUDAL
Status: DISPENSED
Start: 2019-10-24

## (undated) RX ORDER — FLUMAZENIL 0.1 MG/ML
INJECTION, SOLUTION INTRAVENOUS
Status: DISPENSED
Start: 2017-02-07

## (undated) RX ORDER — AMINOPHYLLINE 25 MG/ML
INJECTION, SOLUTION INTRAVENOUS
Status: DISPENSED
Start: 2017-02-10

## (undated) RX ORDER — DEXAMETHASONE SODIUM PHOSPHATE 4 MG/ML
INJECTION, SOLUTION INTRA-ARTICULAR; INTRALESIONAL; INTRAMUSCULAR; INTRAVENOUS; SOFT TISSUE
Status: DISPENSED
Start: 2019-10-24